# Patient Record
Sex: MALE | Race: WHITE | NOT HISPANIC OR LATINO
[De-identification: names, ages, dates, MRNs, and addresses within clinical notes are randomized per-mention and may not be internally consistent; named-entity substitution may affect disease eponyms.]

---

## 2017-08-10 ENCOUNTER — NON-APPOINTMENT (OUTPATIENT)
Age: 72
End: 2017-08-10

## 2017-08-10 ENCOUNTER — APPOINTMENT (OUTPATIENT)
Dept: CARDIOLOGY | Facility: CLINIC | Age: 72
End: 2017-08-10
Payer: MEDICARE

## 2017-08-10 VITALS
DIASTOLIC BLOOD PRESSURE: 84 MMHG | WEIGHT: 171 LBS | BODY MASS INDEX: 25.91 KG/M2 | OXYGEN SATURATION: 95 % | HEIGHT: 68 IN | HEART RATE: 62 BPM | SYSTOLIC BLOOD PRESSURE: 132 MMHG

## 2017-08-10 PROCEDURE — 93000 ELECTROCARDIOGRAM COMPLETE: CPT

## 2017-08-10 PROCEDURE — 99214 OFFICE O/P EST MOD 30 MIN: CPT

## 2017-09-15 ENCOUNTER — OUTPATIENT (OUTPATIENT)
Dept: OUTPATIENT SERVICES | Facility: HOSPITAL | Age: 72
LOS: 1 days | End: 2017-09-15
Payer: COMMERCIAL

## 2017-09-15 DIAGNOSIS — R06.02 SHORTNESS OF BREATH: ICD-10-CM

## 2017-09-15 DIAGNOSIS — Z98.89 OTHER SPECIFIED POSTPROCEDURAL STATES: Chronic | ICD-10-CM

## 2017-09-15 DIAGNOSIS — I10 ESSENTIAL (PRIMARY) HYPERTENSION: ICD-10-CM

## 2017-09-15 PROCEDURE — 93306 TTE W/DOPPLER COMPLETE: CPT | Mod: 26

## 2017-09-15 PROCEDURE — 78452 HT MUSCLE IMAGE SPECT MULT: CPT | Mod: 26

## 2017-09-15 PROCEDURE — 93017 CV STRESS TEST TRACING ONLY: CPT

## 2017-09-15 PROCEDURE — A9500: CPT

## 2017-09-15 PROCEDURE — 93018 CV STRESS TEST I&R ONLY: CPT

## 2017-09-15 PROCEDURE — 93306 TTE W/DOPPLER COMPLETE: CPT

## 2017-09-15 PROCEDURE — 93016 CV STRESS TEST SUPVJ ONLY: CPT

## 2017-09-15 PROCEDURE — 78452 HT MUSCLE IMAGE SPECT MULT: CPT

## 2017-10-11 ENCOUNTER — APPOINTMENT (OUTPATIENT)
Dept: PULMONOLOGY | Facility: CLINIC | Age: 72
End: 2017-10-11
Payer: MEDICARE

## 2017-10-11 VITALS
SYSTOLIC BLOOD PRESSURE: 120 MMHG | HEART RATE: 56 BPM | BODY MASS INDEX: 26.15 KG/M2 | DIASTOLIC BLOOD PRESSURE: 60 MMHG | OXYGEN SATURATION: 98 % | WEIGHT: 172 LBS

## 2017-10-11 VITALS — RESPIRATION RATE: 16 BRPM

## 2017-10-11 PROCEDURE — 99214 OFFICE O/P EST MOD 30 MIN: CPT

## 2017-10-16 ENCOUNTER — APPOINTMENT (OUTPATIENT)
Dept: CARDIOLOGY | Facility: CLINIC | Age: 72
End: 2017-10-16

## 2017-10-16 VITALS
HEIGHT: 68 IN | SYSTOLIC BLOOD PRESSURE: 137 MMHG | OXYGEN SATURATION: 97 % | DIASTOLIC BLOOD PRESSURE: 67 MMHG | HEART RATE: 50 BPM

## 2017-11-14 ENCOUNTER — APPOINTMENT (OUTPATIENT)
Dept: THORACIC SURGERY | Facility: CLINIC | Age: 72
End: 2017-11-14
Payer: MEDICARE

## 2017-11-14 VITALS
HEART RATE: 62 BPM | BODY MASS INDEX: 25.85 KG/M2 | OXYGEN SATURATION: 98 % | DIASTOLIC BLOOD PRESSURE: 78 MMHG | SYSTOLIC BLOOD PRESSURE: 149 MMHG | WEIGHT: 170 LBS | RESPIRATION RATE: 16 BRPM

## 2017-11-14 PROCEDURE — 99214 OFFICE O/P EST MOD 30 MIN: CPT

## 2018-02-05 ENCOUNTER — APPOINTMENT (OUTPATIENT)
Dept: CARDIOLOGY | Facility: CLINIC | Age: 73
End: 2018-02-05
Payer: MEDICARE

## 2018-02-05 VITALS
BODY MASS INDEX: 26.3 KG/M2 | HEART RATE: 55 BPM | SYSTOLIC BLOOD PRESSURE: 139 MMHG | DIASTOLIC BLOOD PRESSURE: 79 MMHG | WEIGHT: 173 LBS | OXYGEN SATURATION: 98 %

## 2018-02-05 PROCEDURE — 93000 ELECTROCARDIOGRAM COMPLETE: CPT

## 2018-02-05 PROCEDURE — 99214 OFFICE O/P EST MOD 30 MIN: CPT

## 2018-02-05 RX ORDER — RAMIPRIL 5 MG/1
5 CAPSULE ORAL DAILY
Qty: 30 | Refills: 0 | Status: DISCONTINUED | COMMUNITY
End: 2018-02-05

## 2018-03-19 ENCOUNTER — APPOINTMENT (OUTPATIENT)
Dept: UROLOGY | Facility: CLINIC | Age: 73
End: 2018-03-19
Payer: MEDICARE

## 2018-03-19 VITALS
WEIGHT: 173 LBS | BODY MASS INDEX: 26.22 KG/M2 | HEIGHT: 68 IN | TEMPERATURE: 97.4 F | DIASTOLIC BLOOD PRESSURE: 84 MMHG | SYSTOLIC BLOOD PRESSURE: 143 MMHG | HEART RATE: 61 BPM

## 2018-03-19 LAB
BILIRUB UR QL STRIP: NORMAL
CLARITY UR: CLEAR
COLLECTION METHOD: NORMAL
GLUCOSE UR-MCNC: NORMAL
HCG UR QL: 0.2 EU/DL
HGB UR QL STRIP.AUTO: NORMAL
KETONES UR-MCNC: NORMAL
LEUKOCYTE ESTERASE UR QL STRIP: NORMAL
NITRITE UR QL STRIP: NORMAL
PH UR STRIP: 5
PROT UR STRIP-MCNC: NORMAL
SP GR UR STRIP: 1.01

## 2018-03-19 PROCEDURE — 99214 OFFICE O/P EST MOD 30 MIN: CPT

## 2018-03-19 PROCEDURE — 81003 URINALYSIS AUTO W/O SCOPE: CPT | Mod: QW

## 2018-08-13 ENCOUNTER — APPOINTMENT (OUTPATIENT)
Dept: CARDIOLOGY | Facility: CLINIC | Age: 73
End: 2018-08-13
Payer: MEDICARE

## 2018-08-13 ENCOUNTER — NON-APPOINTMENT (OUTPATIENT)
Age: 73
End: 2018-08-13

## 2018-08-13 VITALS — DIASTOLIC BLOOD PRESSURE: 90 MMHG | SYSTOLIC BLOOD PRESSURE: 134 MMHG

## 2018-08-13 VITALS
HEIGHT: 68 IN | OXYGEN SATURATION: 98 % | DIASTOLIC BLOOD PRESSURE: 94 MMHG | WEIGHT: 164 LBS | SYSTOLIC BLOOD PRESSURE: 157 MMHG | HEART RATE: 55 BPM | BODY MASS INDEX: 24.86 KG/M2

## 2018-08-13 PROCEDURE — 99214 OFFICE O/P EST MOD 30 MIN: CPT

## 2018-08-13 PROCEDURE — 93000 ELECTROCARDIOGRAM COMPLETE: CPT

## 2018-09-10 ENCOUNTER — APPOINTMENT (OUTPATIENT)
Dept: UROLOGY | Facility: CLINIC | Age: 73
End: 2018-09-10
Payer: MEDICARE

## 2018-09-10 VITALS
SYSTOLIC BLOOD PRESSURE: 144 MMHG | DIASTOLIC BLOOD PRESSURE: 77 MMHG | BODY MASS INDEX: 24.86 KG/M2 | HEART RATE: 75 BPM | HEIGHT: 68 IN | RESPIRATION RATE: 14 BRPM | WEIGHT: 164 LBS

## 2018-09-10 PROCEDURE — 99213 OFFICE O/P EST LOW 20 MIN: CPT

## 2018-11-13 ENCOUNTER — APPOINTMENT (OUTPATIENT)
Dept: THORACIC SURGERY | Facility: CLINIC | Age: 73
End: 2018-11-13

## 2019-04-25 ENCOUNTER — APPOINTMENT (OUTPATIENT)
Dept: CARDIOLOGY | Facility: CLINIC | Age: 74
End: 2019-04-25

## 2019-05-02 ENCOUNTER — APPOINTMENT (OUTPATIENT)
Dept: UROLOGY | Facility: CLINIC | Age: 74
End: 2019-05-02

## 2019-05-14 ENCOUNTER — APPOINTMENT (OUTPATIENT)
Dept: CARDIOLOGY | Facility: CLINIC | Age: 74
End: 2019-05-14
Payer: MEDICARE

## 2019-05-14 ENCOUNTER — NON-APPOINTMENT (OUTPATIENT)
Age: 74
End: 2019-05-14

## 2019-05-14 VITALS
WEIGHT: 174 LBS | HEART RATE: 59 BPM | HEIGHT: 68 IN | SYSTOLIC BLOOD PRESSURE: 148 MMHG | BODY MASS INDEX: 26.37 KG/M2 | OXYGEN SATURATION: 95 % | DIASTOLIC BLOOD PRESSURE: 72 MMHG | RESPIRATION RATE: 16 BRPM

## 2019-05-14 VITALS — SYSTOLIC BLOOD PRESSURE: 130 MMHG | DIASTOLIC BLOOD PRESSURE: 64 MMHG

## 2019-05-14 DIAGNOSIS — K21.9 GASTRO-ESOPHAGEAL REFLUX DISEASE W/OUT ESOPHAGITIS: ICD-10-CM

## 2019-05-14 DIAGNOSIS — R06.02 SHORTNESS OF BREATH: ICD-10-CM

## 2019-05-14 PROCEDURE — 93000 ELECTROCARDIOGRAM COMPLETE: CPT

## 2019-05-14 PROCEDURE — 99214 OFFICE O/P EST MOD 30 MIN: CPT

## 2019-05-14 RX ORDER — METFORMIN ER 500 MG 500 MG/1
500 TABLET ORAL
Refills: 0 | Status: DISCONTINUED | COMMUNITY
Start: 2016-12-22 | End: 2019-05-14

## 2019-06-03 ENCOUNTER — APPOINTMENT (OUTPATIENT)
Dept: UROLOGY | Facility: CLINIC | Age: 74
End: 2019-06-03
Payer: MEDICARE

## 2019-06-03 VITALS
HEART RATE: 58 BPM | SYSTOLIC BLOOD PRESSURE: 191 MMHG | BODY MASS INDEX: 26.07 KG/M2 | WEIGHT: 172 LBS | HEIGHT: 68 IN | OXYGEN SATURATION: 96 % | DIASTOLIC BLOOD PRESSURE: 88 MMHG

## 2019-06-03 DIAGNOSIS — N50.3 CYST OF EPIDIDYMIS: ICD-10-CM

## 2019-06-03 DIAGNOSIS — N39.41 URGE INCONTINENCE: ICD-10-CM

## 2019-06-03 PROCEDURE — 51798 US URINE CAPACITY MEASURE: CPT

## 2019-06-03 PROCEDURE — 99214 OFFICE O/P EST MOD 30 MIN: CPT | Mod: 25

## 2019-06-03 NOTE — ASSESSMENT
[FreeTextEntry1] : Benign Prostatic Hyperplasia:\par Continue Flomax. \par \par Urge incontinence:\par Asked Pt to do timed voiding every 3-4 hours. \par \par Epididymal cyst:\par Discussed that an epididymal cyst is a fluid-filled sac which grows at the top end of the testicle. It is benign and not malignant. Observation is usually used for simple, small asymptomatic spermatoceles and surgery for complex, large symptomatic ones. \par Recommend:\par Scrotal support.\par NSAIDs PRN. \par \par Return to office in 1 year or sooner if any issues- will do Uroflo and PVR. \par

## 2019-06-03 NOTE — HISTORY OF PRESENT ILLNESS
[FreeTextEntry1] : 72 yo male presents for follow up. \par Taking Flomax- urinating well, daytime frequency 4-5 x, nocturia- 0-1 x.\par Has off and on urgency and urge incontinence. \par Denies dysuria, hematuria, lower abdominal or flank pain, fever, chills or rigors. \par Still has off and on pain in right testis. \par \par Initially seen for Renal cyst. \par \par \par \par

## 2019-06-03 NOTE — LETTER BODY
[Courtesy Letter:] : I had the pleasure of seeing your patient, [unfilled], in my office today. [Dear  ___] : Dear  [unfilled], [Please see my note below.] : Please see my note below. [Sincerely,] : Sincerely, [FreeTextEntry3] : Cleveland Gutierres MD\par  of Urology\par [facility]\par \par Offices:\par [facility] @\par [address], [place] [address]\par and\par [address], [place] [address], [address]\par and\par [address] [address]\par \par TEL: [phone]\par FAX: [phone]

## 2019-06-03 NOTE — PHYSICAL EXAM
[General Appearance - Well Developed] : well developed [General Appearance - In No Acute Distress] : no acute distress [Normal Appearance] : normal appearance [Abdomen Tenderness] : non-tender [Abdomen Mass (___ Cm)] : no abdominal mass palpated [Abdomen Soft] : soft [Penis Abnormality] : normal circumcised penis [Costovertebral Angle Tenderness] : no ~M costovertebral angle tenderness [Urethral Meatus] : meatus normal [Testes Tenderness] : no tenderness of the testes [Scrotum] : the scrotum was normal [Prostate Tenderness] : the prostate was not tender [Testes Mass (___cm)] : there were no testicular masses [No Prostate Nodules] : no prostate nodules [Prostate Size ___ gm] : prostate size [unfilled] gm [Skin Color & Pigmentation] : normal skin color and pigmentation [Oriented To Time, Place, And Person] : oriented to person, place, and time [FreeTextEntry1] : normal peripheral circulation [] : no respiratory distress [Normal Station and Gait] : the gait and station were normal for the patient's age [No Palpable Adenopathy] : no palpable adenopathy [No Focal Deficits] : no focal deficits

## 2019-08-12 PROBLEM — Z00.00 ENCOUNTER FOR PREVENTIVE HEALTH EXAMINATION: Noted: 2019-08-12

## 2019-11-04 ENCOUNTER — APPOINTMENT (OUTPATIENT)
Dept: CARDIOLOGY | Facility: CLINIC | Age: 74
End: 2019-11-04
Payer: MEDICARE

## 2019-11-04 ENCOUNTER — NON-APPOINTMENT (OUTPATIENT)
Age: 74
End: 2019-11-04

## 2019-11-04 VITALS
BODY MASS INDEX: 28.27 KG/M2 | HEART RATE: 56 BPM | RESPIRATION RATE: 16 BRPM | OXYGEN SATURATION: 98 % | DIASTOLIC BLOOD PRESSURE: 70 MMHG | SYSTOLIC BLOOD PRESSURE: 168 MMHG | WEIGHT: 178 LBS | HEIGHT: 66.5 IN

## 2019-11-04 VITALS — DIASTOLIC BLOOD PRESSURE: 70 MMHG | SYSTOLIC BLOOD PRESSURE: 136 MMHG

## 2019-11-04 DIAGNOSIS — Z87.891 PERSONAL HISTORY OF NICOTINE DEPENDENCE: ICD-10-CM

## 2019-11-04 PROCEDURE — 99214 OFFICE O/P EST MOD 30 MIN: CPT

## 2019-11-04 PROCEDURE — 93000 ELECTROCARDIOGRAM COMPLETE: CPT

## 2019-11-04 RX ORDER — GEMFIBROZIL 600 MG/1
600 TABLET, FILM COATED ORAL
Refills: 0 | Status: DISCONTINUED | COMMUNITY
Start: 2017-04-01 | End: 2019-11-04

## 2019-11-04 RX ORDER — RANITIDINE HYDROCHLORIDE 300 MG/1
300 CAPSULE ORAL
Refills: 0 | Status: DISCONTINUED | COMMUNITY
Start: 2017-05-13 | End: 2019-11-04

## 2019-11-04 NOTE — REVIEW OF SYSTEMS
[Headache] : no headache [Recent Weight Gain (___ Lbs)] : no recent weight gain [Feeling Fatigued] : not feeling fatigued [Recent Weight Loss (___ Lbs)] : no recent weight loss [Blurry Vision] : no blurred vision [Seeing Double (Diplopia)] : no diplopia [Eyeglasses] : currently wearing eyeglasses [Earache] : no earache [Mouth Sores] : no mouth sores [Sore Throat] : no sore throat [Shortness Of Breath] : no shortness of breath [Dyspnea on exertion] : dyspnea during exertion [Chest Pain] : no chest pain [Lower Ext Edema] : no extremity edema [Leg Claudication] : no intermittent leg claudication [Palpitations] : no palpitations [Cough] : no cough [Wheezing] : no wheezing [Abdominal Pain] : no abdominal pain [Heartburn] : no heartburn [Change in Appetite] : no change in appetite [Dysphagia] : no dysphagia [Hematuria] : no hematuria [Pain During Urination] : no dysuria [Nocturia] : no nocturia [Joint Pain] : joint pain [Joint Swelling] : no joint swelling [Joint Stiffness] : joint stiffness [Muscle Cramps] : no muscle cramps [Limb Weakness (Paresis)] : no limb weakness [Skin: A Rash] : no rash: [Skin Lesions] : no skin lesions [Dizziness] : no dizziness [Tremor] : no tremor was seen [Convulsions] : no convulsions [Confusion] : no confusion was observed [Memory Lapses Or Loss] : no memory lapses or loss [Anxiety] : no anxiety [Excessive Thirst] : no polydipsia [Easy Bleeding] : no tendency for easy bleeding [Swollen Glands] : no swollen glands [Easy Bruising] : no tendency for easy bruising

## 2019-11-04 NOTE — PHYSICAL EXAM
[Normal Appearance] : normal appearance [Well Groomed] : well groomed [Normal Conjunctiva] : the conjunctiva exhibited no abnormalities [Eyelids - No Xanthelasma] : the eyelids demonstrated no xanthelasmas [Normal Oral Mucosa] : normal oral mucosa [Normal Oropharynx] : normal oropharynx [Normal Jugular Venous A Waves Present] : normal jugular venous A waves present [Normal Jugular Venous V Waves Present] : normal jugular venous V waves present [Respiration, Rhythm And Depth] : normal respiratory rhythm and effort [Auscultation Breath Sounds / Voice Sounds] : lungs were clear to auscultation bilaterally [Heart Rate And Rhythm] : heart rate and rhythm were normal [Heart Sounds] : normal S1 and S2 [Arterial Pulses Normal] : the arterial pulses were normal [Edema] : no peripheral edema present [Bowel Sounds] : normal bowel sounds [Abdomen Soft] : soft [Abdomen Tenderness] : non-tender [Abnormal Walk] : normal gait [Gait - Sufficient For Exercise Testing] : the gait was sufficient for exercise testing [Nail Clubbing] : no clubbing of the fingernails [Cyanosis, Localized] : no localized cyanosis [Petechial Hemorrhages (___cm)] : no petechial hemorrhages [Skin Turgor] : normal skin turgor [] : no rash [No Venous Stasis] : no venous stasis [Oriented To Time, Place, And Person] : oriented to person, place, and time [Impaired Insight] : insight and judgment were intact [Affect] : the affect was normal

## 2019-11-04 NOTE — ASSESSMENT
[FreeTextEntry1] : Patient's blood pressure is controlled currently.  Continue as before.\par He was just started on Lipitor 10 mg.  He most likely will need higher doses.  Repeat fasting blood work in the next 2 to 3 months.\par Patient was advised to partake in 150 minutes of moderate exercise per week.\par Nuclear stress test from 2017 was negative for ischemia.\par Echocardiogram from 2017 normal LV function mild valvular heart disease, grade 1 diastolic dysfunction\par Due to history of his smoking, recommend ultrasound of the aorta for further evaluation.\par Mr. Figueredo can follow with me in 6 to 12 months or earlier if he has any new symptoms.\par

## 2019-11-04 NOTE — HISTORY OF PRESENT ILLNESS
[FreeTextEntry1] : 73-year-old gentleman with hypertension, hyperlipidemia, diabetes, carotid stenosis less than 50% who is here for cardiac evaluation.  Patient is a former smoker.  He is unable to lose any weight he denies any chest pain or excessive shortness of breath on exertion.  He is followed at [facility].  He denies any palpitations, syncope or presyncope.  Compliant with medications.  [facility] copies of the result is not available at this time.  He was placed on Lipitor 10 mg.\par \par EKG with normal sinus rhythm normal axis and intervals T wave inversion in V5 and V6 which was seen on prior EKGs.

## 2019-11-08 ENCOUNTER — APPOINTMENT (OUTPATIENT)
Dept: CARDIOLOGY | Facility: CLINIC | Age: 74
End: 2019-11-08
Payer: MEDICARE

## 2019-11-08 PROCEDURE — 93978 VASCULAR STUDY: CPT

## 2020-05-12 ENCOUNTER — APPOINTMENT (OUTPATIENT)
Dept: CARDIOLOGY | Facility: CLINIC | Age: 75
End: 2020-05-12

## 2020-12-11 ENCOUNTER — RESULT REVIEW (OUTPATIENT)
Age: 75
End: 2020-12-11

## 2020-12-11 ENCOUNTER — OUTPATIENT (OUTPATIENT)
Dept: OUTPATIENT SERVICES | Facility: HOSPITAL | Age: 75
LOS: 1 days | End: 2020-12-11

## 2020-12-11 ENCOUNTER — APPOINTMENT (OUTPATIENT)
Dept: CT IMAGING | Facility: CLINIC | Age: 75
End: 2020-12-11
Payer: MEDICARE

## 2020-12-11 DIAGNOSIS — C15.5 MALIGNANT NEOPLASM OF LOWER THIRD OF ESOPHAGUS: ICD-10-CM

## 2020-12-11 DIAGNOSIS — Z98.89 OTHER SPECIFIED POSTPROCEDURAL STATES: Chronic | ICD-10-CM

## 2020-12-11 PROCEDURE — 71250 CT THORAX DX C-: CPT | Mod: 26

## 2020-12-17 ENCOUNTER — APPOINTMENT (OUTPATIENT)
Dept: THORACIC SURGERY | Facility: CLINIC | Age: 75
End: 2020-12-17
Payer: MEDICARE

## 2020-12-21 ENCOUNTER — APPOINTMENT (OUTPATIENT)
Dept: CARDIOLOGY | Facility: CLINIC | Age: 75
End: 2020-12-21
Payer: MEDICARE

## 2020-12-21 ENCOUNTER — NON-APPOINTMENT (OUTPATIENT)
Age: 75
End: 2020-12-21

## 2020-12-21 VITALS
BODY MASS INDEX: 28.28 KG/M2 | HEIGHT: 66 IN | HEART RATE: 64 BPM | SYSTOLIC BLOOD PRESSURE: 149 MMHG | WEIGHT: 176 LBS | DIASTOLIC BLOOD PRESSURE: 79 MMHG | RESPIRATION RATE: 96 BRPM | TEMPERATURE: 98.2 F

## 2020-12-21 PROCEDURE — 99214 OFFICE O/P EST MOD 30 MIN: CPT

## 2020-12-21 PROCEDURE — 99072 ADDL SUPL MATRL&STAF TM PHE: CPT

## 2020-12-21 PROCEDURE — 93000 ELECTROCARDIOGRAM COMPLETE: CPT

## 2020-12-21 RX ORDER — METFORMIN HYDROCHLORIDE 500 MG/1
500 TABLET, FILM COATED, EXTENDED RELEASE ORAL DAILY
Refills: 0 | Status: ACTIVE | COMMUNITY

## 2020-12-21 RX ORDER — ATORVASTATIN CALCIUM 10 MG/1
10 TABLET, FILM COATED ORAL DAILY
Refills: 0 | Status: DISCONTINUED | COMMUNITY
End: 2020-12-21

## 2020-12-21 RX ORDER — ALBUTEROL SULFATE 90 UG/1
108 AEROSOL, METERED RESPIRATORY (INHALATION)
Refills: 0 | Status: ACTIVE | COMMUNITY

## 2020-12-26 NOTE — REVIEW OF SYSTEMS
[Headache] : no headache [Recent Weight Gain (___ Lbs)] : no recent weight gain [Feeling Fatigued] : not feeling fatigued [Recent Weight Loss (___ Lbs)] : no recent weight loss [Blurry Vision] : no blurred vision [Seeing Double (Diplopia)] : no diplopia [Eyeglasses] : currently wearing eyeglasses [Earache] : no earache [Mouth Sores] : no mouth sores [Sore Throat] : no sore throat [Shortness Of Breath] : no shortness of breath [Dyspnea on exertion] : dyspnea during exertion [Chest  Pressure] : no chest pressure [Chest Pain] : no chest pain [Lower Ext Edema] : no extremity edema [Leg Claudication] : no intermittent leg claudication [Palpitations] : no palpitations [Cough] : no cough [Wheezing] : no wheezing [Abdominal Pain] : no abdominal pain [Heartburn] : no heartburn [Change in Appetite] : no change in appetite [Dysphagia] : no dysphagia [Hematuria] : no hematuria [Pain During Urination] : no dysuria [Nocturia] : no nocturia [Joint Pain] : joint pain [Joint Swelling] : no joint swelling [Joint Stiffness] : joint stiffness [Muscle Cramps] : no muscle cramps [Limb Weakness (Paresis)] : no limb weakness [Skin: A Rash] : no rash: [Skin Lesions] : no skin lesions [Dizziness] : no dizziness [Tremor] : no tremor was seen [Convulsions] : no convulsions [Confusion] : no confusion was observed [Memory Lapses Or Loss] : no memory lapses or loss [Anxiety] : no anxiety [Excessive Thirst] : no polydipsia [Easy Bleeding] : no tendency for easy bleeding [Swollen Glands] : no swollen glands [Easy Bruising] : no tendency for easy bruising

## 2020-12-26 NOTE — ASSESSMENT
[FreeTextEntry1] : BP elevated, increase altace\par [facility], he will gt a copy to me for review\par Check carotid with prior hx of carotid stenosis\par Low salt diet\par Stress test, T wave inversion in AL leads.\par patient was advised to see us in 6 months if stable and certainly earlier with any new symptoms.\par

## 2020-12-26 NOTE — HISTORY OF PRESENT ILLNESS
[FreeTextEntry1] : 75-year-old male who is here due to hypertension, hyperlipidemia, carotid disease and dyspnea on exertion.\par He has a history of COPD but does not smoke any longer.  He is followed at [facility].  He has no chest pain with activity but does not exercise.  He is short of breath with moderate level of exertion such as climbing stairs but this has not changed.\par He is on low-dose aspirin.  His last stress test was in 2017.\par Patient has a history of esophageal cancer.  He is in remission and is followed by his surgeon.  The last CAT scan of the chest was performed on December 11, 2020 demonstrating 2 right lower lobe nodules as well as a 2 cm right upper lobe renal cyst.  Patient was notified of the findings and need for ultrasound of kidneys and will follow with his primary care physician for that.\par His most recent blood work is from 2019.  He had blood work at the [facility] and will bring a copy of the results for my review in the next year.\par

## 2021-01-04 ENCOUNTER — APPOINTMENT (OUTPATIENT)
Dept: THORACIC SURGERY | Facility: CLINIC | Age: 76
End: 2021-01-04
Payer: MEDICARE

## 2021-01-04 VITALS
HEIGHT: 66 IN | BODY MASS INDEX: 27.32 KG/M2 | WEIGHT: 170 LBS | DIASTOLIC BLOOD PRESSURE: 82 MMHG | OXYGEN SATURATION: 96 % | SYSTOLIC BLOOD PRESSURE: 152 MMHG | HEART RATE: 61 BPM

## 2021-01-04 PROCEDURE — 99205 OFFICE O/P NEW HI 60 MIN: CPT

## 2021-01-04 PROCEDURE — 99072 ADDL SUPL MATRL&STAF TM PHE: CPT

## 2021-01-04 RX ORDER — ALBUTEROL SULFATE 108 UG/1
108 (90 BASE) AEROSOL, METERED RESPIRATORY (INHALATION) EVERY 4 HOURS
Refills: 0 | Status: DISCONTINUED | COMMUNITY
End: 2021-01-04

## 2021-01-05 NOTE — CONSULT LETTER
[Consult Closing:] : Thank you very much for allowing me to participate in the care of this patient.  If you have any questions, please do not hesitate to contact me. [FreeTextEntry2] : Dr. Jazmin Garrett MD (PCP)\par Dr. Raya (GI)\par Dr. Mason (Pulm) \par Dr. Baker (Cardiologist) [FreeTextEntry3] : Juventino Gillis MD, MPH \par System Director of Thoracic Surgery \par Director of Comprehensive Lung and Foregut Rye \par Professor Cardiovascular & Thoracic Surgery  \par [facility]\par \par [facility]\par [address]\par Oncology [address]\[place] [address]\par Tel: [phone]\par Fax: [phone]\par

## 2021-01-05 NOTE — DATA REVIEWED
[FreeTextEntry1] : CT chest on 12/11/2020:\par - upper abdominal and esophageal post-op changes\par - two 3 mm RLL nodule, not definitely visualized on the prior study\par - 2 cm Right upper pole renal cyst, previously measured 1.3 cm.

## 2021-01-05 NOTE — ASSESSMENT
[FreeTextEntry1] : Mr. ALEJA JUÁREZ, 75 year old male, former smoker, w/ hx of HTN, DM2, LEILA on CPAP, COPD, skin CA, Esophageal CA s/p induction chemo then surgery in 2008, no chemo after surgery, who presented today to re-establish care. \par \par Patient was last seen on 11/14/2017 and I recommended patient to RTC in 1 year, no f/u since then. \par \par Now 12 years s/p EGD, Laparotomy, ALND, feeding jejunostomy tube placement, Rt Thoracotomy en bloc esophagectomy, MLND on 12/2/2008. Path revealed invasive AdenoCA of esophagus, margins and LNs negative, ypT1N0 Stg I.\par \par CT chest on 12/11/2020:\par - upper abdominal and esophageal post-op changes\par - two 3 mm RLL nodule, not definitely visualized on the prior study\par - 2 cm Right upper pole renal cyst, previously measured 1.3 cm. \par \par Of note, EGD Feb 2020 by Dr. Raya was "normal" per patient.\par \par I have reviewed the patient's medical records and diagnostic images at time of this office consultation and have made the following recommendation:\par 1. CT scan showed no evidence of recurrence, recommended patient to return to office in 1yr with CT Chest w/o contrast.\par \par \par I personally performed the services described in the documentation, reviewed the documentation recorded by the scribe in my presence and it accurately and completely records my words and actions.\par \par I, Clive Sorensen NP, am scribing for and the presence of FLORES Neil, the following sections HISTORY OF PRESENT ILLNESS, PAST MEDICAL/FAMILY/SOCIAL HISTORY; REVIEW OF SYSTEMS; VITAL SIGNS; PHYSICAL EXAM; DISPOSITION.\par \par

## 2021-01-05 NOTE — HISTORY OF PRESENT ILLNESS
[FreeTextEntry1] : Mr. ALEJA JUÁREZ, 75 year old male, former smoker, w/ hx of HTN, DM2, LEILA on CPAP, COPD, skin CA, Esophageal CA s/p induction chemo then surgery in 2008, no chemo after surgery, who presented today to re-establish care. \par \par Patient was last seen on 11/14/2017 and I recommended patient to RTC in 1 year, no f/u since then. \par \par Now 12 years s/p EGD, Laparotomy, ALND, feeding jejunostomy tube placement, Rt Thoracotomy en bloc esophagectomy, MLND on 12/2/2008. Path revealed invasive AdenoCA of esophagus, margins and LNs negative, ypT1N0 Stg I.\par \par CT chest on 12/11/2020:\par - upper abdominal and esophageal post-op changes\par - two 3 mm RLL nodule, not definitely visualized on the prior study\par - 2 cm Right upper pole renal cyst, previously measured 1.3 cm. \par \par Of note, EGD Feb 2020 by Dr. Raya was "normal" per patient.\par Patient is here today to re-establish care.

## 2021-01-12 ENCOUNTER — APPOINTMENT (OUTPATIENT)
Dept: CARDIOLOGY | Facility: CLINIC | Age: 76
End: 2021-01-12
Payer: MEDICARE

## 2021-01-12 PROCEDURE — 99072 ADDL SUPL MATRL&STAF TM PHE: CPT

## 2021-01-12 PROCEDURE — 93880 EXTRACRANIAL BILAT STUDY: CPT

## 2021-01-12 PROCEDURE — 78452 HT MUSCLE IMAGE SPECT MULT: CPT

## 2021-01-12 PROCEDURE — A9500: CPT

## 2021-01-12 PROCEDURE — 93015 CV STRESS TEST SUPVJ I&R: CPT

## 2021-01-29 DIAGNOSIS — R94.39 ABNORMAL RESULT OF OTHER CARDIOVASCULAR FUNCTION STUDY: ICD-10-CM

## 2021-02-12 NOTE — H&P PST ADULT - NSICDXPASTSURGICALHX_GEN_ALL_CORE_FT
PAST SURGICAL HISTORY:  History of esophagectomy     S/P colectomy Right Hemicolectomy secondary to polyps.    S/P colonoscopy     S/P hernia surgery Umblical Hernia Repair     PAST SURGICAL HISTORY:  History of esophagectomy     S/P coil embolization of cerebral aneurysm     S/P colectomy Right Hemicolectomy secondary to polyps.    S/P colonoscopy     S/P hernia surgery Umblical Hernia Repair

## 2021-02-12 NOTE — H&P PST ADULT - NSICDXPASTMEDICALHX_GEN_ALL_CORE_FT
PAST MEDICAL HISTORY:  BPH (benign prostatic hyperplasia)     Diabetes mellitus     Emphysema     Esophageal cancer     GERD (gastroesophageal reflux disease)     H/O carotid artery stenosis     High cholesterol     Hypertension     LEILA on CPAP     Pneumonia     Sleep apnea      PAST MEDICAL HISTORY:  BPH (benign prostatic hyperplasia)     Diabetes mellitus     Emphysema     Esophageal cancer     GERD (gastroesophageal reflux disease)     H/O carotid artery stenosis     High cholesterol     History of cerebral aneurysm     Hypertension     LEILA on CPAP     Pneumonia     Sleep apnea

## 2021-02-12 NOTE — H&P PST ADULT - ASSESSMENT
This is a 75 year old male with a PMH of HTN, HLD, carotid disease, former smoker, COPD, DM, malignant neoplasm of esophagus, and dyspnea,( moderate level of exertion)  presenting to the Cath holding area this am for a LHC with Dr ZOHREH Hayes. secondary to increasing SOB.      PRE-PROCEDURE ASSESSMENT  -NPO after midnight confirmed  -IV insert  -Patient seen and examined  -Labs reviewed  -Pre-procedure teaching completed with patient   - Consent obtained   -Questions answered    ASA-  MALL-  Creat-  GFR-  BRA-    This is a 75 year old male with a PMH of HTN, HLD, carotid disease, former smoker, COPD, DM, malignant neoplasm of esophagus, and dyspnea,( moderate level of exertion)  presenting to the Cath holding area this am for a LHC with Dr ZOHREH Hayes. secondary to increasing SOB.      PRE-PROCEDURE ASSESSMENT  -NPO after midnight confirmed  -IV insert  -Patient seen and examined  -Labs reviewed  -Pre-procedure teaching completed with patient   - Consent obtained   -Questions answered

## 2021-02-13 ENCOUNTER — APPOINTMENT (OUTPATIENT)
Dept: DISASTER EMERGENCY | Facility: CLINIC | Age: 76
End: 2021-02-13

## 2021-02-14 LAB — SARS-COV-2 N GENE NPH QL NAA+PROBE: NOT DETECTED

## 2021-02-16 ENCOUNTER — TRANSCRIPTION ENCOUNTER (OUTPATIENT)
Age: 76
End: 2021-02-16

## 2021-02-16 ENCOUNTER — INPATIENT (INPATIENT)
Facility: HOSPITAL | Age: 76
LOS: 0 days | Discharge: HOME | DRG: 247 | End: 2021-02-17
Attending: INTERNAL MEDICINE | Admitting: INTERNAL MEDICINE
Payer: COMMERCIAL

## 2021-02-16 VITALS
SYSTOLIC BLOOD PRESSURE: 171 MMHG | DIASTOLIC BLOOD PRESSURE: 77 MMHG | RESPIRATION RATE: 16 BRPM | TEMPERATURE: 98 F | HEART RATE: 56 BPM | OXYGEN SATURATION: 96 %

## 2021-02-16 DIAGNOSIS — Z98.89 OTHER SPECIFIED POSTPROCEDURAL STATES: Chronic | ICD-10-CM

## 2021-02-16 DIAGNOSIS — Z98.890 OTHER SPECIFIED POSTPROCEDURAL STATES: Chronic | ICD-10-CM

## 2021-02-16 DIAGNOSIS — R06.02 SHORTNESS OF BREATH: ICD-10-CM

## 2021-02-16 LAB
ANION GAP SERPL CALC-SCNC: 14 MMOL/L — SIGNIFICANT CHANGE UP (ref 5–17)
APTT BLD: 29 SEC — SIGNIFICANT CHANGE UP (ref 27.5–35.5)
BUN SERPL-MCNC: 14 MG/DL — SIGNIFICANT CHANGE UP (ref 8–20)
CALCIUM SERPL-MCNC: 8.9 MG/DL — SIGNIFICANT CHANGE UP (ref 8.6–10.2)
CHLORIDE SERPL-SCNC: 104 MMOL/L — SIGNIFICANT CHANGE UP (ref 98–107)
CO2 SERPL-SCNC: 22 MMOL/L — SIGNIFICANT CHANGE UP (ref 22–29)
CREAT SERPL-MCNC: 0.99 MG/DL — SIGNIFICANT CHANGE UP (ref 0.5–1.3)
GLUCOSE BLDC GLUCOMTR-MCNC: 113 MG/DL — HIGH (ref 70–99)
GLUCOSE BLDC GLUCOMTR-MCNC: 115 MG/DL — HIGH (ref 70–99)
GLUCOSE SERPL-MCNC: 143 MG/DL — HIGH (ref 70–99)
HCT VFR BLD CALC: 43.9 % — SIGNIFICANT CHANGE UP (ref 39–50)
HGB BLD-MCNC: 14.7 G/DL — SIGNIFICANT CHANGE UP (ref 13–17)
INR BLD: 1 RATIO — SIGNIFICANT CHANGE UP (ref 0.88–1.16)
MAGNESIUM SERPL-MCNC: 2.2 MG/DL — SIGNIFICANT CHANGE UP (ref 1.6–2.6)
MCHC RBC-ENTMCNC: 29.9 PG — SIGNIFICANT CHANGE UP (ref 27–34)
MCHC RBC-ENTMCNC: 33.5 GM/DL — SIGNIFICANT CHANGE UP (ref 32–36)
MCV RBC AUTO: 89.4 FL — SIGNIFICANT CHANGE UP (ref 80–100)
PLATELET # BLD AUTO: 219 K/UL — SIGNIFICANT CHANGE UP (ref 150–400)
POTASSIUM SERPL-MCNC: 4.6 MMOL/L — SIGNIFICANT CHANGE UP (ref 3.5–5.3)
POTASSIUM SERPL-SCNC: 4.6 MMOL/L — SIGNIFICANT CHANGE UP (ref 3.5–5.3)
PROTHROM AB SERPL-ACNC: 11.6 SEC — SIGNIFICANT CHANGE UP (ref 10.6–13.6)
RBC # BLD: 4.91 M/UL — SIGNIFICANT CHANGE UP (ref 4.2–5.8)
RBC # FLD: 14.1 % — SIGNIFICANT CHANGE UP (ref 10.3–14.5)
SODIUM SERPL-SCNC: 140 MMOL/L — SIGNIFICANT CHANGE UP (ref 135–145)
WBC # BLD: 6.18 K/UL — SIGNIFICANT CHANGE UP (ref 3.8–10.5)
WBC # FLD AUTO: 6.18 K/UL — SIGNIFICANT CHANGE UP (ref 3.8–10.5)

## 2021-02-16 PROCEDURE — 99152 MOD SED SAME PHYS/QHP 5/>YRS: CPT

## 2021-02-16 PROCEDURE — 92928 PRQ TCAT PLMT NTRAC ST 1 LES: CPT | Mod: RC

## 2021-02-16 PROCEDURE — 93458 L HRT ARTERY/VENTRICLE ANGIO: CPT | Mod: 26,XU

## 2021-02-16 RX ORDER — DEXTROSE 50 % IN WATER 50 %
12.5 SYRINGE (ML) INTRAVENOUS ONCE
Refills: 0 | Status: DISCONTINUED | OUTPATIENT
Start: 2021-02-16 | End: 2021-02-17

## 2021-02-16 RX ORDER — CLOPIDOGREL BISULFATE 75 MG/1
75 TABLET, FILM COATED ORAL DAILY
Refills: 0 | Status: DISCONTINUED | OUTPATIENT
Start: 2021-02-17 | End: 2021-02-17

## 2021-02-16 RX ORDER — METOPROLOL TARTRATE 50 MG
25 TABLET ORAL DAILY
Refills: 0 | Status: DISCONTINUED | OUTPATIENT
Start: 2021-02-16 | End: 2021-02-17

## 2021-02-16 RX ORDER — ONDANSETRON 8 MG/1
4 TABLET, FILM COATED ORAL ONCE
Refills: 0 | Status: COMPLETED | OUTPATIENT
Start: 2021-02-16 | End: 2021-02-16

## 2021-02-16 RX ORDER — ASPIRIN/CALCIUM CARB/MAGNESIUM 324 MG
81 TABLET ORAL DAILY
Refills: 0 | Status: DISCONTINUED | OUTPATIENT
Start: 2021-02-16 | End: 2021-02-17

## 2021-02-16 RX ORDER — ASPIRIN/CALCIUM CARB/MAGNESIUM 324 MG
81 TABLET ORAL ONCE
Refills: 0 | Status: COMPLETED | OUTPATIENT
Start: 2021-02-16 | End: 2021-02-16

## 2021-02-16 RX ORDER — ATORVASTATIN CALCIUM 80 MG/1
80 TABLET, FILM COATED ORAL AT BEDTIME
Refills: 0 | Status: DISCONTINUED | OUTPATIENT
Start: 2021-02-16 | End: 2021-02-17

## 2021-02-16 RX ORDER — DOXAZOSIN MESYLATE 4 MG
1 TABLET ORAL AT BEDTIME
Refills: 0 | Status: DISCONTINUED | OUTPATIENT
Start: 2021-02-16 | End: 2021-02-17

## 2021-02-16 RX ORDER — SODIUM CHLORIDE 9 MG/ML
1000 INJECTION, SOLUTION INTRAVENOUS
Refills: 0 | Status: DISCONTINUED | OUTPATIENT
Start: 2021-02-16 | End: 2021-02-17

## 2021-02-16 RX ORDER — DEXTROSE 50 % IN WATER 50 %
25 SYRINGE (ML) INTRAVENOUS ONCE
Refills: 0 | Status: DISCONTINUED | OUTPATIENT
Start: 2021-02-16 | End: 2021-02-17

## 2021-02-16 RX ORDER — GLUCAGON INJECTION, SOLUTION 0.5 MG/.1ML
1 INJECTION, SOLUTION SUBCUTANEOUS ONCE
Refills: 0 | Status: DISCONTINUED | OUTPATIENT
Start: 2021-02-16 | End: 2021-02-17

## 2021-02-16 RX ORDER — DEXTROSE 50 % IN WATER 50 %
15 SYRINGE (ML) INTRAVENOUS ONCE
Refills: 0 | Status: DISCONTINUED | OUTPATIENT
Start: 2021-02-16 | End: 2021-02-17

## 2021-02-16 RX ORDER — LISINOPRIL 2.5 MG/1
40 TABLET ORAL DAILY
Refills: 0 | Status: DISCONTINUED | OUTPATIENT
Start: 2021-02-16 | End: 2021-02-17

## 2021-02-16 RX ORDER — SODIUM CHLORIDE 9 MG/ML
500 INJECTION INTRAMUSCULAR; INTRAVENOUS; SUBCUTANEOUS ONCE
Refills: 0 | Status: COMPLETED | OUTPATIENT
Start: 2021-02-16 | End: 2021-02-16

## 2021-02-16 RX ORDER — ALBUTEROL 90 UG/1
2 AEROSOL, METERED ORAL EVERY 6 HOURS
Refills: 0 | Status: DISCONTINUED | OUTPATIENT
Start: 2021-02-16 | End: 2021-02-17

## 2021-02-16 RX ORDER — FAMOTIDINE 10 MG/ML
20 INJECTION INTRAVENOUS DAILY
Refills: 0 | Status: DISCONTINUED | OUTPATIENT
Start: 2021-02-16 | End: 2021-02-17

## 2021-02-16 RX ORDER — HYDRALAZINE HCL 50 MG
10 TABLET ORAL ONCE
Refills: 0 | Status: COMPLETED | OUTPATIENT
Start: 2021-02-16 | End: 2021-02-16

## 2021-02-16 RX ADMIN — Medication 81 MILLIGRAM(S): at 08:49

## 2021-02-16 RX ADMIN — Medication 10 MILLIGRAM(S): at 17:52

## 2021-02-16 RX ADMIN — Medication 10 MILLIGRAM(S): at 18:41

## 2021-02-16 RX ADMIN — FAMOTIDINE 20 MILLIGRAM(S): 10 INJECTION INTRAVENOUS at 12:00

## 2021-02-16 RX ADMIN — ONDANSETRON 4 MILLIGRAM(S): 8 TABLET, FILM COATED ORAL at 19:54

## 2021-02-16 RX ADMIN — LISINOPRIL 40 MILLIGRAM(S): 2.5 TABLET ORAL at 15:52

## 2021-02-16 RX ADMIN — Medication 1 MILLIGRAM(S): at 19:55

## 2021-02-16 RX ADMIN — Medication 81 MILLIGRAM(S): at 10:44

## 2021-02-16 RX ADMIN — SODIUM CHLORIDE 1000 MILLILITER(S): 9 INJECTION INTRAMUSCULAR; INTRAVENOUS; SUBCUTANEOUS at 19:44

## 2021-02-16 NOTE — DISCHARGE NOTE PROVIDER - NSDCACTIVITY_GEN_ALL_CORE
Stairs allowed/Walking - Indoors allowed/No heavy lifting/straining/Walking - Outdoors allowed Do not drive or operate machinery/Stairs allowed/Walking - Indoors allowed/No heavy lifting/straining/Walking - Outdoors allowed

## 2021-02-16 NOTE — PROGRESS NOTE ADULT - SUBJECTIVE AND OBJECTIVE BOX
Cardiology Nurse Practitioner Note    Pt c/o "wave like vision in right eye."  Neuro exam negative.  VSS.  NS 500cc bolus given with resolution of symptoms.  Will cont to monitor.

## 2021-02-16 NOTE — DISCHARGE NOTE PROVIDER - NSDCCPTREATMENT_GEN_ALL_CORE_FT
PRINCIPAL PROCEDURE  Procedure: Left heart cardiac cath  Findings and Treatment: PCI to RCA       PRINCIPAL PROCEDURE  Procedure: Left heart cardiac cath  Findings and Treatment: Percutaneous Interventio to right coronary artery:   Drug Eluding Stents: to RCA (80% stenosis pRCA 1 stent, 90% mRCA 1 stent, circ 50-60% 1 stent.    (SYNERGY 3.5 X 38MM, SYNERGY 3.5 X 16MM, SYNERGY 3.5 X 8MM)

## 2021-02-16 NOTE — DISCHARGE NOTE PROVIDER - NSDCCPCAREPLAN_GEN_ALL_CORE_FT
PRINCIPAL DISCHARGE DIAGNOSIS  Diagnosis: CAD in native artery  Assessment and Plan of Treatment: PCI       PRINCIPAL DISCHARGE DIAGNOSIS  Diagnosis: CAD in native artery  Assessment and Plan of Treatment: Patient found with lesions to Right Coronary Artery for which he required drug eluding stents.

## 2021-02-16 NOTE — PROGRESS NOTE ADULT - SUBJECTIVE AND OBJECTIVE BOX
Cardiology Nurse Practitioner Note    Pt c/o nausea.  Neuro status unchanged and normal.  No deficits.  Eye vision normal.    Zofran given with resolution of nausea.    B/P elevated.  Will cont to monitor.

## 2021-02-16 NOTE — ASU PATIENT PROFILE, ADULT - PMH
BPH (benign prostatic hyperplasia)    Diabetes mellitus    Emphysema    Esophageal cancer    GERD (gastroesophageal reflux disease)    H/O carotid artery stenosis    High cholesterol    Hypertension    LEILA on CPAP    Pneumonia    Sleep apnea

## 2021-02-16 NOTE — DISCHARGE NOTE PROVIDER - HOSPITAL COURSE
75 year old male with a PMH of HTN, HLD, carotid disease, former smoker, COPD, DM, malignant neoplasm of esophagus, and dyspnea,( moderate level of exertion)  presenting to the Cath holding area this am for a LHC secondary to increasing SOB. Patient now status post left heart catheterization which required 3 drug eluting stents to Right Coronary Artery (80% stenosis of proximal RCA, 90% mid RCA, circumflex 50-60% stenosis). Given intervention to right coronary artery you were started on Plavix 75 mg daily and will continue with Aspirin 81 mg daily. On discharge patient to follow with Dr. Merritt, office will call to confirm appointment.

## 2021-02-16 NOTE — DISCHARGE NOTE PROVIDER - NSDCFUADDINST_GEN_ALL_CORE_FT
Restricted use with no heavy lifting of affected arm for 48 hours.  No submerging the arm in water for 48 hours.  You may start showering today.  Call your doctor for any bleeding, swelling, loss of sensation in the hand or fingers, or fingers turning blue.  If heavy bleeding or large lumps form, hold pressure at the spot and come to the Emergency Room.   Restricted use with no heavy lifting of affected arm for 48 hours.  No submerging the arm in water for 48 hours.  You may start showering today.  Call your doctor for any bleeding, swelling, loss of sensation in the hand or fingers, or fingers turning blue.  If heavy bleeding or large lumps form, hold pressure at the spot and come to the Emergency Room.    cardiac rehab info provided/referral and communication to cardiac rehab completed

## 2021-02-16 NOTE — PROGRESS NOTE ADULT - SUBJECTIVE AND OBJECTIVE BOX
Nurse Practitioner Progress note:     INTERVAL HISTORY: 75 year old male with a PMH of HTN, HLD, carotid disease, former smoker, COPD, DM, malignant neoplasm of esophagus, and dyspnea,( moderate level of exertion)  presenting to the [facility] area this am for a LHC secondary to increasing SOB.    MEDICATIONS:  doxazosin 1 milliGRAM(s) Oral at bedtime  lisinopril 40 milliGRAM(s) Oral daily  metoprolol succinate ER 25 milliGRAM(s) Oral daily  ALBUTerol    90 MICROgram(s) HFA Inhaler 2 Puff(s) Inhalation every 6 hours PRN  famotidine    Tablet 20 milliGRAM(s) Oral daily  atorvastatin 80 milliGRAM(s) Oral at bedtime  dextrose 40% Gel 15 Gram(s) Oral once  dextrose 50% Injectable 25 Gram(s) IV Push once  dextrose 50% Injectable 12.5 Gram(s) IV Push once  dextrose 50% Injectable 25 Gram(s) IV Push once  glucagon  Injectable 1 milliGRAM(s) IntraMuscular once  aspirin  chewable 81 milliGRAM(s) Oral daily  dextrose 5%. 1000 milliLiter(s) IV Continuous <Continuous>  dextrose 5%. 1000 milliLiter(s) IV Continuous <Continuous>  plavix 75mg      TELEMETRY: NSR 62 bpm    T(C): 36.6 (02-16-21 @ 08:01), Max: 36.6 (02-16-21 @ 08:01)  HR: 61 (02-16-21 @ 11:41) (53 - 61)  BP: 165/75 (02-16-21 @ 11:41) (162/71 - 172/77)  RR: 18 (02-16-21 @ 11:41) (14 - 18)  SpO2: 96% (02-16-21 @ 11:41) (93% - 96%)  Wt(kg): --    PHYSICAL EXAM:  Appearance: Normal	  HEENT:   Normal oral mucosa, PERRL  Cardiovascular: Normal S1 S2, No JVD, No murmurs, No edema  Respiratory: Lungs clear to auscultation	  Psychiatry: A & O x 3, Mood & affect appropriate  Gastrointestinal:  Soft, Non-tender, + BS	  Skin: No rashes, No ecchymoses, No cyanosis  Neurologic: Non-focal, A&O X3.  No neuro deficits  Extremities: Normal range of motion, No clubbing, cyanosis or edema  Vascular: Peripheral pulses palpable 2+ bilaterally  Procedure Site: Right radial band in place. Site benign.  No bleeding/hematoma/ecchymosis.  + palp radial pulse.     12 lead EKG:  	SB 58 bpm.  No acute changes    MEDICATION DURING PROCEDURE:   fentanyl 100 mcg  versed 3 mg  heparin 8000u  hydralazine 10 mg  plavix 600 mg  omnipaque 123 ml    PROCEDURE RESULTS: S/P PCI with ELLIOTT X 3 to RCA (80% stenosis pRCA, 90% mRCA, circ 50-60% stenosis via right radial.   (SYNERGY 3.5 X 38MM, SYNERGY 3.5 X 16MM, SYNERGY 3.5 X 8MM)    ASSESSMENT/PLAN: 	  -Admit to tele d/t HTN and long lesion  -Radial precautions  -D/C radial band 12:30  -Resume home meds  -Initiate plavix  -Follow up with Dr. Baker  -Check labs/EKG/site check in AM  -Probable discharge in AM     Nurse Practitioner Progress note:     INTERVAL HISTORY: 75 year old male with a PMH of HTN, HLD, carotid disease, former smoker, COPD, DM, malignant neoplasm of esophagus, and dyspnea,( moderate level of exertion)  presenting to the [facility] area this am for a LHC secondary to increasing SOB.    MEDICATIONS:  doxazosin 1 milliGRAM(s) Oral at bedtime  lisinopril 40 milliGRAM(s) Oral daily  metoprolol succinate ER 25 milliGRAM(s) Oral daily  ALBUTerol    90 MICROgram(s) HFA Inhaler 2 Puff(s) Inhalation every 6 hours PRN  famotidine    Tablet 20 milliGRAM(s) Oral daily  atorvastatin 80 milliGRAM(s) Oral at bedtime  dextrose 40% Gel 15 Gram(s) Oral once  dextrose 50% Injectable 25 Gram(s) IV Push once  dextrose 50% Injectable 12.5 Gram(s) IV Push once  dextrose 50% Injectable 25 Gram(s) IV Push once  glucagon  Injectable 1 milliGRAM(s) IntraMuscular once  aspirin  chewable 81 milliGRAM(s) Oral daily  dextrose 5%. 1000 milliLiter(s) IV Continuous <Continuous>  dextrose 5%. 1000 milliLiter(s) IV Continuous <Continuous>  plavix 75mg      TELEMETRY: NSR 62 bpm    T(C): 36.6 (02-16-21 @ 08:01), Max: 36.6 (02-16-21 @ 08:01)  HR: 61 (02-16-21 @ 11:41) (53 - 61)  BP: 165/75 (02-16-21 @ 11:41) (162/71 - 172/77)  RR: 18 (02-16-21 @ 11:41) (14 - 18)  SpO2: 96% (02-16-21 @ 11:41) (93% - 96%)  Wt(kg): --    PHYSICAL EXAM:  Appearance: Normal	  HEENT:   Normal oral mucosa, PERRL  Cardiovascular: Normal S1 S2, No JVD, No murmurs, No edema  Respiratory: Lungs clear to auscultation	  Psychiatry: A & O x 3, Mood & affect appropriate  Gastrointestinal:  Soft, Non-tender, + BS	  Skin: No rashes, No ecchymoses, No cyanosis  Neurologic: Non-focal, A&O X3.  No neuro deficits  Extremities: Normal range of motion, No clubbing, cyanosis or edema  Vascular: Peripheral pulses palpable 2+ bilaterally  Procedure Site: Right radial band in place. Site benign.  No bleeding/hematoma/ecchymosis.  + palp radial pulse.     12 lead EKG:  	SB 58 bpm.  No acute changes    MEDICATION DURING PROCEDURE:   fentanyl 100 mcg  versed 3 mg  heparin 8000u  hydralazine 10 mg  plavix 600 mg  omnipaque 123 ml    PROCEDURE RESULTS: S/P PCI with ELLIOTT X 3 to RCA (80% stenosis pRCA, 90% mRCA, circ 50-60% stenosis via right radial.   (SYNERGY 3.5 X 38MM, SYNERGY 3.5 X 16MM, SYNERGY 3.5 X 8MM)    ASSESSMENT/PLAN: 	  -Admit to tele d/t HTN and long lesion  -Radial precautions  -D/C radial band 12:30  -Resume home meds  -Initiate plavix  -Follow up with Dr. Baker  -Check labs/EKG/site check in AM  -Probable discharge in AM  -cardiac rehab info provided/referral and communication to cardiac rehab completed

## 2021-02-16 NOTE — ASU PATIENT PROFILE, ADULT - PSH
History of esophagectomy    S/P colectomy  Right Hemicolectomy secondary to polyps.  S/P colonoscopy    S/P hernia surgery  Umblical Hernia Repair

## 2021-02-16 NOTE — DISCHARGE NOTE PROVIDER - NSDCMRMEDTOKEN_GEN_ALL_CORE_FT
albuterol: 2 puff(s) inhaled every 4 hours, As Needed  aspirin 81 mg oral tablet: 1 tab(s) orally once a day  Calcium 600+D oral tablet: 1 tab(s) orally once a day  Centrum oral tablet: 1 tab(s) orally once a day  Dexilant 60 mg oral delayed release capsule: 1 cap(s) orally once a day  famotidine 20 mg oral tablet:   Fish Oil 1200 mg oral capsule:  orally   metFORMIN 500 mg oral tablet, extended release: 1 tab(s) orally once a day  ramipril 5 mg oral capsule: 1 cap(s) orally once a day  rosuvastatin 20 mg oral tablet: 1 tab(s) orally once a day  terazosin 1 mg oral capsule: 1 cap(s) orally once a day (at bedtime)  Toprol-XL 25 mg oral tablet, extended release: 1 tab(s) orally once a day  vitamin b complex with c: 1 tab(s) orally once a day   albuterol: 2 puff(s) inhaled every 4 hours, As Needed  aspirin 81 mg oral tablet: 1 tab(s) orally once a day  atorvastatin 80 mg oral tablet: 1 tab(s) orally once a day (at bedtime)  Calcium 600+D oral tablet: 1 tab(s) orally once a day  Centrum oral tablet: 1 tab(s) orally once a day  clopidogrel 75 mg oral tablet: 1 tab(s) orally once a day  Dexilant 60 mg oral delayed release capsule: 1 cap(s) orally once a day  famotidine 20 mg oral tablet:   Fish Oil 1200 mg oral capsule:  orally   metFORMIN 500 mg oral tablet, extended release: 1 tab(s) orally once a day- RESTART on Friday 2/19/21.   ramipril 5 mg oral capsule: 1 cap(s) orally once a day  terazosin 1 mg oral capsule: 1 cap(s) orally once a day (at bedtime)  Toprol-XL 25 mg oral tablet, extended release: 1 tab(s) orally once a day  vitamin b complex with c: 1 tab(s) orally once a day   albuterol: 2 puff(s) inhaled every 4 hours, As Needed  atorvastatin 80 mg oral tablet: 1 tab(s) orally once a day (at bedtime)  Calcium 600+D oral tablet: 1 tab(s) orally once a day  Centrum oral tablet: 1 tab(s) orally once a day  clopidogrel 75 mg oral tablet: 1 tab(s) orally once a day  Dexilant 60 mg oral delayed release capsule: 1 cap(s) orally once a day  famotidine 20 mg oral tablet:   Fish Oil 1200 mg oral capsule:  orally   metFORMIN 500 mg oral tablet, extended release: 1 tab(s) orally once a day- RESTART on Friday 2/19/21.   ramipril 5 mg oral capsule: 1 cap(s) orally once a day  terazosin 1 mg oral capsule: 1 cap(s) orally once a day (at bedtime)  Toprol-XL 25 mg oral tablet, extended release: 1 tab(s) orally once a day  vitamin b complex with c: 1 tab(s) orally once a day

## 2021-02-16 NOTE — DISCHARGE NOTE PROVIDER - CARE PROVIDER_API CALL
Fortunato Baker)  Cardiovascular Disease  [address] [facility], [place] [address]  Phone: [phone]  Fax: [phone]  Follow Up Time:

## 2021-02-17 ENCOUNTER — TRANSCRIPTION ENCOUNTER (OUTPATIENT)
Age: 76
End: 2021-02-17

## 2021-02-17 VITALS
OXYGEN SATURATION: 97 % | RESPIRATION RATE: 16 BRPM | SYSTOLIC BLOOD PRESSURE: 151 MMHG | DIASTOLIC BLOOD PRESSURE: 82 MMHG | HEART RATE: 85 BPM

## 2021-02-17 LAB
A1C WITH ESTIMATED AVERAGE GLUCOSE RESULT: 6.4 % — HIGH (ref 4–5.6)
ANION GAP SERPL CALC-SCNC: 11 MMOL/L — SIGNIFICANT CHANGE UP (ref 5–17)
BUN SERPL-MCNC: 13 MG/DL — SIGNIFICANT CHANGE UP (ref 8–20)
CALCIUM SERPL-MCNC: 9.1 MG/DL — SIGNIFICANT CHANGE UP (ref 8.6–10.2)
CHLORIDE SERPL-SCNC: 102 MMOL/L — SIGNIFICANT CHANGE UP (ref 98–107)
CO2 SERPL-SCNC: 23 MMOL/L — SIGNIFICANT CHANGE UP (ref 22–29)
CREAT SERPL-MCNC: 0.87 MG/DL — SIGNIFICANT CHANGE UP (ref 0.5–1.3)
ESTIMATED AVERAGE GLUCOSE: 137 MG/DL — HIGH (ref 68–114)
GLUCOSE SERPL-MCNC: 142 MG/DL — HIGH (ref 70–99)
HCT VFR BLD CALC: 45.8 % — SIGNIFICANT CHANGE UP (ref 39–50)
HGB BLD-MCNC: 15.5 G/DL — SIGNIFICANT CHANGE UP (ref 13–17)
MAGNESIUM SERPL-MCNC: 2.2 MG/DL — SIGNIFICANT CHANGE UP (ref 1.6–2.6)
MCHC RBC-ENTMCNC: 29.6 PG — SIGNIFICANT CHANGE UP (ref 27–34)
MCHC RBC-ENTMCNC: 33.8 GM/DL — SIGNIFICANT CHANGE UP (ref 32–36)
MCV RBC AUTO: 87.4 FL — SIGNIFICANT CHANGE UP (ref 80–100)
PLATELET # BLD AUTO: 310 K/UL — SIGNIFICANT CHANGE UP (ref 150–400)
POTASSIUM SERPL-MCNC: 4.2 MMOL/L — SIGNIFICANT CHANGE UP (ref 3.5–5.3)
POTASSIUM SERPL-SCNC: 4.2 MMOL/L — SIGNIFICANT CHANGE UP (ref 3.5–5.3)
RBC # BLD: 5.24 M/UL — SIGNIFICANT CHANGE UP (ref 4.2–5.8)
RBC # FLD: 14.4 % — SIGNIFICANT CHANGE UP (ref 10.3–14.5)
SODIUM SERPL-SCNC: 136 MMOL/L — SIGNIFICANT CHANGE UP (ref 135–145)
WBC # BLD: 10.96 K/UL — HIGH (ref 3.8–10.5)
WBC # FLD AUTO: 10.96 K/UL — HIGH (ref 3.8–10.5)

## 2021-02-17 PROCEDURE — 93458 L HRT ARTERY/VENTRICLE ANGIO: CPT | Mod: XU

## 2021-02-17 PROCEDURE — C1894: CPT

## 2021-02-17 PROCEDURE — C1887: CPT

## 2021-02-17 PROCEDURE — 82962 GLUCOSE BLOOD TEST: CPT

## 2021-02-17 PROCEDURE — C1769: CPT

## 2021-02-17 PROCEDURE — 83036 HEMOGLOBIN GLYCOSYLATED A1C: CPT

## 2021-02-17 PROCEDURE — 36415 COLL VENOUS BLD VENIPUNCTURE: CPT

## 2021-02-17 PROCEDURE — 85027 COMPLETE CBC AUTOMATED: CPT

## 2021-02-17 PROCEDURE — 93005 ELECTROCARDIOGRAM TRACING: CPT

## 2021-02-17 PROCEDURE — 85610 PROTHROMBIN TIME: CPT

## 2021-02-17 PROCEDURE — 99153 MOD SED SAME PHYS/QHP EA: CPT

## 2021-02-17 PROCEDURE — C1725: CPT

## 2021-02-17 PROCEDURE — 99152 MOD SED SAME PHYS/QHP 5/>YRS: CPT

## 2021-02-17 PROCEDURE — 93010 ELECTROCARDIOGRAM REPORT: CPT

## 2021-02-17 PROCEDURE — 80048 BASIC METABOLIC PNL TOTAL CA: CPT

## 2021-02-17 PROCEDURE — 85730 THROMBOPLASTIN TIME PARTIAL: CPT

## 2021-02-17 PROCEDURE — C9600: CPT | Mod: RC

## 2021-02-17 PROCEDURE — 83735 ASSAY OF MAGNESIUM: CPT

## 2021-02-17 PROCEDURE — C1874: CPT

## 2021-02-17 RX ORDER — METFORMIN HYDROCHLORIDE 850 MG/1
1 TABLET ORAL
Qty: 0 | Refills: 0 | DISCHARGE

## 2021-02-17 RX ORDER — ATORVASTATIN CALCIUM 80 MG/1
1 TABLET, FILM COATED ORAL
Qty: 90 | Refills: 3
Start: 2021-02-17 | End: 2022-02-11

## 2021-02-17 RX ORDER — ROSUVASTATIN CALCIUM 5 MG/1
1 TABLET ORAL
Qty: 0 | Refills: 0 | DISCHARGE

## 2021-02-17 RX ORDER — ASPIRIN/CALCIUM CARB/MAGNESIUM 324 MG
1 TABLET ORAL
Qty: 90 | Refills: 3
Start: 2021-02-17 | End: 2022-02-11

## 2021-02-17 RX ORDER — CLOPIDOGREL BISULFATE 75 MG/1
1 TABLET, FILM COATED ORAL
Qty: 90 | Refills: 4
Start: 2021-02-17 | End: 2022-05-12

## 2021-02-17 RX ORDER — ATORVASTATIN CALCIUM 80 MG/1
1 TABLET, FILM COATED ORAL
Qty: 90 | Refills: 4
Start: 2021-02-17 | End: 2022-05-12

## 2021-02-17 RX ADMIN — Medication 25 MILLIGRAM(S): at 06:34

## 2021-02-17 NOTE — DISCHARGE NOTE NURSING/CASE MANAGEMENT/SOCIAL WORK - PATIENT PORTAL LINK FT
You can access the FollowMyHealth Patient Portal offered by [facility] by registering at the following website: [email or web address]. By joining [facility]’s FollowMyHealth portal, you will also be able to view your health information using other applications (apps) compatible with our system.

## 2021-02-17 NOTE — PROGRESS NOTE ADULT - SUBJECTIVE AND OBJECTIVE BOX
Department of Cardiology                                                                  [facility]/[place] [address]                                                            Telephone: [phone]. Fax:[phone]                                                                                      Cardiac Cath NP Note       Narrative:  75y  Male is now s/p left heart catheterization via (right or left radial or groin) approach with  ____which showed:         PAST MEDICAL & SURGICAL HISTORY:  History of cerebral aneurysm    LEILA on CPAP    H/O carotid artery stenosis    Diabetes mellitus    BPH (benign prostatic hyperplasia)    Pneumonia    Sleep apnea    High cholesterol    GERD (gastroesophageal reflux disease)    Emphysema    Hypertension    Esophageal cancer    S/P coil embolization of cerebral aneurysm    History of esophagectomy    S/P colectomy  Right Hemicolectomy secondary to polyps.    S/P colonoscopy    S/P hernia surgery  Umblical Hernia Repair      FAMILY HISTORY:  Family history of hypertension (Mother)    Family history of coronary artery disease (Sibling, Mother)    Family history of cancer (Sibling)  Colon Cancer - Brother  Lung Cancer - Brother      Home Medications:  albuterol: 2 puff(s) inhaled every 4 hours, As Needed (16 Feb 2021 08:21)  Calcium 600+D oral tablet: 1 tab(s) orally once a day (16 Feb 2021 19:36)  Centrum oral tablet: 1 tab(s) orally once a day (16 Feb 2021 19:36)  Dexilant 60 mg oral delayed release capsule: 1 cap(s) orally once a day (16 Feb 2021 08:21)  famotidine 20 mg oral tablet:  (16 Feb 2021 19:36)  Fish Oil 1200 mg oral capsule:  orally  (16 Feb 2021 08:21)  metFORMIN 500 mg oral tablet, extended release: 1 tab(s) orally once a day- RESTART on Friday 2/19/21.  (17 Feb 2021 07:00)  ramipril 5 mg oral capsule: 1 cap(s) orally once a day (16 Feb 2021 08:21)  terazosin 1 mg oral capsule: 1 cap(s) orally once a day (at bedtime) (16 Feb 2021 08:21)  Toprol-XL 25 mg oral tablet, extended release: 1 tab(s) orally once a day (16 Feb 2021 08:21)  vitamin b complex with c: 1 tab(s) orally once a day (16 Feb 2021 19:36)                     15.5   10.96 )-----------( 310      ( 17 Feb 2021 05:06 )             45.8     02-17    136  |  102  |  13.0  ----------------------------<  142<H>  4.2   |  23.0  |  0.87    Ca    9.1      17 Feb 2021 05:06  Mg     2.2     02-17    PT/INR - ( 16 Feb 2021 07:38 )   PT: 11.6 sec;   INR: 1.00 ratio    PTT - ( 16 Feb 2021 07:38 )  PTT:29.0 sec        Objective:  Vital Signs Last 24 Hrs  T(C): 36.6 (16 Feb 2021 08:01), Max: 36.6 (16 Feb 2021 08:01)  T(F): 97.9 (16 Feb 2021 08:01), Max: 97.9 (16 Feb 2021 08:01)  HR: 85 (17 Feb 2021 07:33) (53 - 85)  BP: 151/82 (17 Feb 2021 07:33) (140/63 - 196/93)  BP(mean): --  RR: 16 (17 Feb 2021 07:33) (14 - 18)  SpO2: 97% (17 Feb 2021 07:33) (93% - 97%)    CM: SR  Neuro: A&OX3, CN 2-12 intact  HEENT: NC, AT  Lungs: CTA B/L  CV: S1, S2, no murmur, RRR  Abd: Soft  Right Radial: Soft, no bleeding, no hematoma; palpable pulses   Extremity: + distal pulses  EKG:   NSR          DIAGNOSTICS:     Assessment of LVEF (Must be within 6 months):       EF: 65-70%       Assessed by: echo       Date: 9/15/2017    Prior Cardiac Interventions (LHC, stents, CABG): NONE        Carotid duplex -Right carotid system-moderate atherosclerosis seen without evidence of hemodynamically significant stenosis  Left Carotid system-mild atherosclerosis seen without evidence of hemodynamically significant stenosis.    Noninvasive Testing:   Stress Test: Date: 1/12/2021-mildly abnormal study. The LV is normal in size. There is a small, moderate defect in apical lateral wall that is reversible, suggestive of mild ischemia.       Myocardial Imaging: very small moderate defect in apical anterior wall that is reversible, suggestive of mild ischemia       Risk Assessment (Low, Medium, High): low    EKG-NSR HR-73 non-specific t-wave changes  Echo (Date, Findings): 9/15/1236-IAEG-96-70%, trace mitral valve regurg, no evidence of pericardial effusion    ASSESSMENT AND PLAN:    75 year old male with a PMH of HTN, HLD, carotid disease, former smoker, COPD, DM, malignant neoplasm of esophagus, and dyspnea,( moderate level of exertion)  presenting to the Cath holding area this am for a LHC secondary to increasing SOB. Patient now status post left heart catheterization which required 3 drug eluting stents to Right Coronary Artery (80% stenosis of proximal RCA, 90% mid RCA, circumflex 50-60% stenosis). Given intervention to right coronary artery you were started on Plavix 75 mg daily and will continue with Aspirin 81 mg daily. On discharge patient to follow with Dr. Merritt, office will call to confirm appointment.       -vss reviewed, no events overnigh  -right radial without bleeding or hematoma; has palpable pulses   -BP stable overnight; (pts baseline ;s); on d/c /82  -continue current medical therapy  -pt to continue with Aspirin 81 mg daily and Plavix 75 mg daily (Rx sent to pts pharmacy)  -will d/c on high dose statin; atorvastatin 80 mg daily   -patient to follow with Dr. Baker; appt requested from office   -cardiac rehab info provided/referral and communication to cardiac rehab completed                                                                                  Department of Cardiology                                                                  [facility]/[place] [address] [address] [facility]                                                            Telephone: [phone]. Fax:[phone]                                                                   Progress Note: Cardiac Cath NP        Narrative:    75 year old male with a PMH of HTN, HLD, carotid disease, former smoker, COPD, DM, malignant neoplasm of esophagus, and dyspnea,( moderate level of exertion)  presenting to the Cath holding area this am for a LHC secondary to increasing SOB. Patient now status post left heart catheterization which required 3 drug eluting stents to Right Coronary Artery (80% stenosis of proximal RCA, 90% mid RCA, circumflex 50-60% stenosis). Given intervention to right coronary artery you were started on Plavix 75 mg daily and will continue with Aspirin 81 mg daily. On discharge patient to follow with Dr. Merritt, office will call to confirm appointment.             PAST MEDICAL & SURGICAL HISTORY:  History of cerebral aneurysm  LEILA on CPAP  H/O carotid artery stenosis  Diabetes mellitus  BPH (benign prostatic hyperplasia)  Pneumonia  Sleep apnea  High cholesterol  GERD (gastroesophageal reflux disease)  Emphysema  Hypertension  Esophageal cancer  S/P coil embolization of cerebral aneurysm  History of esophagectomy  S/P colectomy  Right Hemicolectomy secondary to polyps.  S/P colonoscopy  S/P hernia surgery  Umblical Hernia Repair      FAMILY HISTORY:  Family history of hypertension (Mother)    Family history of coronary artery disease (Sibling, Mother)    Family history of cancer (Sibling)  Colon Cancer - Brother  Lung Cancer - Brother      Home Medications:  albuterol: 2 puff(s) inhaled every 4 hours, As Needed (16 Feb 2021 08:21)  Calcium 600+D oral tablet: 1 tab(s) orally once a day (16 Feb 2021 19:36)  Centrum oral tablet: 1 tab(s) orally once a day (16 Feb 2021 19:36)  Dexilant 60 mg oral delayed release capsule: 1 cap(s) orally once a day (16 Feb 2021 08:21)  famotidine 20 mg oral tablet:  (16 Feb 2021 19:36)  Fish Oil 1200 mg oral capsule:  orally  (16 Feb 2021 08:21)  metFORMIN 500 mg oral tablet, extended release: 1 tab(s) orally once a day- RESTART on Friday 2/19/21.  (17 Feb 2021 07:00)  ramipril 5 mg oral capsule: 1 cap(s) orally once a day (16 Feb 2021 08:21)  terazosin 1 mg oral capsule: 1 cap(s) orally once a day (at bedtime) (16 Feb 2021 08:21)  Toprol-XL 25 mg oral tablet, extended release: 1 tab(s) orally once a day (16 Feb 2021 08:21)  vitamin b complex with c: 1 tab(s) orally once a day (16 Feb 2021 19:36)                     15.5   10.96 )-----------( 310      ( 17 Feb 2021 05:06 )             45.8     02-17    136  |  102  |  13.0  ----------------------------<  142<H>  4.2   |  23.0  |  0.87    Ca    9.1      17 Feb 2021 05:06  Mg     2.2     02-17    PT/INR - ( 16 Feb 2021 07:38 )   PT: 11.6 sec;   INR: 1.00 ratio    PTT - ( 16 Feb 2021 07:38 )  PTT:29.0 sec        Objective:  Vital Signs Last 24 Hrs  T(C): 36.6 (16 Feb 2021 08:01), Max: 36.6 (16 Feb 2021 08:01)  T(F): 97.9 (16 Feb 2021 08:01), Max: 97.9 (16 Feb 2021 08:01)  HR: 85 (17 Feb 2021 07:33) (53 - 85)  BP: 151/82 (17 Feb 2021 07:33) (140/63 - 196/93)  BP(mean): --  RR: 16 (17 Feb 2021 07:33) (14 - 18)  SpO2: 97% (17 Feb 2021 07:33) (93% - 97%)    CM: SR  Neuro: A&OX3, CN 2-12 intact  HEENT: NC, AT  Lungs: CTA B/L  CV: S1, S2, no murmur, RRR  Abd: Soft  Right Radial: Soft, no bleeding, no hematoma; palpable pulses   Extremity: + distal pulses  EKG:   NSR          DIAGNOSTICS:     Assessment of LVEF (Must be within 6 months):       EF: 65-70%       Assessed by: echo       Date: 9/15/2017    Prior Cardiac Interventions (LHC, stents, CABG): NONE        Carotid duplex -Right carotid system-moderate atherosclerosis seen without evidence of hemodynamically significant stenosis  Left Carotid system-mild atherosclerosis seen without evidence of hemodynamically significant stenosis.    Noninvasive Testing:   Stress Test: Date: 1/12/2021-mildly abnormal study. The LV is normal in size. There is a small, moderate defect in apical lateral wall that is reversible, suggestive of mild ischemia.       Myocardial Imaging: very small moderate defect in apical anterior wall that is reversible, suggestive of mild ischemia       Risk Assessment (Low, Medium, High): low    EKG-NSR HR-73 non-specific t-wave changes  Echo (Date, Findings): 9/15/2156-SETD-18-70%, trace mitral valve regurg, no evidence of pericardial effusion        ASSESSMENT AND PLAN:    75 year old male with a PMH of HTN, HLD, carotid disease, former smoker, COPD, DM, malignant neoplasm of esophagus, and dyspnea,( moderate level of exertion)  presenting to the Cath holding area this am for a LHC secondary to increasing SOB. Patient now status post left heart catheterization which required 3 drug eluting stents to Right Coronary Artery (80% stenosis of proximal RCA, 90% mid RCA, circumflex 50-60% stenosis). Given intervention to right coronary artery you were started on Plavix 75 mg daily and will continue with Aspirin 81 mg daily. On discharge patient to follow with Dr. Merritt, office will call to confirm appointment.       -vss reviewed, no events overnigh  -right radial without bleeding or hematoma; has palpable pulses   -BP stable overnight; (pts baseline ;s); on d/c /82  -continue current medical therapy  -pt to continue with Aspirin 81 mg daily and Plavix 75 mg daily (Rx sent to pts pharmacy)  -will d/c on high dose statin; atorvastatin 80 mg daily   -patient to follow with Dr. Baker; appt requested from office   -cardiac rehab info provided/referral and communication to cardiac rehab completed

## 2021-02-19 PROBLEM — Z86.79 PERSONAL HISTORY OF OTHER DISEASES OF THE CIRCULATORY SYSTEM: Chronic | Status: ACTIVE | Noted: 2021-02-16

## 2021-02-19 PROBLEM — G47.33 OBSTRUCTIVE SLEEP APNEA (ADULT) (PEDIATRIC): Chronic | Status: ACTIVE | Noted: 2021-02-16

## 2021-02-19 PROBLEM — E11.9 TYPE 2 DIABETES MELLITUS WITHOUT COMPLICATIONS: Chronic | Status: ACTIVE | Noted: 2021-02-16

## 2021-02-23 ENCOUNTER — APPOINTMENT (OUTPATIENT)
Dept: UROLOGY | Facility: CLINIC | Age: 76
End: 2021-02-23
Payer: MEDICARE

## 2021-02-23 VITALS
DIASTOLIC BLOOD PRESSURE: 83 MMHG | WEIGHT: 176 LBS | RESPIRATION RATE: 15 BRPM | HEART RATE: 77 BPM | BODY MASS INDEX: 28.28 KG/M2 | SYSTOLIC BLOOD PRESSURE: 183 MMHG | HEIGHT: 66 IN | TEMPERATURE: 96.9 F

## 2021-02-23 DIAGNOSIS — N52.9 MALE ERECTILE DYSFUNCTION, UNSPECIFIED: ICD-10-CM

## 2021-02-23 DIAGNOSIS — N28.1 CYST OF KIDNEY, ACQUIRED: ICD-10-CM

## 2021-02-23 PROCEDURE — 51798 US URINE CAPACITY MEASURE: CPT | Mod: 59

## 2021-02-23 PROCEDURE — 51741 ELECTRO-UROFLOWMETRY FIRST: CPT

## 2021-02-23 PROCEDURE — 99214 OFFICE O/P EST MOD 30 MIN: CPT | Mod: 25

## 2021-02-23 PROCEDURE — 99072 ADDL SUPL MATRL&STAF TM PHE: CPT

## 2021-02-24 PROBLEM — N28.1 RENAL CYST, ACQUIRED: Status: ACTIVE | Noted: 2018-03-19

## 2021-02-24 PROBLEM — N52.9 ORGANIC ERECTILE DYSFUNCTION: Status: ACTIVE | Noted: 2021-02-24

## 2021-02-24 NOTE — HISTORY OF PRESENT ILLNESS
[FreeTextEntry1] : 74 yo male presents for Renal cyst. \par Status post Esophagectomy and recently had CT scan, which showed Renal cyst. Had Renal Ultrasound also. \par Taking Flomax- urinating well, daytime frequency 4-5 x, nocturia- 0-1 x.\par Has off and on urgency, urge incontinence and post void dribbling \par Denies dysuria, hematuria, lower abdominal or flank pain, fever, chills or rigors. \par Still has off and on pain in right testis, less than before. \par Takes Sildenafil 20 mg 3-4 tabs gets partial response: 2/5. \par  \par 1 week ago had cardiac stent. \par \par \par \par

## 2021-02-24 NOTE — PHYSICAL EXAM
[Urethral Meatus] : meatus normal [Penis Abnormality] : normal circumcised penis [Scrotum] : the scrotum was normal [Testes Tenderness] : no tenderness of the testes [Testes Mass (___cm)] : there were no testicular masses [Prostate Tenderness] : the prostate was not tender [No Prostate Nodules] : no prostate nodules [Prostate Size ___ (0-4)] : prostate size [unfilled] (scale: 0-4) [Normal Appearance] : normal appearance [General Appearance - In No Acute Distress] : no acute distress [Abdomen Soft] : soft [Abdomen Tenderness] : non-tender [Costovertebral Angle Tenderness] : no ~M costovertebral angle tenderness [Skin Color & Pigmentation] : normal skin color and pigmentation [Oriented To Time, Place, And Person] : oriented to person, place, and time [] : no respiratory distress [Normal Station and Gait] : the gait and station were normal for the patient's age [No Focal Deficits] : no focal deficits [FreeTextEntry1] : normal peripheral circulation

## 2021-02-24 NOTE — ASSESSMENT
[FreeTextEntry1] : Reviewed outside records. \par Stable Renal cyst.\par \par Benign Prostatic Hyperplasia:\par See Uroflo and PVR. \par Continue Flomax. \par \par Erectile dysfunction:\par Reviewed pathophysiology and differential diagnosis of erectile dysfunction with the patient. Discussed lifestyle changes. \par The patient was made aware that the current therapies for erectile dysfunction consisting of oral phosphodiesterase type 5 inhibitors, intra-urethral alprostadil, intracavernous vasoactive drug injection, vacuum constriction devices and penile prosthesis implantation. Relative risks and benefits, were discussed. All questions were answered.\par Recommended to check with Cardiology regarding Phosphodiesterase 5 inhibitors use. \par \par Renal cyst:\par Stable Renal cyst. \par Will do Renal Ultrasound with PCP before follow up appointment. \par \par Return to office in 1 year or sooner if any issues- will do Uroflo/PVR.

## 2021-02-24 NOTE — LETTER BODY
[Dear  ___] : Dear  [unfilled], [Courtesy Letter:] : I had the pleasure of seeing your patient, [unfilled], in my office today. [Please see my note below.] : Please see my note below. [Sincerely,] : Sincerely, [FreeTextEntry3] : Cleveland Gutierres MD\par  of Urology\par [employer] [facility] [employer]\par \par Offices:\par [facility] @\par [address], [place] [address]\par and\par [address], [place] [address], [address]\par and\par [address] [place] [address]\par \par TEL: [phone]\par FAX: [phone]

## 2021-02-28 ENCOUNTER — EMERGENCY (EMERGENCY)
Facility: HOSPITAL | Age: 76
LOS: 1 days | Discharge: HOME | End: 2021-02-28
Attending: EMERGENCY MEDICINE
Payer: COMMERCIAL

## 2021-02-28 VITALS
HEART RATE: 82 BPM | TEMPERATURE: 98 F | WEIGHT: 175.05 LBS | SYSTOLIC BLOOD PRESSURE: 177 MMHG | OXYGEN SATURATION: 95 % | HEIGHT: 69 IN | RESPIRATION RATE: 18 BRPM | DIASTOLIC BLOOD PRESSURE: 88 MMHG

## 2021-02-28 VITALS — WEIGHT: 180.78 LBS

## 2021-02-28 DIAGNOSIS — Z98.89 OTHER SPECIFIED POSTPROCEDURAL STATES: Chronic | ICD-10-CM

## 2021-02-28 DIAGNOSIS — Z98.890 OTHER SPECIFIED POSTPROCEDURAL STATES: Chronic | ICD-10-CM

## 2021-02-28 PROCEDURE — 99284 EMERGENCY DEPT VISIT MOD MDM: CPT

## 2021-02-28 PROCEDURE — 99284 EMERGENCY DEPT VISIT MOD MDM: CPT | Mod: 25

## 2021-02-28 PROCEDURE — 70450 CT HEAD/BRAIN W/O DYE: CPT

## 2021-02-28 PROCEDURE — 70450 CT HEAD/BRAIN W/O DYE: CPT | Mod: 26,MA

## 2021-02-28 NOTE — ED PROVIDER NOTE - CARE PROVIDER_API CALL
Roman Coello)  Otolaryngology  [address] [place] [address], [place] [address]  Phone: [phone]  Fax: [phone]  Follow Up Time:

## 2021-02-28 NOTE — ED PROVIDER NOTE - OBJECTIVE STATEMENT
75 year old male with hx of cerebral aneurysm, HTN, HLD, carotid disease, former smoker, COPD, DM, malignant neoplasm of esophagus, and dyspnea,( moderate level of exertion) who presented with ear bleeding. On 2/16/21 the patient underwent LHC with placement of 3 drug eluting stents. He was started on plavix and aspirin. This afternoon at 1330 the patient was at a store, bending over, when his forehead struck a metal bar. He did not lose consciousness. Then at 1700 the patient was eating dinner when his daughter noticed bleeding from his right ear. She dabbed his ear with tissues. Patient states it bled "just a little." This happened again a little while later. Since then the ear has not bled. Patient is otherwise asymptomatic.

## 2021-02-28 NOTE — ED PROVIDER NOTE - PHYSICAL EXAMINATION
General: well appearing, NAD  Head: NC, AT  EENT: EOMI, no scleral icterus. Left ear with normal appearance of TM, ear canal, pinna, and mastoid. Right ear with normal appearance of TM, 2 small areas of dried blood near entrance of ear canal. No active bleeding. No battles sign.   Cardiac: RRR, no apparent murmurs  Respiratory: no respiratory distress   MSK/Vascular: full ROM, soft compartments, warm extremities  Neuro: AAOx3, sensation to light touch intact  Psych: calm, cooperative

## 2021-02-28 NOTE — ED PROVIDER NOTE - PSH
History of esophagectomy    S/P coil embolization of cerebral aneurysm    S/P colectomy  Right Hemicolectomy secondary to polyps.  S/P colonoscopy    S/P hernia surgery  Umblical Hernia Repair

## 2021-02-28 NOTE — ED PROVIDER NOTE - ATTENDING CONTRIBUTION TO CARE
Pt. is awake and alert. NO acute distress. No active bleeding to left ear. Left ear with normal appearance of TM, ear canal, pinna, and mastoid. Right ear with normal appearance of TM, 2 small areas of dried blood near entrance of ear canal. No active bleeding. No battles sign.  I, Dr. Thomas, performed a face to face bedside interview with this patient regarding history of present illness, review of symptoms and relevant past medical, social and family history.  I completed an independent physical examination.  I have also reviewed the resident's note(s) and discussed the plan with the resident.

## 2021-02-28 NOTE — ED STATDOCS - PROGRESS NOTE DETAILS
[facility] for ED attending Dr. Gillis. 76 y/o M pt with significant PMHx of BPH, Diabetes mellitus, Emphysema, Esophageal cancer, GERD, H/O carotid artery stenosis, High cholesterol, History of cerebral aneurysm, Hypertension, LEILA on CPAP and Pneumonia on Plavix presents to the ED c/o bleeding from right ear x 1 day. Pt states very minor head trauma when he was [employer] earlier. Pt will be sent to [facility] for further evaluation.    Denies ear pain, Q-tip use, CP and HA

## 2021-02-28 NOTE — ED PROVIDER NOTE - NS ED ROS FT
Constitutional: no fever, no chills  Head: NC, AT   Eyes: no redness   ENMT: +ear bleeding, no nasal congestion/drainage, no sore throat   CV: no chest pain, no edema  Resp: no cough, no dyspnea  GI: no abdominal pain, no nausea, no vomiting, no diarrhea  : no dysuria, no hematuria   Skin: no lesions, no rashes   Neuro: no LOC, no headache, no sensory deficits, no weakness

## 2021-02-28 NOTE — ED STATDOCS - PMH
BPH (benign prostatic hyperplasia)    Diabetes mellitus    Emphysema    Esophageal cancer    GERD (gastroesophageal reflux disease)    H/O carotid artery stenosis    High cholesterol    History of cerebral aneurysm    Hypertension    LEILA on CPAP    Pneumonia    Sleep apnea

## 2021-02-28 NOTE — ED PROVIDER NOTE - PATIENT PORTAL LINK FT
You can access the FollowMyHealth Patient Portal offered by [facility] by registering at the following website: [email or web address]. By joining [facility]’s FollowMyHealth portal, you will also be able to view your health information using other applications (apps) compatible with our system.

## 2021-02-28 NOTE — ED ADULT TRIAGE NOTE - CHIEF COMPLAINT QUOTE
c/o bleeding from R ear x1 day, recently started on plavix for cardiac stent placement last week. denies pain. denies head trauma/falls. no active bleeding noted in triage

## 2021-02-28 NOTE — ED PROVIDER NOTE - CLINICAL SUMMARY MEDICAL DECISION MAKING FREE TEXT BOX
75 year old male with hx of cerebral aneurysm and stent placement 2/16 who presents with R ear bleeding. Exam only remarkable for 2 small areas of dried blood  near entrance of ear canal. Head CT negative. Patient will be discharged home with ENT followup.

## 2021-03-04 ENCOUNTER — APPOINTMENT (OUTPATIENT)
Dept: CARDIOLOGY | Facility: CLINIC | Age: 76
End: 2021-03-04

## 2021-03-08 ENCOUNTER — APPOINTMENT (OUTPATIENT)
Dept: CARDIOLOGY | Facility: CLINIC | Age: 76
End: 2021-03-08
Payer: MEDICARE

## 2021-03-08 ENCOUNTER — NON-APPOINTMENT (OUTPATIENT)
Age: 76
End: 2021-03-08

## 2021-03-08 VITALS
OXYGEN SATURATION: 98 % | DIASTOLIC BLOOD PRESSURE: 66 MMHG | TEMPERATURE: 96.9 F | SYSTOLIC BLOOD PRESSURE: 150 MMHG | WEIGHT: 172 LBS | BODY MASS INDEX: 27.64 KG/M2 | HEART RATE: 67 BPM | HEIGHT: 66 IN

## 2021-03-08 PROCEDURE — 93000 ELECTROCARDIOGRAM COMPLETE: CPT

## 2021-03-08 PROCEDURE — 99214 OFFICE O/P EST MOD 30 MIN: CPT

## 2021-03-08 PROCEDURE — 99072 ADDL SUPL MATRL&STAF TM PHE: CPT

## 2021-03-08 RX ORDER — ROSUVASTATIN CALCIUM 20 MG/1
20 TABLET, FILM COATED ORAL DAILY
Refills: 0 | Status: DISCONTINUED | COMMUNITY
End: 2021-03-08

## 2021-03-12 ENCOUNTER — NON-APPOINTMENT (OUTPATIENT)
Age: 76
End: 2021-03-12

## 2021-03-15 ENCOUNTER — NON-APPOINTMENT (OUTPATIENT)
Age: 76
End: 2021-03-15

## 2021-05-24 RX ORDER — EVOLOCUMAB 140 MG/ML
140 INJECTION, SOLUTION SUBCUTANEOUS
Qty: 1 | Refills: 6 | Status: DISCONTINUED | COMMUNITY
Start: 2021-03-08 | End: 2021-05-24

## 2021-08-09 ENCOUNTER — NON-APPOINTMENT (OUTPATIENT)
Age: 76
End: 2021-08-09

## 2021-08-31 ENCOUNTER — APPOINTMENT (OUTPATIENT)
Dept: PULMONOLOGY | Facility: CLINIC | Age: 76
End: 2021-08-31
Payer: MEDICARE

## 2021-08-31 VITALS
WEIGHT: 172 LBS | BODY MASS INDEX: 27.32 KG/M2 | RESPIRATION RATE: 16 BRPM | OXYGEN SATURATION: 96 % | HEART RATE: 59 BPM | HEIGHT: 66.5 IN | SYSTOLIC BLOOD PRESSURE: 120 MMHG | DIASTOLIC BLOOD PRESSURE: 70 MMHG

## 2021-08-31 PROCEDURE — 99204 OFFICE O/P NEW MOD 45 MIN: CPT

## 2021-08-31 NOTE — HISTORY OF PRESENT ILLNESS
[TextBox_4] : The patient was last seen here 4 years ago. He has obstructive sleep apnea on CPAP 11. He has been faithful with his CPAP machine. He is alert on it but reports he could use more masked replacements.\par \par The patient has mild COPD on Symbicort and doing well. Denies any cough wheeze or increased shortness of breath.\par \par Status post esophageal CA being followed by cardiothoracic surgery without any recent occurrence. Patient had a CT scan done last year without any significant abnormalities. [Obstructive Sleep Apnea] : obstructive sleep apnea [Lab] : lab [CPAP:] : CPAP [TextBox_100] : 1/15 [TextBox_108] : 43 [TextBox_116] : 86 [TextBox_127] : 7/21 [TextBox_131] : 11 [TextBox_129] : 8/21 [TextBox_133] : 100 [TextBox_137] : 83 [TextBox_141] : 5 [TextBox_143] : 54 [TextBox_147] : 0.2 [TextBox_165] : Alert on CPAP.

## 2021-08-31 NOTE — ASSESSMENT
[FreeTextEntry1] : Severe Sleep apnea with excellent compliance and benefit from CPAP. His current machine has 5800 hours on it and is still operational. Supplies were renewed. Ordered extra cushions for him.\par \par Moderate COPD on Symbicort. This will be continued.\par \par Followup in one year.

## 2021-10-07 ENCOUNTER — APPOINTMENT (OUTPATIENT)
Dept: CARDIOLOGY | Facility: CLINIC | Age: 76
End: 2021-10-07
Payer: MEDICARE

## 2021-10-07 ENCOUNTER — NON-APPOINTMENT (OUTPATIENT)
Age: 76
End: 2021-10-07

## 2021-10-07 VITALS
OXYGEN SATURATION: 95 % | SYSTOLIC BLOOD PRESSURE: 150 MMHG | HEIGHT: 66.5 IN | HEART RATE: 58 BPM | BODY MASS INDEX: 27.32 KG/M2 | TEMPERATURE: 98 F | DIASTOLIC BLOOD PRESSURE: 72 MMHG | WEIGHT: 172 LBS

## 2021-10-07 DIAGNOSIS — I49.3 VENTRICULAR PREMATURE DEPOLARIZATION: ICD-10-CM

## 2021-10-07 PROCEDURE — 93000 ELECTROCARDIOGRAM COMPLETE: CPT

## 2021-10-07 PROCEDURE — 99214 OFFICE O/P EST MOD 30 MIN: CPT

## 2021-10-07 RX ORDER — FAMOTIDINE 20 MG/1
20 TABLET ORAL DAILY
Refills: 0 | Status: ACTIVE | COMMUNITY

## 2021-10-07 RX ORDER — VITAMIN B COMPLEX
CAPSULE ORAL DAILY
Refills: 0 | Status: DISCONTINUED | COMMUNITY
End: 2021-10-07

## 2021-10-07 RX ORDER — MULTIVITAMIN
CAPSULE ORAL DAILY
Refills: 0 | Status: DISCONTINUED | COMMUNITY
End: 2021-10-07

## 2021-10-07 RX ORDER — BUDESONIDE AND FORMOTEROL FUMARATE DIHYDRATE 160; 4.5 UG/1; UG/1
160-4.5 AEROSOL RESPIRATORY (INHALATION) TWICE DAILY
Qty: 1 | Refills: 1 | Status: ACTIVE | COMMUNITY

## 2022-01-06 ENCOUNTER — APPOINTMENT (OUTPATIENT)
Dept: THORACIC SURGERY | Facility: CLINIC | Age: 77
End: 2022-01-06

## 2022-01-11 ENCOUNTER — APPOINTMENT (OUTPATIENT)
Dept: UROLOGY | Facility: CLINIC | Age: 77
End: 2022-01-11

## 2022-01-18 ENCOUNTER — NON-APPOINTMENT (OUTPATIENT)
Age: 77
End: 2022-01-18

## 2022-02-03 ENCOUNTER — APPOINTMENT (OUTPATIENT)
Dept: THORACIC SURGERY | Facility: CLINIC | Age: 77
End: 2022-02-03
Payer: MEDICARE

## 2022-02-03 VITALS
SYSTOLIC BLOOD PRESSURE: 137 MMHG | HEIGHT: 67 IN | DIASTOLIC BLOOD PRESSURE: 75 MMHG | OXYGEN SATURATION: 98 % | HEART RATE: 67 BPM | WEIGHT: 170 LBS | BODY MASS INDEX: 26.68 KG/M2 | RESPIRATION RATE: 16 BRPM

## 2022-02-03 PROCEDURE — 99214 OFFICE O/P EST MOD 30 MIN: CPT

## 2022-02-03 NOTE — PHYSICAL EXAM
[] : no respiratory distress [Respiration, Rhythm And Depth] : normal respiratory rhythm and effort [Exaggerated Use Of Accessory Muscles For Inspiration] : no accessory muscle use [Auscultation Breath Sounds / Voice Sounds] : lungs were clear to auscultation bilaterally [Examination Of The Chest] : the chest was normal in appearance [Chest Visual Inspection Thoracic Asymmetry] : no chest asymmetry [Diminished Respiratory Excursion] : normal chest expansion [2+] : left 2+ [Breast Appearance] : normal in appearance [Breast Palpation Mass] : no palpable masses [Bowel Sounds] : normal bowel sounds [Abdomen Soft] : soft [Abdomen Tenderness] : non-tender [Cervical Lymph Nodes Enlarged Posterior Bilaterally] : posterior cervical [Cervical Lymph Nodes Enlarged Anterior Bilaterally] : anterior cervical [Supraclavicular Lymph Nodes Enlarged Bilaterally] : supraclavicular [No CVA Tenderness] : no ~M costovertebral angle tenderness [No Spinal Tenderness] : no spinal tenderness [Skin Color & Pigmentation] : normal skin color and pigmentation [Oriented To Time, Place, And Person] : oriented to person, place, and time

## 2022-02-04 NOTE — ASSESSMENT
[FreeTextEntry1] : Mr. ALEJA JUÁREZ, 76 year old male, former smoker, w/ hx of HTN, DM2, LEILA on CPAP, COPD, skin CA, Esophageal CA s/p induction chemo then surgery in 2008, no chemo after surgery, who presented today to re-establish care. \par \par Now 13 years s/p EGD, Laparotomy, ALND, feeding jejunostomy tube placement, Rt Thoracotomy en bloc esophagectomy, MLND on 12/2/2008. Path revealed invasive AdenoCA of esophagus, margins and LNs negative, ypT1N0 Stg I.\par \par I have reviewed the patient's medical records and diagnostic images at time of this office consultation and have made the following recommendation:\par 1. CT scan with stable findings. Recommendation to return to clinic in 1 year with CT Chest, no contrast, to re-evaluate stability. \par \par Recommendations reviewed with patient during this office visit, and all questions answered; Patient instructed on the importance of follow up and verbalizes understanding.\par \par I personally performed the services described in the documentation, reviewed the documentation recorded by the scribe in my presence and it accurately and completely records my words and actions.\par \par I, Daniela Javier, ANP-C, am scribing for and the presence of FLORES Neil, the following sections HISTORY OF PRESENT ILLNESS, PAST MEDICAL/FAMILY/SOCIAL HISTORY; REVIEW OF SYSTEMS; VITAL SIGNS; PHYSICAL EXAM; DISPOSITION.\par \par \par \par

## 2022-02-04 NOTE — CONSULT LETTER
[FreeTextEntry2] : Dr. Jazmin Garrett MD (PCP)\par Dr. Raya (GI)\par Dr. Mason (Pulm) \par Dr. Baker (Cardiologist) [FreeTextEntry3] : Juventino Gillis MD, MPH \par System Director of Thoracic Surgery \par Director of Comprehensive Lung and [facility] \par Professor Cardiovascular & Thoracic Surgery  \par [facility] [employer] [facility]\par \par [facility]\par [address]\par Oncology [address]\[place] [address]\par Tel: [phone]\par Fax: [phone]\par

## 2022-02-04 NOTE — HISTORY OF PRESENT ILLNESS
[FreeTextEntry1] : Mr. ALEJA JUÁREZ, 76 year old male, former smoker, w/ hx of HTN, DM2, LEILA on CPAP, COPD, skin CA, Esophageal CA s/p induction chemo then surgery in 2008, no chemo after surgery, who presented today to re-establish care. \par \par Patient was last seen on 11/14/2017 and I recommended patient to RTC in 1 year, no f/u since then. \par \par Now 13 years s/p EGD, Laparotomy, ALND, feeding jejunostomy tube placement, Rt Thoracotomy en bloc esophagectomy, MLND on 12/2/2008. Path revealed invasive AdenoCA of esophagus, margins and LNs negative, ypT1N0 Stg I.\par \par CT chest on 12/11/2020:\par - upper abdominal and esophageal post-op changes\par - two 3 mm RLL nodule, not definitely visualized on the prior study\par - 2 cm Right upper pole renal cyst, previously measured 1.3 cm. \par \par Of note, EGD Feb 2020 by Dr. Raya was "normal" per patient.\par \par CT Chest on 1/27/22:\par - Postsurgical changes with chronic emphysematous and interstitial changes of the lungs\par - 1.7 cm renal mass; Stones in Gallbladder\par \par Pt presents today for follow up.Patient denies worsening SOB, chest pain, cough, hemoptysis, fever, chills, night sweats, lightheadedness or dizziness.\par \par \par

## 2022-04-14 ENCOUNTER — APPOINTMENT (OUTPATIENT)
Dept: PULMONOLOGY | Facility: CLINIC | Age: 77
End: 2022-04-14
Payer: MEDICARE

## 2022-04-14 VITALS
DIASTOLIC BLOOD PRESSURE: 72 MMHG | BODY MASS INDEX: 26.68 KG/M2 | SYSTOLIC BLOOD PRESSURE: 142 MMHG | RESPIRATION RATE: 16 BRPM | WEIGHT: 170 LBS | OXYGEN SATURATION: 97 % | HEART RATE: 61 BPM | HEIGHT: 67 IN

## 2022-04-14 PROCEDURE — 99214 OFFICE O/P EST MOD 30 MIN: CPT

## 2022-04-14 RX ORDER — CLOPIDOGREL BISULFATE 75 MG/1
75 TABLET, FILM COATED ORAL
Qty: 90 | Refills: 0 | Status: ACTIVE | COMMUNITY
Start: 2021-03-08

## 2022-04-14 RX ORDER — RAMIPRIL 10 MG/1
10 CAPSULE ORAL DAILY
Qty: 180 | Refills: 3 | Status: ACTIVE | COMMUNITY

## 2022-04-14 NOTE — HISTORY OF PRESENT ILLNESS
[TextBox_4] : He has been complaining of increasing dyspnea on exertion over the past several months. He had COVID 19 in January but was not hospitalized although he did feel ill. He had a cough at that time treated with narcotics.\par \par Compliance with CPAP remains excellent.

## 2022-04-14 NOTE — PHYSICAL EXAM
[No Resp Distress] : no resp distress [Clear to Auscultation Bilaterally] : clear to auscultation bilaterally [TextBox_68] : diminished breath sounds bilaterally

## 2022-04-14 NOTE — ASSESSMENT
[FreeTextEntry1] : Probably chronic worsening of COPD over the years. I'm switching him from Symbicort to Trelegy 100. Followup pulmonary function studies will be done in about 6 weeks and I will see him after that. CPAP will be continued.

## 2022-05-16 ENCOUNTER — APPOINTMENT (OUTPATIENT)
Dept: CARDIOLOGY | Facility: CLINIC | Age: 77
End: 2022-05-16
Payer: MEDICARE

## 2022-05-16 ENCOUNTER — NON-APPOINTMENT (OUTPATIENT)
Age: 77
End: 2022-05-16

## 2022-05-16 VITALS
WEIGHT: 171 LBS | SYSTOLIC BLOOD PRESSURE: 134 MMHG | DIASTOLIC BLOOD PRESSURE: 66 MMHG | HEIGHT: 67 IN | BODY MASS INDEX: 26.84 KG/M2 | TEMPERATURE: 98.3 F | OXYGEN SATURATION: 96 % | HEART RATE: 59 BPM

## 2022-05-16 DIAGNOSIS — R94.31 ABNORMAL ELECTROCARDIOGRAM [ECG] [EKG]: ICD-10-CM

## 2022-05-16 PROCEDURE — 99214 OFFICE O/P EST MOD 30 MIN: CPT

## 2022-05-16 PROCEDURE — 93000 ELECTROCARDIOGRAM COMPLETE: CPT

## 2022-05-16 RX ORDER — TAMSULOSIN HYDROCHLORIDE 0.4 MG/1
0.4 CAPSULE ORAL DAILY
Refills: 0 | Status: ACTIVE | COMMUNITY
Start: 2016-05-18

## 2022-05-16 RX ORDER — ROSUVASTATIN CALCIUM 20 MG/1
20 TABLET, FILM COATED ORAL DAILY
Refills: 0 | Status: DISCONTINUED | COMMUNITY
End: 2022-05-16

## 2022-05-16 RX ORDER — BLOOD SUGAR DIAGNOSTIC
STRIP MISCELLANEOUS
Qty: 100 | Refills: 0 | Status: DISCONTINUED | COMMUNITY
Start: 2020-11-30 | End: 2022-05-16

## 2022-05-16 RX ORDER — PANTOPRAZOLE 40 MG/1
40 TABLET, DELAYED RELEASE ORAL TWICE DAILY
Refills: 0 | Status: ACTIVE | COMMUNITY

## 2022-05-16 RX ORDER — ATORVASTATIN CALCIUM 40 MG/1
40 TABLET, FILM COATED ORAL
Qty: 90 | Refills: 1 | Status: ACTIVE | COMMUNITY
Start: 2021-03-08

## 2022-05-16 RX ORDER — ROSUVASTATIN CALCIUM 20 MG/1
20 TABLET, FILM COATED ORAL
Refills: 0 | Status: DISCONTINUED | COMMUNITY

## 2022-05-16 RX ORDER — AMLODIPINE BESYLATE 5 MG/1
5 TABLET ORAL DAILY
Qty: 90 | Refills: 2 | Status: DISCONTINUED | COMMUNITY
Start: 2021-10-07 | End: 2022-05-16

## 2022-05-18 PROBLEM — R94.31 ABNORMAL EKG: Status: ACTIVE | Noted: 2017-08-10

## 2022-05-18 NOTE — HISTORY OF PRESENT ILLNESS
[FreeTextEntry1] : 76 year old male with a history of esophageal cancer now in remission, hypertension, hyperlipidemia, carotid artery disease (last carotid duplex 01/2021 with moderate atherosclerosis on the right and mild atherosclerosis on the left), DM, LEILA, COPD (former smoker), CAD s/p [facility] 2/16/21 with ELLIOTT x3 to proximal and mid RCA , medical management for circumflex disease.\par Trying to walk more. Gainedweight\par does not smoke\par No cp with activity but gets short of breath, followed by pulmonology. \par No palpitation, syncope or presyncope.\par \par EKG: NSR, bradycardia, nonspecific T wave changes\par 
15 Hour(s) 58 Minute(s)

## 2022-05-18 NOTE — ASSESSMENT
[FreeTextEntry1] : Status post PCI to RCA February 2021\par EKG with T wave inversions in the inferior and lateral leads unchanged compared to prior EKGs\par Denies having any chest pain\par .  Is active and tries to walk every night about a mile.\par Feels shortness of breath with exertion.  Patient has emphysema and interstitial changes on CAT scan\par Repeat echocardiogram to exclude significant regional wall motion abnormality or valvular heart disease\par No need for stress test at this time since no new EKG changes or cp.\par BP is to goal.\par Labs reviewed LDL to goal.  No changes in medication at this time.\par Follow-up with me in 6 months.\par Patient was advised to contact the office or seek medical care for any new or concerning symptoms right away.  Patient verbalized understanding and is in agreement with the above plan.\par \par \par

## 2022-05-18 NOTE — PHYSICAL EXAM
[Well Developed] : well developed [Well Nourished] : well nourished [Normal Conjunctiva] : normal conjunctiva [Normal Venous Pressure] : normal venous pressure [No Carotid Bruit] : no carotid bruit [Normal S1, S2] : normal S1, S2 [Normal] : soft, non-tender, no masses/organomegaly, normal bowel sounds [No Edema] : no edema [Moves all extremities] : moves all extremities [Alert and Oriented] : alert and oriented [de-identified] : 3/6 sm lsb, no edema [de-identified] : no wheeze, cta

## 2022-05-18 NOTE — REVIEW OF SYSTEMS
[Weight Gain (___ Lbs)] : [unfilled] ~Ulb weight gain [Feeling Fatigued] : not feeling fatigued [Blurry Vision] : no blurred vision [Seeing Double (Diplopia)] : no diplopia [Earache] : no earache [Discharge From Ears] : no discharge from the ears [Dyspnea on exertion] : dyspnea during exertion [Palpitations] : no palpitations [Orthopnea] : no orthopnea [Cough] : cough [Wheezing] : no wheezing [Abdominal Pain] : no abdominal pain [Nausea] : no nausea [Change in Appetite] : no change in appetite [Hematuria] : no hematuria [Joint Pain] : joint pain [Confusion] : no confusion was observed

## 2022-05-24 ENCOUNTER — EMERGENCY (EMERGENCY)
Facility: HOSPITAL | Age: 77
LOS: 1 days | Discharge: HOME | End: 2022-05-24
Attending: EMERGENCY MEDICINE
Payer: COMMERCIAL

## 2022-05-24 VITALS
RESPIRATION RATE: 20 BRPM | HEART RATE: 80 BPM | SYSTOLIC BLOOD PRESSURE: 159 MMHG | WEIGHT: 169.98 LBS | OXYGEN SATURATION: 97 % | DIASTOLIC BLOOD PRESSURE: 80 MMHG | TEMPERATURE: 98 F | HEIGHT: 69 IN

## 2022-05-24 VITALS
TEMPERATURE: 99 F | RESPIRATION RATE: 20 BRPM | DIASTOLIC BLOOD PRESSURE: 74 MMHG | SYSTOLIC BLOOD PRESSURE: 154 MMHG | OXYGEN SATURATION: 98 % | HEART RATE: 63 BPM

## 2022-05-24 DIAGNOSIS — Z98.890 OTHER SPECIFIED POSTPROCEDURAL STATES: Chronic | ICD-10-CM

## 2022-05-24 DIAGNOSIS — Z98.89 OTHER SPECIFIED POSTPROCEDURAL STATES: Chronic | ICD-10-CM

## 2022-05-24 LAB
ALBUMIN SERPL ELPH-MCNC: 4.1 G/DL — SIGNIFICANT CHANGE UP (ref 3.3–5.2)
ALP SERPL-CCNC: 101 U/L — SIGNIFICANT CHANGE UP (ref 40–120)
ALT FLD-CCNC: 21 U/L — SIGNIFICANT CHANGE UP
ANION GAP SERPL CALC-SCNC: 13 MMOL/L — SIGNIFICANT CHANGE UP (ref 5–17)
APTT BLD: 24.2 SEC — LOW (ref 27.5–35.5)
AST SERPL-CCNC: 23 U/L — SIGNIFICANT CHANGE UP
BASOPHILS # BLD AUTO: 0.03 K/UL — SIGNIFICANT CHANGE UP (ref 0–0.2)
BASOPHILS NFR BLD AUTO: 0.3 % — SIGNIFICANT CHANGE UP (ref 0–2)
BILIRUB SERPL-MCNC: 0.5 MG/DL — SIGNIFICANT CHANGE UP (ref 0.4–2)
BLD GP AB SCN SERPL QL: SIGNIFICANT CHANGE UP
BUN SERPL-MCNC: 16.6 MG/DL — SIGNIFICANT CHANGE UP (ref 8–20)
CALCIUM SERPL-MCNC: 9.1 MG/DL — SIGNIFICANT CHANGE UP (ref 8.6–10.2)
CHLORIDE SERPL-SCNC: 102 MMOL/L — SIGNIFICANT CHANGE UP (ref 98–107)
CO2 SERPL-SCNC: 24 MMOL/L — SIGNIFICANT CHANGE UP (ref 22–29)
CREAT SERPL-MCNC: 0.96 MG/DL — SIGNIFICANT CHANGE UP (ref 0.5–1.3)
EGFR: 82 ML/MIN/1.73M2 — SIGNIFICANT CHANGE UP
EOSINOPHIL # BLD AUTO: 0.03 K/UL — SIGNIFICANT CHANGE UP (ref 0–0.5)
EOSINOPHIL NFR BLD AUTO: 0.3 % — SIGNIFICANT CHANGE UP (ref 0–6)
GLUCOSE SERPL-MCNC: 177 MG/DL — HIGH (ref 70–99)
HCT VFR BLD CALC: 43.4 % — SIGNIFICANT CHANGE UP (ref 39–50)
HGB BLD-MCNC: 14.8 G/DL — SIGNIFICANT CHANGE UP (ref 13–17)
IMM GRANULOCYTES NFR BLD AUTO: 0.4 % — SIGNIFICANT CHANGE UP (ref 0–1.5)
INR BLD: 0.97 RATIO — SIGNIFICANT CHANGE UP (ref 0.88–1.16)
LIDOCAIN IGE QN: 17 U/L — LOW (ref 22–51)
LYMPHOCYTES # BLD AUTO: 0.71 K/UL — LOW (ref 1–3.3)
LYMPHOCYTES # BLD AUTO: 6.1 % — LOW (ref 13–44)
MCHC RBC-ENTMCNC: 31.7 PG — SIGNIFICANT CHANGE UP (ref 27–34)
MCHC RBC-ENTMCNC: 34.1 GM/DL — SIGNIFICANT CHANGE UP (ref 32–36)
MCV RBC AUTO: 92.9 FL — SIGNIFICANT CHANGE UP (ref 80–100)
MONOCYTES # BLD AUTO: 0.87 K/UL — SIGNIFICANT CHANGE UP (ref 0–0.9)
MONOCYTES NFR BLD AUTO: 7.5 % — SIGNIFICANT CHANGE UP (ref 2–14)
NEUTROPHILS # BLD AUTO: 9.91 K/UL — HIGH (ref 1.8–7.4)
NEUTROPHILS NFR BLD AUTO: 85.4 % — HIGH (ref 43–77)
PLATELET # BLD AUTO: 219 K/UL — SIGNIFICANT CHANGE UP (ref 150–400)
POTASSIUM SERPL-MCNC: 4.2 MMOL/L — SIGNIFICANT CHANGE UP (ref 3.5–5.3)
POTASSIUM SERPL-SCNC: 4.2 MMOL/L — SIGNIFICANT CHANGE UP (ref 3.5–5.3)
PROT SERPL-MCNC: 7.1 G/DL — SIGNIFICANT CHANGE UP (ref 6.6–8.7)
PROTHROM AB SERPL-ACNC: 11.2 SEC — SIGNIFICANT CHANGE UP (ref 10.5–13.4)
RBC # BLD: 4.67 M/UL — SIGNIFICANT CHANGE UP (ref 4.2–5.8)
RBC # FLD: 13.2 % — SIGNIFICANT CHANGE UP (ref 10.3–14.5)
SODIUM SERPL-SCNC: 139 MMOL/L — SIGNIFICANT CHANGE UP (ref 135–145)
TROPONIN T SERPL-MCNC: <0.01 NG/ML — SIGNIFICANT CHANGE UP (ref 0–0.06)
WBC # BLD: 11.6 K/UL — HIGH (ref 3.8–10.5)
WBC # FLD AUTO: 11.6 K/UL — HIGH (ref 3.8–10.5)

## 2022-05-24 PROCEDURE — 71260 CT THORAX DX C+: CPT | Mod: MA

## 2022-05-24 PROCEDURE — 93010 ELECTROCARDIOGRAM REPORT: CPT

## 2022-05-24 PROCEDURE — 85610 PROTHROMBIN TIME: CPT

## 2022-05-24 PROCEDURE — 99285 EMERGENCY DEPT VISIT HI MDM: CPT | Mod: 25

## 2022-05-24 PROCEDURE — 96375 TX/PRO/DX INJ NEW DRUG ADDON: CPT | Mod: XU

## 2022-05-24 PROCEDURE — 36415 COLL VENOUS BLD VENIPUNCTURE: CPT

## 2022-05-24 PROCEDURE — 80053 COMPREHEN METABOLIC PANEL: CPT

## 2022-05-24 PROCEDURE — 83690 ASSAY OF LIPASE: CPT

## 2022-05-24 PROCEDURE — 71045 X-RAY EXAM CHEST 1 VIEW: CPT

## 2022-05-24 PROCEDURE — 93005 ELECTROCARDIOGRAM TRACING: CPT

## 2022-05-24 PROCEDURE — 99285 EMERGENCY DEPT VISIT HI MDM: CPT

## 2022-05-24 PROCEDURE — 85730 THROMBOPLASTIN TIME PARTIAL: CPT

## 2022-05-24 PROCEDURE — 71045 X-RAY EXAM CHEST 1 VIEW: CPT | Mod: 26

## 2022-05-24 PROCEDURE — 86901 BLOOD TYPING SEROLOGIC RH(D): CPT

## 2022-05-24 PROCEDURE — 86850 RBC ANTIBODY SCREEN: CPT

## 2022-05-24 PROCEDURE — 85025 COMPLETE CBC W/AUTO DIFF WBC: CPT

## 2022-05-24 PROCEDURE — 96374 THER/PROPH/DIAG INJ IV PUSH: CPT | Mod: XU

## 2022-05-24 PROCEDURE — 86900 BLOOD TYPING SEROLOGIC ABO: CPT

## 2022-05-24 PROCEDURE — 84484 ASSAY OF TROPONIN QUANT: CPT

## 2022-05-24 PROCEDURE — 71260 CT THORAX DX C+: CPT | Mod: 26,MA

## 2022-05-24 RX ORDER — PANTOPRAZOLE SODIUM 20 MG/1
40 TABLET, DELAYED RELEASE ORAL ONCE
Refills: 0 | Status: COMPLETED | OUTPATIENT
Start: 2022-05-24 | End: 2022-05-24

## 2022-05-24 RX ORDER — MORPHINE SULFATE 50 MG/1
6 CAPSULE, EXTENDED RELEASE ORAL ONCE
Refills: 0 | Status: DISCONTINUED | OUTPATIENT
Start: 2022-05-24 | End: 2022-05-24

## 2022-05-24 RX ORDER — FAMOTIDINE 10 MG/ML
20 INJECTION INTRAVENOUS ONCE
Refills: 0 | Status: COMPLETED | OUTPATIENT
Start: 2022-05-24 | End: 2022-05-24

## 2022-05-24 RX ORDER — SODIUM CHLORIDE 9 MG/ML
1000 INJECTION INTRAMUSCULAR; INTRAVENOUS; SUBCUTANEOUS ONCE
Refills: 0 | Status: COMPLETED | OUTPATIENT
Start: 2022-05-24 | End: 2022-05-24

## 2022-05-24 RX ADMIN — PANTOPRAZOLE SODIUM 40 MILLIGRAM(S): 20 TABLET, DELAYED RELEASE ORAL at 08:32

## 2022-05-24 RX ADMIN — FAMOTIDINE 20 MILLIGRAM(S): 10 INJECTION INTRAVENOUS at 08:32

## 2022-05-24 RX ADMIN — MORPHINE SULFATE 6 MILLIGRAM(S): 50 CAPSULE, EXTENDED RELEASE ORAL at 08:32

## 2022-05-24 RX ADMIN — SODIUM CHLORIDE 1000 MILLILITER(S): 9 INJECTION INTRAMUSCULAR; INTRAVENOUS; SUBCUTANEOUS at 08:32

## 2022-05-24 NOTE — ED ADULT TRIAGE NOTE - CHIEF COMPLAINT QUOTE
patient BIBA for c/o nonradiating chest pain since 02:00. per patient vomiting "dark" 10 x since. hx of esophageal CA. patient BIBA for c/o nonradiating chest pain since 02:00. per patient vomiting "dark" 10 x since, received zofran PO by EMS and no vomiting since. hx of esophageal CA.

## 2022-05-24 NOTE — ED PROVIDER NOTE - NSICDXPASTSURGICALHX_GEN_ALL_CORE_FT
PAST SURGICAL HISTORY:  History of esophagectomy     S/P coil embolization of cerebral aneurysm     S/P colectomy Right Hemicolectomy secondary to polyps.    S/P colonoscopy     S/P hernia surgery Umblical Hernia Repair

## 2022-05-24 NOTE — ED PROVIDER NOTE - NS ED ROS FT
General: Denies fever, chills  HEENT: Denies sore throat  Neck: Denies neck pain  Resp: + SOB  Cardiovascular: + CP  GI: Denies abdominal pain, nausea, vomiting  : Denies dysuria, incontinence  MSK: Denies back pain  Neuro: Denies HA, dizziness  Skin: Denies rashes

## 2022-05-24 NOTE — ED ADULT NURSE NOTE - CHIEF COMPLAINT QUOTE
patient BIBA for c/o nonradiating chest pain since 02:00. per patient vomiting "dark" 10 x since, received zofran PO by EMS and no vomiting since. hx of esophageal CA.

## 2022-05-24 NOTE — ED ADULT NURSE NOTE - OBJECTIVE STATEMENT
Patient woke up this morning feeling midsternal chest pain and a heaviness in chest. Episode(s) vomiting at home. Arrived awake, AOX3, ambulatory from EMS stretcher to ED stretcher steady and unassisted. Placed on continuous cardiac monitoring.

## 2022-05-24 NOTE — ED PROVIDER NOTE - PHYSICAL EXAMINATION
General: Well appearing male in no acute distress  HEENT: Normocephalic, atraumatic. Moist mucous membranes.  Eyes: No scleral icterus. No conjunctival pallor. EOMI.   Neck: Soft and supple. Full ROM without pain.   Cardiac: Regular rate and regular rhythm. No murmurs.  Resp: Lungs CTAB. No wheezes, rales or rhonchi.  Abd: Soft, non-tender, non-distended. No guarding or rebound.  Back: Spine midline  Skin: No rashes, abrasions, or lacerations.  Neuro: AO x 3. Motor strength and sensation grossly intact.

## 2022-05-24 NOTE — ED PROVIDER NOTE - CARE PROVIDER_API CALL
Darwin Arredondo (MD)  Surgery  [address] [facility] [address] [place] [address]  Phone: [phone]  Fax: [phone]  Follow Up Time: Urgent

## 2022-05-24 NOTE — ED PROVIDER NOTE - NSFOLLOWUPINSTRUCTIONS_ED_ALL_ED_FT
Chest Pain    WHAT YOU NEED TO KNOW:    Chest pain can be caused by a range of conditions, from not serious to life-threatening. Chest pain can be a symptom of a digestive problem, such as acid reflux or a stomach ulcer. An anxiety attack or a strong emotion, such as anger, can also cause chest pain. Infection, inflammation, or a fracture in the bones or cartilage in your chest can cause pain or discomfort. Sometimes chest pain or pressure is caused by poor blood flow to your heart (angina). Chest pain may also be caused by life-threatening conditions such as a heart attack or blood clot in your lungs.    DISCHARGE INSTRUCTIONS:    Call your local emergency number (911 in the [place]) or have someone call if:   •You have any of the following signs of a heart attack: ?Squeezing, pressure, or pain in your chest      ?You may also have any of the following: ?Discomfort or pain in your back, neck, jaw, stomach, or arm      ?Shortness of breath      ?Nausea or vomiting      ?Lightheadedness or a sudden cold sweat            Return to the emergency department if:   •You have chest discomfort that gets worse, even with medicine.      •You cough or vomit blood.      •Your bowel movements are black or bloody.      •You cannot stop vomiting, or it hurts to swallow.      Call your doctor if:   •You have questions or concerns about your condition or care.          Medicines:   •Medicines may be given to treat the cause of your chest pain. Examples include pain medicine, anxiety medicine, or medicines to increase blood flow to your heart.      •Do not take certain medicines without asking your healthcare provider first. These include NSAIDs, herbal or vitamin supplements, and hormones, such as estrogen or progestin.      •Take your medicine as directed. Contact your healthcare provider if you think your medicine is not helping or if you have side effects. Tell him or her if you are allergic to any medicine. Keep a list of the medicines, vitamins, and herbs you take. Include the amounts, and when and why you take them. Bring the list or the pill bottles to follow-up visits. Carry your medicine list with you in case of an emergency.      Healthy living tips: If the cause of your chest pain is known, your healthcare provider will give you specific guidelines to follow. The following are general healthy guidelines:  •Do not smoke. Nicotine and other chemicals in cigarettes and cigars can cause lung and heart damage. Ask your healthcare provider for information if you currently smoke and need help to quit. E-cigarettes or smokeless tobacco still contain nicotine. Talk to your healthcare provider before you use these products.      •Choose a variety of healthy foods as often as possible. Include fresh, frozen, or canned fruits and vegetables. Also include low-fat dairy products, fish, chicken (without skin), and lean meats. Your healthcare provider or a dietitian can help you create meal plans. You may need to avoid certain foods or drinks if your pain is caused by a digestion problem.  Healthy Foods           •Lower your sodium (salt) intake. Limit foods that are high in sodium, such as canned foods, salty snacks, and cold cuts. If you add salt when you cook food, do not add more at the table. Choose low-sodium canned foods as much as possible.             •Drink plenty of water every day. Water helps your body to control your temperature and blood pressure. Ask your healthcare provider how much water you should drink every day.      •Ask about activity. Your healthcare provider will tell you which activities to limit or avoid. Ask when you can drive, return to work, and have sex. Ask about the best exercise plan for you.      •Maintain a healthy weight. Ask your healthcare provider what a healthy weight is for you. Ask him or her to help you create a safe weight loss plan if you are overweight.      •Ask about vaccines you may need. Your healthcare provider can tell you which vaccines you need, and when to get them. The following vaccines help prevent diseases that can become serious for a person with a heart condition:?The influenza (flu) vaccine is given each year. Get a flu vaccine as soon as recommended, usually in September or October.      ?The pneumonia vaccine is usually given every 5 years. Your healthcare provider may recommend the pneumonia vaccine if you are 65 or older.      ?COVID-19 vaccines are given to adults as a shot in 1 or 2 doses.   Vaccination is recommended for all adults. A booster (additional) dose is also recommended for all adults. A booster helps your immune system continue to protect against severe COVID-19. The booster can be a different brand of the COVID-19 vaccine than you originally received. The timing for the booster depends on the type of vaccine you received:?1-dose vaccine: The booster is given at least 2 months after you received the vaccine.      ?2-dose vaccine: The booster is given at least 5 to 6 months after the second dose. A second booster is recommended for all adults 50 or older and for immunocompromised adults 18 or older. Your healthcare provider will tell you when to get this booster.        Prevent Heart Disease          Follow up with your doctor within 72 hours, or as directed: You may need to return for more tests to find the cause of your chest pain. You may be referred to a specialist, such as a cardiologist or gastroenterologist. Write down your questions so you remember to ask them during your visits.      Gallstones    WHAT YOU NEED TO KNOW:    Gallstones are hard substances that form in your gallbladder or bile duct. Your gallbladder and bile duct are located on the right side of your abdomen, near your liver. Your gallbladder stores bile. Bile helps break down the fat that you eat. Your gallbladder also helps remove certain chemicals from your body.  Gallstones         DISCHARGE INSTRUCTIONS:    Return to the emergency department if:   •You have a fever and chills.      •Your skin or eyes turn yellow.      •You have severe pain in your upper abdomen, just below the right ribcage.      Call your doctor or gastroenterologist if:   •You have nausea and are vomiting.      •Your urine is dark.      •You have isabell-colored bowel movements.      •You have questions or concerns about your condition or care.      Medicines:   •Prescription pain medicine may be given. Ask your healthcare provider how to take this medicine safely. Some prescription pain medicines contain acetaminophen. Do not take other medicines that contain acetaminophen without talking to your healthcare provider. Too much acetaminophen may cause liver damage. Prescription pain medicine may cause constipation. Ask your healthcare provider how to prevent or treat constipation.       •Take your medicine as directed. Contact your healthcare provider if you think your medicine is not helping or if you have side effects. Tell him or her if you are allergic to any medicine. Keep a list of the medicines, vitamins, and herbs you take. Include the amounts, and when and why you take them. Bring the list or the pill bottles to follow-up visits. Carry your medicine list with you in case of an emergency.      What you can do to manage or prevent gallstones:   •Eat a variety of healthy foods. This may help you have more energy and heal faster. Healthy foods include fruits, vegetables, whole-grain breads, low-fat dairy products, beans, lean meat, and fish. Ask if you need to be on a special diet. Try to eat regular meals during the day. This will help your gallbladder empty.  Healthy Foods           •Exercise as directed. Talk to your healthcare provider about the best exercise plan for you. Exercise can help you lose weight and improve your health.   Family Walking for Exercise           •Manage your weight. If you are overweight, it is important to reach a healthy weight. You will need to lose weight slowly because rapid weight loss can increase your risk for gallstones. Talk to your healthcare provider about your weight. He or she can help you create a safe weight loss plan if you need to lose weight.      Follow up with your doctor or gastroenterologist as directed: Write down your questions so you remember to ask them during your visits.

## 2022-05-24 NOTE — ED PROVIDER NOTE - PROGRESS NOTE DETAILS
[place]: Patient notes improvement in pain with pain medication. Results shared, pending CT. Rohan: Patient reassessed, shared incidental findings including granuloma and lymphnode as well as gallstones, all of which patient and wife are aware of. Patient states he ate steak last night prior to symptoms starting - I discussed diet modification and indications to return. Patient is comfortable with discharge home.

## 2022-05-24 NOTE — ED PROVIDER NOTE - ATTENDING CONTRIBUTION TO CARE
I personally saw the patient with the resident, and completed the key components of the history and physical exam. I then discussed the management plan with the resident.    75 y/o M with PMH DM, HTN, CAD with 3 stents, GERD, esophageal cancer s/p resection (2009 or 2013) presents for chest pain with 10 episodes of intractable vomiting since last night that was dark slimy brownish, no coffee grounds - pain is worse with deep inspiration. The chest pain is non-exertional and non-radiating, patient does not take blood thinners. He was treated with 324 ASA and zofran by EMS with improvement in nausea, but not in pain. Cards - [place].    I agree with exam as documented.    labs, pain control, EKG nonischemic, CT, reassess.

## 2022-05-24 NOTE — ED PROVIDER NOTE - OBJECTIVE STATEMENT
Patient is a 75yo M presenting with chest pain from home. He reports that the symptoms started at 2am and woke him from sleep. He notes a pressure in the middle of his chest Patient is a 77yo M presenting with chest pain from home. He reports that the symptoms started at 2am and woke him from sleep. He notes a pressure in the middle of his chest. He states that he has a history of esophageal cancer with resection. Patient reports that he has seen a cardiologist in the past and has a history of HTN. He reports that the pain is worse with deep breathing. Denies fevers, chills, nausea, vomiting, diarrhea Patient is a 75yo M presenting with chest pain from home. He reports that the symptoms started at 2am and woke him from sleep. He notes a pressure in the middle of his chest. He states that he has a history of esophageal cancer with resection. Patient reports that he has seen a cardiologist in the past and has a history of HTN. He reports that the pain is worse with deep breathing. Denies fevers, chills, diarrhea

## 2022-05-24 NOTE — ED PROVIDER NOTE - NSICDXFAMILYHX_GEN_ALL_CORE_FT
FAMILY HISTORY:  Mother  Still living? Unknown  Family history of coronary artery disease, Age at diagnosis: Age Unknown  Family history of hypertension, Age at diagnosis: Age Unknown    Sibling  Still living? Unknown  Family history of cancer, Age at diagnosis: Age Unknown  Family history of coronary artery disease, Age at diagnosis: Age Unknown

## 2022-05-24 NOTE — ED PROVIDER NOTE - NSICDXPASTMEDICALHX_GEN_ALL_CORE_FT
PAST MEDICAL HISTORY:  BPH (benign prostatic hyperplasia)     Diabetes mellitus     Emphysema     Esophageal cancer     GERD (gastroesophageal reflux disease)     H/O carotid artery stenosis     High cholesterol     History of cerebral aneurysm     Hypertension     LEILA on CPAP     Pneumonia     Sleep apnea

## 2022-05-24 NOTE — ED PROVIDER NOTE - PATIENT PORTAL LINK FT
You can access the FollowMyHealth Patient Portal offered by [facility] by registering at the following website: [email or web address]. By joining [employer]’s FollowMyHealth portal, you will also be able to view your health information using other applications (apps) compatible with our system.

## 2022-05-24 NOTE — ED PROVIDER NOTE - CLINICAL SUMMARY MEDICAL DECISION MAKING FREE TEXT BOX
Patient with history of esophageal cancer presenting with chest pressure from home. Will evaluate with labwork, cxr, ct imaging.

## 2022-05-31 LAB — SARS-COV-2 N GENE NPH QL NAA+PROBE: NOT DETECTED

## 2022-06-02 ENCOUNTER — APPOINTMENT (OUTPATIENT)
Dept: PULMONOLOGY | Facility: CLINIC | Age: 77
End: 2022-06-02
Payer: MEDICARE

## 2022-06-02 VITALS
OXYGEN SATURATION: 98 % | BODY MASS INDEX: 25.76 KG/M2 | WEIGHT: 170 LBS | HEIGHT: 68 IN | HEART RATE: 57 BPM | DIASTOLIC BLOOD PRESSURE: 78 MMHG | SYSTOLIC BLOOD PRESSURE: 140 MMHG

## 2022-06-02 VITALS — WEIGHT: 170 LBS | BODY MASS INDEX: 27.32 KG/M2 | HEIGHT: 66 IN

## 2022-06-02 PROCEDURE — 94010 BREATHING CAPACITY TEST: CPT

## 2022-06-02 PROCEDURE — 85018 HEMOGLOBIN: CPT | Mod: QW

## 2022-06-02 PROCEDURE — 94727 GAS DIL/WSHOT DETER LNG VOL: CPT

## 2022-06-02 PROCEDURE — 99214 OFFICE O/P EST MOD 30 MIN: CPT | Mod: 25

## 2022-06-02 PROCEDURE — 94729 DIFFUSING CAPACITY: CPT

## 2022-06-02 RX ORDER — FLUTICASONE FUROATE, UMECLIDINIUM BROMIDE AND VILANTEROL TRIFENATATE 100; 62.5; 25 UG/1; UG/1; UG/1
100-62.5-25 POWDER RESPIRATORY (INHALATION)
Qty: 1 | Refills: 5 | Status: DISCONTINUED | COMMUNITY
Start: 2022-04-14 | End: 2022-06-02

## 2022-06-02 NOTE — ASSESSMENT
[FreeTextEntry1] : Severe obstructive sleep apnea with excellent compliance and benefit from CPAP. This will be continued.\par \par Patient has improved lung function over several years ago since going on Symbicort. This will be continued as well. He gets it through the [facility]. Followup in one year.

## 2022-06-02 NOTE — HISTORY OF PRESENT ILLNESS
[TextBox_4] : Patient was in the emergency room at [facility] last week with chest pain in the middle of the night. He ruled out for pulmonary embolism or cardiac disease. This probably represents reflux or biliary colic. He is following up with his primary physician. I reviewed the hospital records. The patient did not go on Trelegy because of the cost. [Obstructive Sleep Apnea] : obstructive sleep apnea [CPAP:] : CPAP [TextBox_100] : 1/15 [TextBox_108] : 43 [TextBox_131] : 11 [TextBox_127] : 3/22 [TextBox_129] : 4/22 [TextBox_133] : 90 [TextBox_137] : 73 [TextBox_141] : 5 [TextBox_143] : 50 [TextBox_147] : 0.2

## 2022-06-02 NOTE — PROCEDURE
[FreeTextEntry1] : Pulmonary function studies are normal with FEV1 100% predicted and % predicted. Normal diffusing capacity. No air trapping. Hemoglobin 12.7.

## 2022-06-07 ENCOUNTER — APPOINTMENT (OUTPATIENT)
Dept: CARDIOLOGY | Facility: CLINIC | Age: 77
End: 2022-06-07

## 2022-06-14 ENCOUNTER — APPOINTMENT (OUTPATIENT)
Dept: CARDIOLOGY | Facility: CLINIC | Age: 77
End: 2022-06-14
Payer: MEDICARE

## 2022-06-14 PROCEDURE — 93306 TTE W/DOPPLER COMPLETE: CPT

## 2022-06-24 ENCOUNTER — NON-APPOINTMENT (OUTPATIENT)
Age: 77
End: 2022-06-24

## 2022-07-07 DIAGNOSIS — T14.8XXA OTHER INJURY OF UNSPECIFIED BODY REGION, INITIAL ENCOUNTER: ICD-10-CM

## 2022-07-12 ENCOUNTER — LABORATORY RESULT (OUTPATIENT)
Age: 77
End: 2022-07-12

## 2022-07-21 ENCOUNTER — APPOINTMENT (OUTPATIENT)
Dept: CARDIOLOGY | Facility: CLINIC | Age: 77
End: 2022-07-21

## 2022-07-21 PROCEDURE — A9500: CPT

## 2022-07-21 PROCEDURE — 93015 CV STRESS TEST SUPVJ I&R: CPT

## 2022-07-21 PROCEDURE — 78452 HT MUSCLE IMAGE SPECT MULT: CPT

## 2022-08-15 NOTE — H&P PST ADULT - NSICDXPASTSURGICALHX_GEN_ALL_CORE_FT
PAST SURGICAL HISTORY:  H/O cardiac catheterization Pci to RCA    History of esophagectomy     S/P coil embolization of cerebral aneurysm     S/P colectomy Right Hemicolectomy secondary to polyps.    S/P colonoscopy     S/P hernia surgery Umblical Hernia Repair

## 2022-08-15 NOTE — H&P PST ADULT - NSICDXPASTMEDICALHX_GEN_ALL_CORE_FT
PAST MEDICAL HISTORY:  BPH (benign prostatic hyperplasia)     CAD (coronary artery disease)     Diabetes mellitus     Emphysema     Esophageal cancer     GERD (gastroesophageal reflux disease)     H/O carotid artery stenosis     High cholesterol     History of cerebral aneurysm     Hypertension     LEILA on CPAP     Pneumonia     Sleep apnea

## 2022-08-15 NOTE — H&P PST ADULT - ASSESSMENT
76 year old male h/o RCA stents with new drop in EF and abnormal stress test for LHC to assess coronary arteries. 76 year old male h/o RCA stents with new drop in EF and abnormal stress test for LHC to assess coronary arteries.      Risk Assessments:  ASA: 3  Mallampati:  Bleeding Risk:  Creatinine:  GFR:   76 year old male h/o RCA stents with new drop in EF and abnormal stress test for LHC to assess coronary arteries.      Risk Assessments:  ASA: 3  Mallampati: 2  Bleeding Risk: 1.1%  Creatinine: 0.96  GFR: 82    Plan   -C with Dr. Hayes   -ASA 81mg/Plavix 75mg po pre procedure   -250ml normal saline IV bolus OC ppx  -risks and benefits discussed with patient   -consent obtained by MD

## 2022-08-15 NOTE — H&P PST ADULT - HISTORY OF PRESENT ILLNESS
Department of Cardiology                                                                  [facility]/[place] [address] [facility]                                                            Telephone: [phone]. Fax:[phone]                                                                             Pre- Cardiac Procedure H + P      HPI:  76 year old male with h/o COPD, LEILA (uses CPAP), DE LA CRUZ, DM, CAD with PCI to p/m RCA (2/18/21) who had recent echo which revealed a decrease in EF form 60% to 45-50%.  Pt underwent a stress test which was positive for SOB and T wave inversions for LHC to assess coronary anatomy.       Symptoms:        Angina (Class):        Ischemic Symptoms: DE LA CRUZ    Heart Failure:        Systolic/Diastolic/Combined:        NYHA Class (within 2 weeks):     Assessment of LVEF:       EF: 45       Assessed by:        Date:     Prior Cardiac Interventions:    2/16/2021  EF 66%  p/m RCA stents  pCirc 70% stenosis    < from: Cardiac Cath Lab - Adult (02.16.21 @ 09:12) >  VENTRICLES: EF calculated by contrast ventriculography was 65 %.  CORONARY VESSELS: The coronary circulation is right dominant.  LM:   --  LM: The vessel was large sized. Angiography showed minor luminal  irregularities with no flow limiting lesions.  LAD:   --  LAD: The vessel was large sized.  --  Mid LAD: There was a 30 % stenosis.  CX:   --  Circumflex: The vessel was small to medium sized.  --  Proximal circumflex: There was a 70 % stenosis.  RI:   --  Ramus intermedius: The vessel was medium sized. Angiography  showed no evidence of disease.  RCA:   --  Proximal RCA: There was a 80 % stenosis.  --  Mid RCA: There was a 90 % stenosis.  COMPLICATIONS: No complications occurred during the cath lab visit.  DIAGNOSTIC IMPRESSIONS: Proximal and mid RCA severe stenoses  Small to medium CX 70% stenosis  Normal LV systolic function  INTERVENTIONAL IMPRESSIONS: s/p ELLIOTT x 3 to proximal and midRCA ( 3.5 x 38  mm, 3.5 x 16 mm and 3.5 x 8 mm)  INTERVENTIONAL RECOMMENDATIONS: DAPT . ASA 81 mg indefinitely, clopidogrel  at least for 6 months  Statin to lower LDL < 70 mg/dl  Medical management for CX disease ( small to medium sized vessel ) unless  recurrent symptoms.    < end of copied text >         Noninvasive Testing:   Stress Test: Date: 7/21/22        Symptoms: SOB       EKG Changes: Baseline EKG T wave abnormality III, aVF, V5, V6       Myocardial Imaging: small, moderate defects in inferoapical and apical walls that are partially reversible, suggestive of mild ischemia       Risk Assessment (Low, Medium, High):     Echo:   EF 45-50%  mild AR  trace MVR  basal and mid inferior septum hypokinetic    Risk Assessments:  ASA:  Mallampati:  Bleeding Risk:  Creatinine:  GFR:    Associated Risk Factors:        Frailty Screening: (N/A, mild, mod, severe)       Cerebrovascular Disease: N/A       Chronic Lung Disease: y       Peripheral Arterial Disease: N/A       Chronic Kidney Disease (if yes, what is GFR): N/A       Uncontrolled Diabetes (if yes, what is HgbA1C or FBS): N/A       Poorly Controlled Hypertension (if yes, what is SBP): N/A       Morbid Obesity (if yes, what is BMI): N/A       History of Recent Ventricular Arrhythmia: N/A       Inability to Ambulate Safely: N/A       Need for Therapeutic Anticoagulation: N/A       Antiplatelet or Contrast Allergy: N/A    Antianginal Therapies:        Beta Blockers:         Calcium Channel Blockers:        Long Acting Nitrates:        Ranexa:     	  MEDICATIONS:                    ROS:     PHYSICAL EXAM:      T(C): --  HR: --  BP: --  RR: --  SpO2: --  Wt(kg): --      I&O's Summary      Daily     Daily     TELEMETRY: 	      ECG:  	    LABS:	 	                        Tnl:    Lipid Profile:   TC  TG  LDL  HDL    HgA1c:     proBNP:     TSH:     Impression/Plan:      ****-IVH with NS 250mL bolus pre-cath                                                                             Department of Cardiology                                                                  [facility]/[place] [address] [address] [facility]                                                            Telephone: [phone]. Fax:[phone]                                                                             Pre- Cardiac Procedure H + P      HPI:  76 year old male with h/o COPD, LEILA (uses CPAP), DE LA CRUZ, DM, CAD with PCI to p/m RCA (2/18/21) who had recent echo which revealed a decrease in EF form 60% to 45-50%.  Pt underwent a stress test which was positive for SOB and T wave inversions for LHC to assess coronary anatomy.       Symptoms:        Angina (Class): CCS3       Ischemic Symptoms: DE LA CRUZ    Heart Failure: NO       Systolic/Diastolic/Combined:        NYHA Class (within 2 weeks):     Assessment of LVEF:       EF: 45       Assessed by:        Date:     Prior Cardiac Interventions:    2/16/2021  EF 66%  p/m RCA stents  pCirc 70% stenosis    < from: Cardiac Cath Lab - Adult (02.16.21 @ 09:12) >  VENTRICLES: EF calculated by contrast ventriculography was 65 %.  CORONARY VESSELS: The coronary circulation is right dominant.  LM:   --  LM: The vessel was large sized. Angiography showed minor luminal  irregularities with no flow limiting lesions.  LAD:   --  LAD: The vessel was large sized.  --  Mid LAD: There was a 30 % stenosis.  CX:   --  Circumflex: The vessel was small to medium sized.  --  Proximal circumflex: There was a 70 % stenosis.  RI:   --  Ramus intermedius: The vessel was medium sized. Angiography  showed no evidence of disease.  RCA:   --  Proximal RCA: There was a 80 % stenosis.  --  Mid RCA: There was a 90 % stenosis.  COMPLICATIONS: No complications occurred during the cath lab visit.  DIAGNOSTIC IMPRESSIONS: Proximal and mid RCA severe stenoses  Small to medium CX 70% stenosis  Normal LV systolic function  INTERVENTIONAL IMPRESSIONS: s/p ELLIOTT x 3 to proximal and midRCA ( 3.5 x 38  mm, 3.5 x 16 mm and 3.5 x 8 mm)  INTERVENTIONAL RECOMMENDATIONS: DAPT . ASA 81 mg indefinitely, clopidogrel  at least for 6 months  Statin to lower LDL < 70 mg/dl  Medical management for CX disease ( small to medium sized vessel ) unless  recurrent symptoms.    < end of copied text >         Noninvasive Testing:   Stress Test: Date: 7/21/22        Symptoms: SOB       EKG Changes: Baseline EKG T wave abnormality III, aVF, V5, V6       Myocardial Imaging: small, moderate defects in inferoapical and apical walls that are partially reversible, suggestive of mild ischemia       Risk Assessment (Low, Medium, High):     Echo:   EF 45-50%  mild AR  trace MVR  basal and mid inferior septum hypokinetic    Associated Risk Factors:        Frailty Screening: (N/A, mild, mod, severe)       Cerebrovascular Disease: N/A       Chronic Lung Disease: y       Peripheral Arterial Disease: N/A       Chronic Kidney Disease (if yes, what is GFR): N/A       Uncontrolled Diabetes (if yes, what is HgbA1C or FBS): N/A       Poorly Controlled Hypertension (if yes, what is SBP): N/A       Morbid Obesity (if yes, what is BMI): N/A       History of Recent Ventricular Arrhythmia: N/A       Inability to Ambulate Safely: N/A       Need for Therapeutic Anticoagulation: N/A       Antiplatelet or Contrast Allergy: N/A    Antianginal Therapies:        Beta Blockers:  Yes metoprolol XL 25mg        Calcium Channel Blockers:        Long Acting Nitrates:        Ranexa:    REVIEW OF SYSTEMS:    CONSTITUTIONAL: No weakness, fevers or chills  EYES/ENT: No visual changes;  No vertigo or throat pain   NECK: No pain or stiffness  RESPIRATORY: No cough, wheezing, hemoptysis; chronic sob   CARDIOVASCULAR: No chest pain or palpitations  GASTROINTESTINAL: No abdominal or epigastric pain. No nausea, vomiting, or hematemesis; No diarrhea or constipation. No melena or hematochezia.  GENITOURINARY: No dysuria, frequency or hematuria  NEUROLOGICAL: No numbness or weakness  SKIN: No itching, burning, rashes, or lesions   All other review of systems is negative unless indicated above.  	    PHYSICAL EXAM:    Vital Signs Last 24 Hrs  T(C): --  T(F): --  HR: --  BP: --  BP(mean): --  RR: --  SpO2: --        GENERAL: Pt lying comfortably, NAD.  ENMT: PERRL, +EOMI.  NECK: soft, Supple, No JVD,   CHEST/LUNG: Clear to auscultatation bilaterally; No wheezing.  HEART: S1S2+, Regular rate and rhythm; No murmurs.  ABDOMEN: Soft, Nontender, Nondistended; Bowel sounds present.  MUSCULOSKELETAL: Normal range of motion.  SKIN: No rashes or lesions.  NEURO: AAOX3, no focal deficits, no motor r sensory loss.  PSYCH: normal mood.  Procedure site:   VASCULAR:   Radial +2 R/+2 L  Femoral +2 R/+2 L  PT +2 R/+2 L  DP +2 R/+2 L                                                                                       Department of Cardiology                                                                  [facility]/[place] [address] [facility]                                                            Telephone: [phone]. Fax:[phone]                                                                             Pre- Cardiac Procedure H + P      HPI:  76 year old male with h/o COPD, LEILA (uses CPAP), DE LA CRUZ, DM, CAD with PCI to p/m RCA (2/18/21) who had recent echo which revealed a decrease in EF form 60% to 45-50%.  Pt underwent a stress test which was positive for SOB and T wave inversions for LHC to assess coronary anatomy.       Symptoms:        Angina (Class): CCS3       Ischemic Symptoms: DE LA CRUZ    Heart Failure: NO       Systolic/Diastolic/Combined:        NYHA Class (within 2 weeks):     Assessment of LVEF:       EF: 45       Assessed by:        Date:     Prior Cardiac Interventions:    2/16/2021  EF 66%  p/m RCA stents  pCirc 70% stenosis    < from: Cardiac Cath Lab - Adult (02.16.21 @ 09:12) >  VENTRICLES: EF calculated by contrast ventriculography was 65 %.  CORONARY VESSELS: The coronary circulation is right dominant.  LM:   --  LM: The vessel was large sized. Angiography showed minor luminal  irregularities with no flow limiting lesions.  LAD:   --  LAD: The vessel was large sized.  --  Mid LAD: There was a 30 % stenosis.  CX:   --  Circumflex: The vessel was small to medium sized.  --  Proximal circumflex: There was a 70 % stenosis.  RI:   --  Ramus intermedius: The vessel was medium sized. Angiography  showed no evidence of disease.  RCA:   --  Proximal RCA: There was a 80 % stenosis.  --  Mid RCA: There was a 90 % stenosis.  COMPLICATIONS: No complications occurred during the cath lab visit.  DIAGNOSTIC IMPRESSIONS: Proximal and mid RCA severe stenoses  Small to medium CX 70% stenosis  Normal LV systolic function  INTERVENTIONAL IMPRESSIONS: s/p ELLIOTT x 3 to proximal and midRCA ( 3.5 x 38  mm, 3.5 x 16 mm and 3.5 x 8 mm)  INTERVENTIONAL RECOMMENDATIONS: DAPT . ASA 81 mg indefinitely, clopidogrel  at least for 6 months  Statin to lower LDL < 70 mg/dl  Medical management for CX disease ( small to medium sized vessel ) unless  recurrent symptoms.    < end of copied text >         Noninvasive Testing:   Stress Test: Date: 7/21/22        Symptoms: SOB       EKG Changes: Baseline EKG T wave abnormality III, aVF, V5, V6       Myocardial Imaging: small, moderate defects in inferoapical and apical walls that are partially reversible, suggestive of mild ischemia       Risk Assessment (Low, Medium, High):     Echo:   EF 45-50%  mild AR  trace MVR  basal and mid inferior septum hypokinetic    Associated Risk Factors:        Frailty Screening: (N/A, mild, mod, severe)       Cerebrovascular Disease: N/A       Chronic Lung Disease: y       Peripheral Arterial Disease: N/A       Chronic Kidney Disease (if yes, what is GFR): N/A       Uncontrolled Diabetes (if yes, what is HgbA1C or FBS): N/A       Poorly Controlled Hypertension (if yes, what is SBP): N/A       Morbid Obesity (if yes, what is BMI): N/A       History of Recent Ventricular Arrhythmia: N/A       Inability to Ambulate Safely: N/A       Need for Therapeutic Anticoagulation: N/A       Antiplatelet or Contrast Allergy: N/A    Antianginal Therapies:        Beta Blockers:  Yes metoprolol XL 25mg        Calcium Channel Blockers:        Long Acting Nitrates:        Ranexa:    REVIEW OF SYSTEMS:    CONSTITUTIONAL: No weakness, fevers or chills  EYES/ENT: No visual changes;  No vertigo or throat pain   NECK: No pain or stiffness  RESPIRATORY: No cough, wheezing, hemoptysis; chronic sob   CARDIOVASCULAR: No chest pain or palpitations  GASTROINTESTINAL: No abdominal or epigastric pain. No nausea, vomiting, or hematemesis; No diarrhea or constipation. No melena or hematochezia.  GENITOURINARY: No dysuria, frequency or hematuria  NEUROLOGICAL: No numbness or weakness  SKIN: No itching, burning, rashes, or lesions   All other review of systems is negative unless indicated above.  	    PHYSICAL EXAM:      GENERAL: Pt lying comfortably, NAD.  ENMT: PERRL, +EOMI.  NECK: soft, Supple, No JVD,   CHEST/LUNG: Clear to auscultatation bilaterally; No wheezing.  HEART: S1S2+, Regular rate and rhythm; No murmurs.  ABDOMEN: Soft, Nontender, Nondistended; Bowel sounds present.  MUSCULOSKELETAL: Normal range of motion.  SKIN: No rashes or lesions.  NEURO: AAOX3, no focal deficits, no motor r sensory loss.  PSYCH: normal mood.  Procedure site:   VASCULAR:   Radial +2 R/+2 L  Femoral +2 R/+2 L  PT +2 R/+2 L  DP +2 R/+2 L

## 2022-08-16 ENCOUNTER — TRANSCRIPTION ENCOUNTER (OUTPATIENT)
Age: 77
End: 2022-08-16

## 2022-08-16 ENCOUNTER — OUTPATIENT (OUTPATIENT)
Dept: OUTPATIENT SERVICES | Facility: HOSPITAL | Age: 77
LOS: 1 days | Discharge: HOME | End: 2022-08-16
Payer: COMMERCIAL

## 2022-08-16 VITALS
RESPIRATION RATE: 18 BRPM | TEMPERATURE: 98 F | HEART RATE: 72 BPM | SYSTOLIC BLOOD PRESSURE: 176 MMHG | WEIGHT: 169.98 LBS | HEIGHT: 68 IN | DIASTOLIC BLOOD PRESSURE: 79 MMHG | OXYGEN SATURATION: 98 %

## 2022-08-16 VITALS
SYSTOLIC BLOOD PRESSURE: 153 MMHG | DIASTOLIC BLOOD PRESSURE: 76 MMHG | RESPIRATION RATE: 17 BRPM | OXYGEN SATURATION: 98 % | HEART RATE: 62 BPM

## 2022-08-16 DIAGNOSIS — R94.31 ABNORMAL ELECTROCARDIOGRAM [ECG] [EKG]: ICD-10-CM

## 2022-08-16 DIAGNOSIS — E11.9 TYPE 2 DIABETES MELLITUS WITHOUT COMPLICATIONS: ICD-10-CM

## 2022-08-16 DIAGNOSIS — Z98.89 OTHER SPECIFIED POSTPROCEDURAL STATES: Chronic | ICD-10-CM

## 2022-08-16 DIAGNOSIS — N40.0 BENIGN PROSTATIC HYPERPLASIA WITHOUT LOWER URINARY TRACT SYMPTOMS: ICD-10-CM

## 2022-08-16 DIAGNOSIS — I10 ESSENTIAL (PRIMARY) HYPERTENSION: ICD-10-CM

## 2022-08-16 DIAGNOSIS — E78.5 HYPERLIPIDEMIA, UNSPECIFIED: ICD-10-CM

## 2022-08-16 DIAGNOSIS — Z98.890 OTHER SPECIFIED POSTPROCEDURAL STATES: Chronic | ICD-10-CM

## 2022-08-16 DIAGNOSIS — J44.9 CHRONIC OBSTRUCTIVE PULMONARY DISEASE, UNSPECIFIED: ICD-10-CM

## 2022-08-16 DIAGNOSIS — K21.9 GASTRO-ESOPHAGEAL REFLUX DISEASE WITHOUT ESOPHAGITIS: ICD-10-CM

## 2022-08-16 LAB
ANION GAP SERPL CALC-SCNC: 10 MMOL/L — SIGNIFICANT CHANGE UP (ref 5–17)
BUN SERPL-MCNC: 14.3 MG/DL — SIGNIFICANT CHANGE UP (ref 8–20)
CALCIUM SERPL-MCNC: 9.2 MG/DL — SIGNIFICANT CHANGE UP (ref 8.4–10.5)
CHLORIDE SERPL-SCNC: 104 MMOL/L — SIGNIFICANT CHANGE UP (ref 98–107)
CO2 SERPL-SCNC: 27 MMOL/L — SIGNIFICANT CHANGE UP (ref 22–29)
CREAT SERPL-MCNC: 0.96 MG/DL — SIGNIFICANT CHANGE UP (ref 0.5–1.3)
EGFR: 82 ML/MIN/1.73M2 — SIGNIFICANT CHANGE UP
GLUCOSE SERPL-MCNC: 149 MG/DL — HIGH (ref 70–99)
HCT VFR BLD CALC: 43.9 % — SIGNIFICANT CHANGE UP (ref 39–50)
HGB BLD-MCNC: 15.1 G/DL — SIGNIFICANT CHANGE UP (ref 13–17)
MAGNESIUM SERPL-MCNC: 2.3 MG/DL — SIGNIFICANT CHANGE UP (ref 1.6–2.6)
MCHC RBC-ENTMCNC: 31.9 PG — SIGNIFICANT CHANGE UP (ref 27–34)
MCHC RBC-ENTMCNC: 34.4 GM/DL — SIGNIFICANT CHANGE UP (ref 32–36)
MCV RBC AUTO: 92.6 FL — SIGNIFICANT CHANGE UP (ref 80–100)
PLATELET # BLD AUTO: 230 K/UL — SIGNIFICANT CHANGE UP (ref 150–400)
POTASSIUM SERPL-MCNC: 4.9 MMOL/L — SIGNIFICANT CHANGE UP (ref 3.5–5.3)
POTASSIUM SERPL-SCNC: 4.9 MMOL/L — SIGNIFICANT CHANGE UP (ref 3.5–5.3)
RBC # BLD: 4.74 M/UL — SIGNIFICANT CHANGE UP (ref 4.2–5.8)
RBC # FLD: 13.4 % — SIGNIFICANT CHANGE UP (ref 10.3–14.5)
SODIUM SERPL-SCNC: 140 MMOL/L — SIGNIFICANT CHANGE UP (ref 135–145)
WBC # BLD: 6.23 K/UL — SIGNIFICANT CHANGE UP (ref 3.8–10.5)
WBC # FLD AUTO: 6.23 K/UL — SIGNIFICANT CHANGE UP (ref 3.8–10.5)

## 2022-08-16 PROCEDURE — 99152 MOD SED SAME PHYS/QHP 5/>YRS: CPT

## 2022-08-16 PROCEDURE — 92928 PRQ TCAT PLMT NTRAC ST 1 LES: CPT | Mod: LC

## 2022-08-16 PROCEDURE — 93005 ELECTROCARDIOGRAM TRACING: CPT

## 2022-08-16 PROCEDURE — C1887: CPT

## 2022-08-16 PROCEDURE — C1769: CPT

## 2022-08-16 PROCEDURE — 85027 COMPLETE CBC AUTOMATED: CPT

## 2022-08-16 PROCEDURE — 93458 L HRT ARTERY/VENTRICLE ANGIO: CPT | Mod: 26,XU

## 2022-08-16 PROCEDURE — C1725: CPT

## 2022-08-16 PROCEDURE — 83735 ASSAY OF MAGNESIUM: CPT

## 2022-08-16 PROCEDURE — C1874: CPT

## 2022-08-16 PROCEDURE — 93010 ELECTROCARDIOGRAM REPORT: CPT | Mod: 76

## 2022-08-16 PROCEDURE — 80048 BASIC METABOLIC PNL TOTAL CA: CPT

## 2022-08-16 PROCEDURE — C9600: CPT | Mod: LC

## 2022-08-16 PROCEDURE — 36415 COLL VENOUS BLD VENIPUNCTURE: CPT

## 2022-08-16 PROCEDURE — 93458 L HRT ARTERY/VENTRICLE ANGIO: CPT

## 2022-08-16 PROCEDURE — C1894: CPT

## 2022-08-16 RX ORDER — SODIUM CHLORIDE 9 MG/ML
250 INJECTION INTRAMUSCULAR; INTRAVENOUS; SUBCUTANEOUS ONCE
Refills: 0 | Status: COMPLETED | OUTPATIENT
Start: 2022-08-16 | End: 2022-08-16

## 2022-08-16 RX ORDER — ASPIRIN/CALCIUM CARB/MAGNESIUM 324 MG
81 TABLET ORAL ONCE
Refills: 0 | Status: COMPLETED | OUTPATIENT
Start: 2022-08-16 | End: 2022-08-16

## 2022-08-16 RX ORDER — METFORMIN HYDROCHLORIDE 850 MG/1
1 TABLET ORAL
Qty: 0 | Refills: 0 | DISCHARGE

## 2022-08-16 RX ORDER — HYDRALAZINE HCL 50 MG
10 TABLET ORAL ONCE
Refills: 0 | Status: COMPLETED | OUTPATIENT
Start: 2022-08-16 | End: 2022-08-16

## 2022-08-16 RX ADMIN — Medication 81 MILLIGRAM(S): at 08:43

## 2022-08-16 RX ADMIN — SODIUM CHLORIDE 250 MILLILITER(S): 9 INJECTION INTRAMUSCULAR; INTRAVENOUS; SUBCUTANEOUS at 08:44

## 2022-08-16 RX ADMIN — SODIUM CHLORIDE 250 MILLILITER(S): 9 INJECTION INTRAMUSCULAR; INTRAVENOUS; SUBCUTANEOUS at 11:20

## 2022-08-16 RX ADMIN — Medication 10 MILLIGRAM(S): at 12:07

## 2022-08-16 NOTE — DISCHARGE NOTE NURSING/CASE MANAGEMENT/SOCIAL WORK - NSDCPEFALRISK_GEN_ALL_CORE
For information on Fall & Injury Prevention, visit: [email or web address] OR  [email or web address] OR  https://www.cdc.gov/steadi/patient.html

## 2022-08-16 NOTE — DISCHARGE NOTE NURSING/CASE MANAGEMENT/SOCIAL WORK - PATIENT PORTAL LINK FT
You can access the FollowMyHealth Patient Portal offered by [facility] by registering at the following website: [email or web address]. By joining [employer]’s FollowMyHealth portal, you will also be able to view your health information using other applications (apps) compatible with our system.

## 2022-08-16 NOTE — PROGRESS NOTE ADULT - SUBJECTIVE AND OBJECTIVE BOX
Department of Cardiology                                                                  [facility]/[place] [address] [id number]                                                            Telephone: [phone]. Fax:[phone]                                                    Post- Procedure Note: Left Heart Cardiac Catheterization       Narrative:   76y  Male is now s/p left heart catheterization via RRA approach with Dr. Hayes :, tolerated procedure well without complications. Arrived to recovery room NAD and hemodynamically stable, distal pulse +, neurovascular intact          PAST MEDICAL & SURGICAL HISTORY:  Esophageal cancer  Hypertension  Emphysema  GERD (gastroesophageal reflux disease)  High cholesterol  Sleep apnea  Pneumonia  BPH (benign prostatic hyperplasia)  Diabetes mellitus  H/O carotid artery stenosis  LEILA on CPAP  History of cerebral aneurysm  CAD (coronary artery disease)  S/P hernia surgery  Umblical Hernia Repair  S/P colonoscopy  S/P colectomy  Right Hemicolectomy secondary to polyps.  History of esophagectomy  S/P coil embolization of cerebral aneurysm  H/O cardiac catheterization  Pci to RCA        Home Medications:  albuterol: 2 puff(s) inhaled every 4 hours, As Needed (16 Aug 2022 08:47)  amLODIPine 5 mg oral tablet: 1 tab(s) orally once a day (16 Aug 2022 08:47)  aspirin 81 mg oral tablet, chewable: 1 tab(s) orally once a day (16 Aug 2022 08:47)  atorvastatin 40 mg oral tablet: 1 tab(s) orally once a day (16 Aug 2022 08:47)  Calcium 600+D oral tablet: 1 tab(s) orally once a day (16 Aug 2022 08:47)  Centrum oral tablet: 1 tab(s) orally once a day (16 Aug 2022 08:47)  Dexilant 60 mg oral delayed release capsule: 1 cap(s) orally once a day, As Needed (16 Aug 2022 08:47)  famotidine 20 mg oral tablet:  (16 Aug 2022 08:47)  Fish Oil 1200 mg oral capsule:  orally  (16 Aug 2022 08:47)  metFORMIN 500 mg oral tablet, extended release: 1 tab(s) orally once a day - RESTART on 21.  (16 Aug 2022 08:47)  metoprolol tartrate 25 mg oral tablet: 0.5 tab(s) orally 2 times a day (16 Aug 2022 08:47)  pantoprazole 40 mg oral delayed release tablet: 1 tab(s) orally once a day (16 Aug 2022 08:47)  ramipril 10 mg oral capsule: 2 cap(s) orally once a day (at bedtime) (16 Aug 2022 08:47)  Symbicort 160 mcg-4.5 mcg/inh inhalation aerosol: 2 puff(s) inhaled 2 times a day (16 Aug 2022 08:47)  tamsulosin 0.4 mg oral capsule: 2 cap(s) orally once a day (16 Aug 2022 08:47)  tumeric   1000 m tab(s) orally once a day (16 Aug 2022 08:47)  vitamin b complex with c: 1 tab(s) orally once a day (16 Aug 2022 08:47)        No Known Allergies      Objective:  Vital Signs Last 24 Hrs  T(C): 36.7 (16 Aug 2022 09:10), Max: 36.7 (16 Aug 2022 09:10)  T(F): 98 (16 Aug 2022 09:10), Max: 98 (16 Aug 2022 09:10)  HR: 53 (16 Aug 2022 10:45) (53 - 72)  BP: 156/74 (16 Aug 2022 10:45) (156/74 - 184/81)  RR: 17 (16 Aug 2022 10:45) (17 - 18)  SpO2: 95% (16 Aug 2022 10:45) (95% - 98%)    Parameters below as of 16 Aug 2022 10:45  Patient On (Oxygen Delivery Method): room air        GENERAL: Pt lying comfortably, NAD.  ENMT: PERRL, +EOMI.  NECK: soft, Supple, No JVD,   CHEST/LUNG: Clear to auscultatation bilaterally; No wheezing.  HEART: S1S2+, Regular rate and rhythm; No murmurs.  ABDOMEN: Soft, Nontender, Nondistended; Bowel sounds present.  MUSCULOSKELETAL: Normal range of motion.  SKIN: No rashes or lesions.  NEURO: AAOX3, no focal deficits, no motor r sensory loss.  PSYCH: normal mood.  Procedure site: RRA, no signs of bleeding or hematoma, neurovascular intact   VASCULAR:   Radial +2 R/+2 L                            15.1   6.23  )-----------( 230      ( 16 Aug 2022 08:15 )             43.9     08-16    140  |  104  |  14.3  ----------------------------<  149<H>  4.9   |  27.0  |  0.96    Ca    9.2      16 Aug 2022 08:15  Mg     2.3     08

## 2022-08-16 NOTE — DISCHARGE NOTE PROVIDER - CARE PROVIDER_API CALL
Fortunato Baker)  Cardiovascular Disease  [address] [facility], [place] [address]  Phone: [phone]  Fax: [phone]  Follow Up Time: 2 weeks

## 2022-08-16 NOTE — DISCHARGE NOTE PROVIDER - NSDCCPCAREPLAN_GEN_ALL_CORE_FT
PRINCIPAL DISCHARGE DIAGNOSIS  Diagnosis: CAD (coronary artery disease)  Assessment and Plan of Treatment: s/p C findings: patent RCA stents  prox Lcx with 80% lesion treated with ELLIOTT   continue dual antiplatelet therapy   continue statin/bb/ace   cardiac rehab info provided/referral and communication to cardiac rehab completed   Follow up with Dr. Baker in 2 weeks

## 2022-08-16 NOTE — DISCHARGE NOTE NURSING/CASE MANAGEMENT/SOCIAL WORK - NSFLUVACAGEDISCH_IMM_ALL_CORE
Adult Bilobed Transposition Flap Text: The defect edges were debeveled with a #15 scalpel blade.  Given the location of the defect and the proximity to free margins a bilobed transposition flap was deemed most appropriate.  Using a sterile surgical marker, an appropriate bilobe flap drawn around the defect.    The area thus outlined was incised deep to adipose tissue with a #15 scalpel blade.  The skin margins were undermined to an appropriate distance in all directions utilizing iris scissors.

## 2022-08-16 NOTE — DISCHARGE NOTE PROVIDER - NSDCFUSCHEDAPPT_GEN_ALL_CORE_FT
Noe Reardon  [facility]  [facility] [address]  Scheduled Appointment: 08/18/2022

## 2022-08-16 NOTE — PROGRESS NOTE ADULT - ASSESSMENT
Patient is a 76y old  Male who presents with a chief complaint of Cardiac Cath (15 Aug 2022 15:01)    76y  Male is now s/p left heart catheterization via RRA approach with Dr. Hayes :  Intraprocedurally: Pt rec'd IV sedation, Heparin 9,000 units, Plavix 300mg oral load, and Omnipaque 71ml   Findings: patent RCA stents, LCx 80% prox treated with ELLIOTT x1  Post Cath EK bpm SB unchanged     -post cardiac cath orders  -radial  precautions  -remove radial band at 1300, OOB 1330 and discharge home if remains hemodynamically stable and no bleeding complications  -EKG post cath  -NS 0.9% 250ml/hr x 1 bolus: post procedure OC ppx   -continue current medical therapy  -Dual anti platelet therapy with aspirin/ plavix, reinforced importance of strict adherence to DAPT   -high intensity statin therapy Lipitor 80mg daily   -continue Ramipril and Toprol   -follow up outpt in 2 weeks with Cardiologist: Dr. Baker   -Lifestyle modifications discussed to reduce cardiovascular risk factors including weight reduction, smoking cessation, medication compliance, and routine follow up with Cardiologist to track your BMI, cholesterol, and glucose levels.   - cardiac rehab info provided/referral and communication to cardiac rehab completed     Plan of care discussed with patient, spouse, and Dr. Hayes

## 2022-08-16 NOTE — ASU PATIENT PROFILE, ADULT - FALL HARM RISK - HARM RISK INTERVENTIONS

## 2022-08-16 NOTE — DISCHARGE NOTE PROVIDER - HOSPITAL COURSE
Brief Hospital Course: 76 year old male with h/o COPD, LEILA (uses CPAP), DE LA CRUZ, DM, CAD with PCI to p/m RCA (21) who had recent echo which revealed a decrease in EF form 60% to 45-50%.  Pt underwent a stress test which was positive for SOB and T wave inversions for LHC to assess coronary anatomy. 76y  Male is now s/p left heart catheterization via RRA approach with Dr. Hayes :  Intraprocedurally: Pt rec'd IV sedation, Heparin 9,000 units, Plavix 300mg oral load, and Omnipaque 71ml   Findings: patent RCA stents, LCx 80% prox treated with ELLIOTT x1  Post Cath EK bpm SB unchanged     -post cardiac cath orders  -radial  precautions  -remove radial band at 1300, OOB 1330 and discharge home if remains hemodynamically stable and no bleeding complications  -EKG post cath  -NS 0.9% 250ml/hr x 1 bolus: post procedure OC ppx   -continue current medical therapy  -Dual anti platelet therapy with aspirin/ plavix, reinforced importance of strict adherence to DAPT   -high intensity statin therapy Lipitor 40mg daily   -continue ACE/BB  -Hold metformin until 2022 am   -follow up outpt in 2 weeks with Cardiologist: Dr. Baker   -Lifestyle modifications discussed to reduce cardiovascular risk factors including weight reduction, smoking cessation, medication compliance, and routine follow up with Cardiologist to track your BMI, cholesterol, and glucose levels.   - cardiac rehab info provided/referral and communication to cardiac rehab completed     Plan of care discussed with patient, spouse, and Dr. Hayes   At the time of discharge patient was hemodynamically stable and amenable to all terms of discharge. The patient has received verbal instructions from myself regarding discharge plans.     Length of Discharge: 45MIN    Patient is medically stable and cleared for discharge to home with outpatient follow up.

## 2022-08-16 NOTE — DISCHARGE NOTE PROVIDER - NSDCMRMEDTOKEN_GEN_ALL_CORE_FT
albuterol: 2 puff(s) inhaled every 4 hours, As Needed  amLODIPine 5 mg oral tablet: 1 tab(s) orally once a day  aspirin 81 mg oral tablet, chewable: 1 tab(s) orally once a day  atorvastatin 40 mg oral tablet: 1 tab(s) orally once a day  Calcium 600+D oral tablet: 1 tab(s) orally once a day  Centrum oral tablet: 1 tab(s) orally once a day  clopidogrel 75 mg oral tablet: 1 tab(s) orally once a day  Dexilant 60 mg oral delayed release capsule: 1 cap(s) orally once a day, As Needed  famotidine 20 mg oral tablet:   Fish Oil 1200 mg oral capsule:  orally   metFORMIN 500 mg oral tablet, extended release: 1 tab(s) orally once a day   HOLD UNTIL 2022 AM  metoprolol tartrate 25 mg oral tablet: 0.5 tab(s) orally 2 times a day  pantoprazole 40 mg oral delayed release tablet: 1 tab(s) orally once a day  ramipril 10 mg oral capsule: 2 cap(s) orally once a day (at bedtime)  Symbicort 160 mcg-4.5 mcg/inh inhalation aerosol: 2 puff(s) inhaled 2 times a day  tamsulosin 0.4 mg oral capsule: 2 cap(s) orally once a day  tumeric   1000 m tab(s) orally once a day  vitamin b complex with c: 1 tab(s) orally once a day

## 2022-08-17 PROBLEM — I25.10 ATHEROSCLEROTIC HEART DISEASE OF NATIVE CORONARY ARTERY WITHOUT ANGINA PECTORIS: Chronic | Status: ACTIVE | Noted: 2022-08-15

## 2022-08-18 ENCOUNTER — NON-APPOINTMENT (OUTPATIENT)
Age: 77
End: 2022-08-18

## 2022-08-18 ENCOUNTER — APPOINTMENT (OUTPATIENT)
Dept: INTERNAL MEDICINE | Facility: CLINIC | Age: 77
End: 2022-08-18

## 2022-08-18 ENCOUNTER — LABORATORY RESULT (OUTPATIENT)
Age: 77
End: 2022-08-18

## 2022-08-18 VITALS — DIASTOLIC BLOOD PRESSURE: 82 MMHG | HEART RATE: 60 BPM | SYSTOLIC BLOOD PRESSURE: 116 MMHG | RESPIRATION RATE: 12 BRPM

## 2022-08-18 VITALS — BODY MASS INDEX: 25.39 KG/M2 | WEIGHT: 167 LBS

## 2022-08-18 DIAGNOSIS — Z23 ENCOUNTER FOR IMMUNIZATION: ICD-10-CM

## 2022-08-18 DIAGNOSIS — F17.211 NICOTINE DEPENDENCE, CIGARETTES, IN REMISSION: ICD-10-CM

## 2022-08-18 DIAGNOSIS — Z00.00 ENCOUNTER FOR GENERAL ADULT MEDICAL EXAMINATION W/OUT ABNORMAL FINDINGS: ICD-10-CM

## 2022-08-18 DIAGNOSIS — N13.8 BENIGN PROSTATIC HYPERPLASIA WITH LOWER URINARY TRACT SYMPMS: ICD-10-CM

## 2022-08-18 DIAGNOSIS — I25.118 ATHEROSCLEROTIC HEART DISEASE OF NATIVE CORONARY ARTERY WITH OTHER FORMS OF ANGINA PECTORIS: ICD-10-CM

## 2022-08-18 DIAGNOSIS — N40.1 BENIGN PROSTATIC HYPERPLASIA WITH LOWER URINARY TRACT SYMPMS: ICD-10-CM

## 2022-08-18 LAB
25(OH)D3 SERPL-MCNC: 65.6 NG/ML
ALBUMIN SERPL ELPH-MCNC: 4.5 G/DL
ALP BLD-CCNC: 107 U/L
ALT SERPL-CCNC: 17 U/L
ANION GAP SERPL CALC-SCNC: 13 MMOL/L
APPEARANCE: CLEAR
AST SERPL-CCNC: 20 U/L
BASOPHILS # BLD AUTO: 0.03 K/UL
BASOPHILS NFR BLD AUTO: 0.5 %
BILIRUB SERPL-MCNC: 0.5 MG/DL
BILIRUBIN URINE: NEGATIVE
BLOOD URINE: NORMAL
BUN SERPL-MCNC: 18 MG/DL
CALCIUM SERPL-MCNC: 9.4 MG/DL
CHLORIDE SERPL-SCNC: 105 MMOL/L
CHOLEST SERPL-MCNC: 111 MG/DL
CO2 SERPL-SCNC: 21 MMOL/L
COLOR: ABNORMAL
COVID-19 SPIKE DOMAIN ANTIBODY INTERPRETATION: POSITIVE
CREAT SERPL-MCNC: 0.84 MG/DL
CREAT SPEC-SCNC: 116 MG/DL
EGFR: 91 ML/MIN/1.73M2
EOSINOPHIL # BLD AUTO: 0.12 K/UL
EOSINOPHIL NFR BLD AUTO: 2 %
ESTIMATED AVERAGE GLUCOSE: 146 MG/DL
GLUCOSE QUALITATIVE U: NEGATIVE
GLUCOSE SERPL-MCNC: 120 MG/DL
HBA1C MFR BLD HPLC: 6.7 %
HCT VFR BLD CALC: 43.4 %
HDLC SERPL-MCNC: 49 MG/DL
HGB BLD-MCNC: 14.5 G/DL
IMM GRANULOCYTES NFR BLD AUTO: 0.3 %
KETONES URINE: NEGATIVE
LDLC SERPL CALC-MCNC: 51 MG/DL
LEUKOCYTE ESTERASE URINE: NEGATIVE
LYMPHOCYTES # BLD AUTO: 1.27 K/UL
LYMPHOCYTES NFR BLD AUTO: 21 %
MAN DIFF?: NORMAL
MCHC RBC-ENTMCNC: 31.4 PG
MCHC RBC-ENTMCNC: 33.4 GM/DL
MCV RBC AUTO: 93.9 FL
MICROALBUMIN 24H UR DL<=1MG/L-MCNC: 4.9 MG/DL
MICROALBUMIN/CREAT 24H UR-RTO: 42 MG/G
MONOCYTES # BLD AUTO: 0.74 K/UL
MONOCYTES NFR BLD AUTO: 12.2 %
NEUTROPHILS # BLD AUTO: 3.88 K/UL
NEUTROPHILS NFR BLD AUTO: 64 %
NITRITE URINE: NEGATIVE
NONHDLC SERPL-MCNC: 63 MG/DL
PH URINE: 5.5
PLATELET # BLD AUTO: 255 K/UL
POTASSIUM SERPL-SCNC: 4.7 MMOL/L
PROT SERPL-MCNC: 7 G/DL
PROTEIN URINE: NORMAL
PSA SERPL-MCNC: 1.43 NG/ML
RBC # BLD: 4.62 M/UL
RBC # FLD: 14 %
SARS-COV-2 AB SERPL IA-ACNC: >250 U/ML
SODIUM SERPL-SCNC: 140 MMOL/L
SPECIFIC GRAVITY URINE: 1.02
T4 FREE SERPL-MCNC: 1.4 NG/DL
TRIGL SERPL-MCNC: 57 MG/DL
TSH SERPL-ACNC: 1.48 UIU/ML
UROBILINOGEN URINE: NORMAL
WBC # FLD AUTO: 6.06 K/UL

## 2022-08-18 PROCEDURE — G0009: CPT

## 2022-08-18 PROCEDURE — 36415 COLL VENOUS BLD VENIPUNCTURE: CPT

## 2022-08-18 PROCEDURE — G0438: CPT

## 2022-08-18 PROCEDURE — 90677 PCV20 VACCINE IM: CPT

## 2022-08-18 NOTE — HEALTH RISK ASSESSMENT
[Excellent] : ~his/her~  mood as  excellent [Former] : Former [Yes] : Yes [Monthly or less (1 pt)] : Monthly or less (1 point) [No falls in past year] : Patient reported no falls in the past year [0] : 2) Feeling down, depressed, or hopeless: Not at all (0) [PHQ-2 Negative - No further assessment needed] : PHQ-2 Negative - No further assessment needed [[id number]] : 0 [HIV test declined] : HIV test declined [Hepatitis C test declined] : Hepatitis C test declined [Change in mental status noted] : No change in mental status noted [Language] : denies difficulty with language [Behavior] : denies difficulty with behavior [Learning/Retaining New Information] : denies difficulty learning/retaining new information [Handling Complex Tasks] : denies difficulty handling complex tasks [Reasoning] : denies difficulty with reasoning [Spatial Ability and Orientation] : denies difficulty with spatial ability and orientation [None] : None [With Significant Other] : lives with significant other [High School] : high school [] :  [Sexually Active] : sexually active [High Risk Behavior] : no high risk behavior [Feels Safe at Home] : Feels safe at home [Fully functional (bathing, dressing, toileting, transferring, walking, feeding)] : Fully functional (bathing, dressing, toileting, transferring, walking, feeding) [Fully functional (using the telephone, shopping, preparing meals, housekeeping, doing laundry, using] : Fully functional and needs no help or supervision to perform IADLs (using the telephone, shopping, preparing meals, housekeeping, doing laundry, using transportation, managing medications and managing finances) [Reports changes in hearing] : Reports no changes in hearing [Reports changes in vision] : Reports no changes in vision [Reports normal functional visual acuity (ie: able to read med bottle)] : Reports poor functional visual acuity.  [Reports changes in dental health] : Reports no changes in dental health [Smoke Detector] : smoke detector [Carbon Monoxide Detector] : carbon monoxide detector [Guns at Home] : no guns at home [Safety elements used in home] : safety elements used in home [Seat Belt] :  uses seat belt [Sunscreen] : does not use sunscreen [TB Exposure] : is not being exposed to tuberculosis [Caregiver Concerns] : does not have caregiver concerns [ColonoscopyDate] : due [AdvancecareDate] : 8/18/22

## 2022-08-18 NOTE — HISTORY OF PRESENT ILLNESS
[FreeTextEntry1] : For CPE [de-identified] : For CPE history of esophageal cancer, ashd, recent stent CX with ef 53 percent, dm, htn \par hld, in the past suffered aneurysm with web repair and hemicolectomy from large polyp\par Retired . He has been in stable health since stent\par failed a stress test prior to that.

## 2022-08-18 NOTE — ASSESSMENT
[FreeTextEntry1] : ashd stable s/p stent\par bp stalbe\par check labs\par prior aneurysm (cerebral)  sees Will Morton NS\par prevenar given\par dm appears stable\par follow up 4months Ivonne

## 2022-08-29 ENCOUNTER — APPOINTMENT (OUTPATIENT)
Dept: CARDIOLOGY | Facility: CLINIC | Age: 77
End: 2022-08-29

## 2022-08-29 VITALS
HEIGHT: 68 IN | SYSTOLIC BLOOD PRESSURE: 112 MMHG | WEIGHT: 168 LBS | BODY MASS INDEX: 25.46 KG/M2 | DIASTOLIC BLOOD PRESSURE: 62 MMHG | OXYGEN SATURATION: 98 % | HEART RATE: 61 BPM | TEMPERATURE: 98 F

## 2022-08-29 DIAGNOSIS — I65.29 OCCLUSION AND STENOSIS OF UNSPECIFIED CAROTID ARTERY: ICD-10-CM

## 2022-08-29 DIAGNOSIS — R06.00 DYSPNEA, UNSPECIFIED: ICD-10-CM

## 2022-08-29 PROCEDURE — 93000 ELECTROCARDIOGRAM COMPLETE: CPT

## 2022-08-29 PROCEDURE — 99214 OFFICE O/P EST MOD 30 MIN: CPT | Mod: 25

## 2022-08-29 RX ORDER — CALCIUM CARBONATE/VITAMIN D3 600 MG-20
600-800 TABLET ORAL TWICE DAILY
Refills: 0 | Status: DISCONTINUED | COMMUNITY
End: 2022-08-29

## 2022-10-08 ENCOUNTER — EMERGENCY (EMERGENCY)
Facility: HOSPITAL | Age: 77
LOS: 1 days | Discharge: HOME | End: 2022-10-08
Attending: EMERGENCY MEDICINE
Payer: COMMERCIAL

## 2022-10-08 ENCOUNTER — TRANSCRIPTION ENCOUNTER (OUTPATIENT)
Age: 77
End: 2022-10-08

## 2022-10-08 VITALS
SYSTOLIC BLOOD PRESSURE: 124 MMHG | RESPIRATION RATE: 20 BRPM | DIASTOLIC BLOOD PRESSURE: 73 MMHG | TEMPERATURE: 98 F | HEART RATE: 61 BPM | OXYGEN SATURATION: 96 % | HEIGHT: 68 IN | WEIGHT: 169.98 LBS

## 2022-10-08 VITALS
RESPIRATION RATE: 20 BRPM | HEART RATE: 64 BPM | SYSTOLIC BLOOD PRESSURE: 169 MMHG | OXYGEN SATURATION: 98 % | DIASTOLIC BLOOD PRESSURE: 84 MMHG

## 2022-10-08 DIAGNOSIS — Z98.89 OTHER SPECIFIED POSTPROCEDURAL STATES: Chronic | ICD-10-CM

## 2022-10-08 DIAGNOSIS — Z98.890 OTHER SPECIFIED POSTPROCEDURAL STATES: Chronic | ICD-10-CM

## 2022-10-08 LAB
FLUAV AG NPH QL: SIGNIFICANT CHANGE UP
FLUBV AG NPH QL: SIGNIFICANT CHANGE UP
RSV RNA NPH QL NAA+NON-PROBE: SIGNIFICANT CHANGE UP
SARS-COV-2 RNA SPEC QL NAA+PROBE: DETECTED

## 2022-10-08 PROCEDURE — 99215 OFFICE O/P EST HI 40 MIN: CPT

## 2022-10-08 PROCEDURE — 99284 EMERGENCY DEPT VISIT MOD MDM: CPT

## 2022-10-08 PROCEDURE — 99205 OFFICE O/P NEW HI 60 MIN: CPT

## 2022-10-08 PROCEDURE — 73590 X-RAY EXAM OF LOWER LEG: CPT | Mod: 26,LT

## 2022-10-08 RX ORDER — BEBTELOVIMAB 87.5 MG/ML
175 INJECTION, SOLUTION INTRAVENOUS ONCE
Refills: 0 | Status: COMPLETED | OUTPATIENT
Start: 2022-10-08 | End: 2022-10-08

## 2022-10-08 RX ADMIN — BEBTELOVIMAB 175 MILLIGRAM(S): 87.5 INJECTION, SOLUTION INTRAVENOUS at 17:05

## 2022-10-08 NOTE — ED PROVIDER NOTE - PATIENT PORTAL LINK FT
You can access the FollowMyHealth Patient Portal offered by  by registering at the following website: [email or web address]. By joining [facility]’s FollowMyHealth portal, you will also be able to view your health information using other applications (apps) compatible with our system.

## 2022-10-08 NOTE — ED PROVIDER NOTE - NS ED ROS FT
Gen: +Fever, +Chills. Denies fatigue, weight loss  Skin: denies rashes, laceration, bruising  HEENT: +Throat discomfort. denies visual changes, ear pain, nasal congestion  Respiratory: denies DE LA CRUZ, SOB, cough, wheezing  Cardiovascular: denies chest pain, palpitations, diaphoresis, LE edema  GI: denies abdominal pain, n/v/d  : denies dysuria, frequency, urgency, bowel/bladder incontinence  MSK: denies joint swelling/pain, back pain, neck pain  Neuro: denies headache, dizziness, weakness, numbness  Psych: denies anxiety, depression, SI/HI, visual/auditory hallucinations Gen: +Fever, +Chills. Denies fatigue, weight loss  Skin: denies rashes, laceration, bruising  HEENT: +Throat discomfort. denies visual changes, ear pain, nasal congestion  Respiratory: denies DE LA CRUZ, SOB, cough, wheezing  Cardiovascular: denies chest pain, palpitations, diaphoresis, LE edema  GI: denies abdominal pain, n/v/d  : denies dysuria, frequency, urgency, bowel/bladder incontinence  MSK: denies joint swelling/pain, back pain, neck pain  Neuro: denies headache, dizziness, weakness, numbness  Psych: denies anxiety, depression, SI/HI, visual/auditory hallucinations.

## 2022-10-08 NOTE — ED PROVIDER NOTE - NS ED ATTENDING STATEMENT MOD
This was a shared visit with the SYLVESTER. I reviewed and verified the documentation and independently performed the documented:

## 2022-10-08 NOTE — ED PROVIDER NOTE - NSFOLLOWUPINSTRUCTIONS_ED_ALL_ED_FT
- Follow up with your doctor within 2-3 days.   - Take Tylenol (Acetaminophen) 650mg every 6 hours as needed for pain/fever  - Stay well hydrated.   - See attached COVID-19 instructions.   - Return to the ED for any new or worsening symptoms.     Viral Respiratory Infection    A viral respiratory infection is an illness that affects parts of the body used for breathing, like the lungs, nose, and throat. It is caused by a germ called a virus. Symptoms can include runny nose, coughing, sneezing, fatigue, body aches, sore throat, fever, or headache. Over the counter medicine can be used to manage the symptoms but the infection typically goes away on its own in 5 to 10 days.     SEEK IMMEDIATE MEDICAL CARE IF YOU HAVE ANY OF THE FOLLOWING SYMPTOMS: shortness of breath, chest pain, fever over 10 days, or lightheadedness/dizziness.

## 2022-10-08 NOTE — ED PROVIDER NOTE - PROGRESS NOTE DETAILS
pt received MAB, observed for 1 hour s/p injection without any symptoms or adverse reactions. pt feeling well, not hypoxic. return precautions discussed with patient and wife.

## 2022-10-08 NOTE — ED ADULT TRIAGE NOTE - CHIEF COMPLAINT QUOTE
sent here from [facility] for MAB infusion for COVID. Patient also complaining of left leg pain, states that he hit his leg.

## 2022-10-08 NOTE — CONSULT NOTE ADULT - ASSESSMENT
76y  Male with h/o COPD, LEILA (uses CPAP), DMII, CAD with PCI x 3, HTN, HLD, esophageal Ca s/p resection, cerebral aneurysm s/p coiling. Patient presents to ED for MAB infusion. States he was exposed to multiple COVID positive people this past week. He began to feel sick last night(10/7) reporting throat discomfort, chills and awoke with 102F fever this morning(10/8). Went to urgent care and tested positive for COVID and was told to come to ED for MAB infusion. Also reports posterolateral left lower leg pain, states his rottweiler puppy ran into his leg yesterday. Able to walk but painful when he steps with left leg. Denies cp, sob, palpitations, syncope, n/v/d, abd pain, dizziness, headache. In the ER patient is afebrile, not hypoxic. ID input requested.  76y  Male with h/o COPD, LEILA (uses CPAP), DMII, CAD with PCI x 3, HTN, HLD, esophageal Ca s/p resection, cerebral aneurysm s/p coiling. Patient presents to ED for MAB infusion. States he was exposed to multiple COVID positive people this past week. He began to feel sick last night(10/7) reporting throat discomfort, chills and awoke with 102F fever this morning(10/8). Went to urgent care and tested positive for COVID and was told to come to ED for MAB infusion. Also reports posterolateral left lower leg pain, states his rottweiler puppy ran into his leg yesterday. Able to walk but painful when he steps with left leg. Denies cp, sob, palpitations, syncope, n/v/d, abd pain, dizziness, headache. In the ER patient is afebrile, not hypoxic. ID input requested.       COVID-19 infection  fevers  shortness of breath      - COVID 19 PCR positive on 10/8/22  - symptoms started 10/7  - Continue supportive care measures  - Avoid antibiotics unless there is a concern for a bacterial infection  - May consider vitamin C, thiamine and zinc (note lack of evidence to support benefit with COVID 19)  - Discussed Bebtelovimab with the patient. Consent obtained. Approved by Dr Huang  - Bebtelovimab x1 dose  - Trend Fever  - Trend WBC      Thank you for allowing me to participate in the care of your patient.   Will Follow    d/w Dr Huang and Dr Riggins

## 2022-10-08 NOTE — ED PROVIDER NOTE - CLINICAL SUMMARY MEDICAL DECISION MAKING FREE TEXT BOX
75yo M presenting to ED sent in from urgent care for +covid infection. pt well appearing, NAD, no hypoxia. not candidate for paxlovid due to medication interactions. ID contacted for potential MAB injection

## 2022-10-08 NOTE — CONSULT NOTE ADULT - SUBJECTIVE AND OBJECTIVE BOX
[facility]  INFECTIOUS DISEASES at [place]  =======================================================                               Donta Whelan MD#   Alyssa Jones MD*                             Che Grimes MD*   Saida Nguyen MD*            Diplomates American Board of Internal Medicine & Infectious Diseases                # [place] Office - Appt - Tel  [phone] Fax [phone]                * [place] Office - Appt - Tel [phone] Fax [phone]                                  Hospital Consult line:  [phone]  =======================================================      [id number]  ALEJA JUÁREZ    CC: Patient is a 76y old  Male who presents with a chief complaint of COVID 19    76y  Male with h/o COPD, LEILA (uses CPAP), DMII, CAD with PCI x 3, HTN, HLD, esophageal Ca s/p resection, cerebral aneurysm s/p coiling. Patient presents to ED for MAB infusion. States he was exposed to multiple COVID positive people this past week. He began to feel sick last night(10/7) reporting throat discomfort, chills and awoke with 102F fever this morning(10/8). Went to urgent care and tested positive for COVID and was told to come to ED for MAB infusion. Also reports posterolateral left lower leg pain, states his rottweiler puppy ran into his leg yesterday. Able to walk but painful when he steps with left leg. Denies cp, sob, palpitations, syncope, n/v/d, abd pain, dizziness, headache. In the ER patient is afebrile, not hypoxic. ID input requested.       Past Medical & Surgical Hx:  Esophageal cancer  Hypertension  Emphysema  GERD (gastroesophageal reflux disease)  High cholesterol  Sleep apnea  Pneumonia  BPH (benign prostatic hyperplasia)  Diabetes mellitus  H/O carotid artery stenosis  LEILA on CPAP  History of cerebral aneurysm  CAD (coronary artery disease)  S/P hernia surgery  Umblical Hernia Repair  S/P colonoscopy  S/P colectomy  Right Hemicolectomy secondary to polyps.  History of esophagectomy  S/P coil embolization of cerebral aneurysm  H/O cardiac catheterization  Pci to RCA      Social Hx:  Former smoker       FAMILY HISTORY:  Family history of cancer (Sibling)  Colon Cancer - Brother  Lung Cancer - Brother  Family history of coronary artery disease (Sibling, Mother)  Family history of hypertension (Mother)      Allergies  No Known Allergies      REVIEW OF SYSTEMS:  CONSTITUTIONAL:  + Fever + chills  HEENT:  No diplopia or blurred vision.  No earache, sore throat or runny nose.  CARDIOVASCULAR:  No chest pain  RESPIRATORY:  No cough, shortness of breath  GASTROINTESTINAL:  No nausea, vomiting or diarrhea.  GENITOURINARY:  No dysuria, frequency or urgency. No Blood in urine  MUSCULOSKELETAL:  no joint aches, no muscle pain  SKIN:  No change in skin, hair or nails.  NEUROLOGIC:  No Headaches, seizures  PSYCHIATRIC:  No disorder of thought or mood.  ENDOCRINE:  No heat or cold intolerance  HEMATOLOGICAL:  No easy bruising or bleeding.       Physical Exam:  GEN: NAD  HEENT: normocephalic and atraumatic. EOMI. PERRL.    NECK: Supple.   LUNGS: CTA B/L.  HEART: RRR  ABDOMEN: Soft, NT, ND.  +BS.    : No CVA tenderness  EXTREMITIES: Without  edema.  MSK: No joint swelling  NEUROLOGIC: No Focal Deficits   PSYCHIATRIC: Appropriate affect .  SKIN: No rash      Height (cm): 172.7 (10-08 @ 12:48)  Weight (kg): 77.1 (10-08 @ 12:48)  BMI (kg/m2): 25.9 (10-08 @ 12:48)  BSA (m2): 1.91 (10-08 @ 12:48)      Vitals:  T(F): 98.1 (08 Oct 2022 12:48), Max: 98.1 (08 Oct 2022 12:48)  HR: 61 (08 Oct 2022 12:48)  BP: 124/73 (08 Oct 2022 12:48)  RR: 20 (08 Oct 2022 12:48)  SpO2: 96% (08 Oct 2022 12:48) (96% - 96%)  temp max in last 48H T(F): , Max: 98.1 (10-08-22 @ 12:48)                            [facility]  INFECTIOUS DISEASES at [place] [facility] [place]  =======================================================                               Donta Whelan MD#   Alyssa Jones MD*                             Che Grmies MD*   Saida Nguyen MD*            Diplomates American Board of Internal Medicine & Infectious Diseases                # [place] Office - Appt - Tel  [phone] Fax [phone]                * [place] Office - Appt - Tel [phone] Fax [phone]                                  Hospital Consult line:  [phone]  =======================================================      [id number]  ALEJA JUÁREZ    CC: Patient is a 76y old  Male who presents with a chief complaint of COVID 19    76y  Male with h/o COPD, LEILA (uses CPAP), DMII, CAD with PCI x 3, HTN, HLD, esophageal Ca s/p resection, cerebral aneurysm s/p coiling. Patient presents to ED for MAB infusion. States he was exposed to multiple COVID positive people this past week. He began to feel sick last night(10/7) reporting throat discomfort, chills and awoke with 102F fever this morning(10/8). Went to urgent care and tested positive for COVID and was told to come to ED for MAB infusion. Also reports posterolateral left lower leg pain, states his rottweiler puppy ran into his leg yesterday. Able to walk but painful when he steps with left leg. Denies cp, sob, palpitations, syncope, n/v/d, abd pain, dizziness, headache. In the ER patient is afebrile, not hypoxic. ID input requested.       Past Medical & Surgical Hx:  Esophageal cancer  Hypertension  Emphysema  GERD (gastroesophageal reflux disease)  High cholesterol  Sleep apnea  Pneumonia  BPH (benign prostatic hyperplasia)  Diabetes mellitus  H/O carotid artery stenosis  LEILA on CPAP  History of cerebral aneurysm  CAD (coronary artery disease)  S/P hernia surgery  Umblical Hernia Repair  S/P colonoscopy  S/P colectomy  Right Hemicolectomy secondary to polyps.  History of esophagectomy  S/P coil embolization of cerebral aneurysm  H/O cardiac catheterization  Pci to RCA      Social Hx:  Former smoker       FAMILY HISTORY:  Family history of cancer (Sibling)  Colon Cancer - Brother  Lung Cancer - Brother  Family history of coronary artery disease (Sibling, Mother)  Family history of hypertension (Mother)      Allergies  No Known Allergies      REVIEW OF SYSTEMS:  CONSTITUTIONAL:  + Fever + chills  HEENT:  No diplopia or blurred vision.  No earache, sore throat or runny nose.  CARDIOVASCULAR:  No chest pain  RESPIRATORY:  No cough, shortness of breath  GASTROINTESTINAL:  No nausea, vomiting or diarrhea.  GENITOURINARY:  No dysuria, frequency or urgency. No Blood in urine  MUSCULOSKELETAL:  no joint aches, no muscle pain  SKIN:  No change in skin, hair or nails.  NEUROLOGIC:  No Headaches, seizures  PSYCHIATRIC:  No disorder of thought or mood.  ENDOCRINE:  No heat or cold intolerance  HEMATOLOGICAL:  No easy bruising or bleeding.       Physical Exam:  GEN: NAD  HEENT: normocephalic and atraumatic. EOMI. PERRL.    NECK: Supple.   LUNGS: CTA B/L.  HEART: RRR  ABDOMEN: Soft, NT, ND.  +BS.    : No CVA tenderness  EXTREMITIES: Without  edema.  MSK: No joint swelling  NEUROLOGIC: No Focal Deficits   PSYCHIATRIC: Appropriate affect .  SKIN: No rash      Height (cm): 172.7 (10-08 @ 12:48)  Weight (kg): 77.1 (10-08 @ 12:48)  BMI (kg/m2): 25.9 (10-08 @ 12:48)  BSA (m2): 1.91 (10-08 @ 12:48)      Vitals:  T(F): 98.1 (08 Oct 2022 12:48), Max: 98.1 (08 Oct 2022 12:48)  HR: 61 (08 Oct 2022 12:48)  BP: 124/73 (08 Oct 2022 12:48)  RR: 20 (08 Oct 2022 12:48)  SpO2: 96% (08 Oct 2022 12:48) (96% - 96%)  temp max in last 48H T(F): , Max: 98.1 (10-08-22 @ 12:48)        Vitals:  T(F): 98.1 (08 Oct 2022 12:48), Max: 98.1 (08 Oct 2022 12:48)  HR: 61 (08 Oct 2022 12:48)  BP: 124/73 (08 Oct 2022 12:48)  RR: 20 (08 Oct 2022 12:48)  SpO2: 96% (08 Oct 2022 12:48) (96% - 96%)  temp max in last 48H T(F): , Max: 98.1 (10-08-22 @ 12:48)        SARS-CoV-2 Result: Detected (10-08-22 @ 13:20)

## 2022-10-08 NOTE — ED PROVIDER NOTE - OBJECTIVE STATEMENT
75yo pmhx COPD, LEILA (uses CPAP), DMII, CAD with PCI x 3, HTN, HLD, esophageal Ca s/p resection, cerebral aneurysm s/p coiling presents to ED for MAB infusion. States he was exposed to multiple covid positive people this past week, began to feel sick last night reporting throat discomfort, chills and awoke with 102 fever this morning. Went to urgent care and tested positive for covid, was told to come to ED for MAB infusion. Also reports posterolateral left lower leg pain, states his rottweiler puppy ran into his leg yesterday. Able to walk but painful when he steps with left leg. Denies cp, sob, palpitations, syncope, n/v/d, abd pain, dizziness, headache. 75 yo pmhx COPD, LEILA (uses CPAP), DMII, CAD with PCI x 3, HTN, HLD, esophageal Ca s/p resection, cerebral aneurysm s/p coiling presents to ED for MAB infusion. States he was exposed to multiple covid positive people this past week, began to feel sick last night reporting throat discomfort, chills and awoke with 102 fever this morning. Went to urgent care and tested positive for covid, was told to come to ED for MAB infusion. Also reports posterolateral left lower leg pain, states his rottweiler puppy ran into his leg yesterday. Able to walk but painful when he steps with left leg. Denies cp, sob, palpitations, syncope, n/v/d, abd pain, dizziness, headache.

## 2022-10-08 NOTE — ED PROVIDER NOTE - PHYSICAL EXAMINATION
Gen: No acute distress, non toxic  HENT: NCAT, Mucous membranes moist, Oropharynx without exudates, uvula midline  Eyes: pink conjunctivae, EOMI, PERRL  CV: RRR, nl s1/s2.  Resp: CTAB, normal rate and effort. O2 96% on room air  GI: Abdomen soft, NT, ND. No rebound, no guarding  : No CVAT  Neuro: A&O x 3, CN II-XII intact, sensorimotor intact without deficits   MSK: No spine or joint tenderness to palpation, Full ROM ext x 4. calves symmetric. +TTP left lower leg posterolateral mid fibula. FWB and ambulatory  Skin: No rashes. intact and perfused.

## 2022-10-09 PROCEDURE — 87637 SARSCOV2&INF A&B&RSV AMP PRB: CPT

## 2022-10-09 PROCEDURE — 96374 THER/PROPH/DIAG INJ IV PUSH: CPT

## 2022-10-09 PROCEDURE — 73590 X-RAY EXAM OF LOWER LEG: CPT

## 2022-10-09 PROCEDURE — 99284 EMERGENCY DEPT VISIT MOD MDM: CPT | Mod: 25

## 2022-12-01 ENCOUNTER — APPOINTMENT (OUTPATIENT)
Dept: INTERNAL MEDICINE | Facility: CLINIC | Age: 77
End: 2022-12-01

## 2022-12-01 VITALS
DIASTOLIC BLOOD PRESSURE: 78 MMHG | RESPIRATION RATE: 12 BRPM | WEIGHT: 173 LBS | BODY MASS INDEX: 26.22 KG/M2 | SYSTOLIC BLOOD PRESSURE: 140 MMHG | HEART RATE: 72 BPM | HEIGHT: 68 IN

## 2022-12-01 DIAGNOSIS — M79.662 PAIN IN LEFT LOWER LEG: ICD-10-CM

## 2022-12-01 PROCEDURE — 99214 OFFICE O/P EST MOD 30 MIN: CPT

## 2022-12-01 NOTE — HISTORY OF PRESENT ILLNESS
[FreeTextEntry1] : calf pain [de-identified] : patient with dm,  htn hld, pvc s/p esophageal cancer\par has been having calf pain after his dog ran into his leg\par he has no chest pain sob nvd or palpitatons\par he feels pain with bending but knee does not lock\par wants to got to [place] in [place]

## 2022-12-01 NOTE — ASSESSMENT
[FreeTextEntry1] : calf pain try steroids does not seem like dvt but more like calf strain\par bp stable\par dm stable\par lipid stable\par will get flu vaccine at [facility]

## 2022-12-01 NOTE — PHYSICAL EXAM
[No Acute Distress] : no acute distress [Well Nourished] : well nourished [Well Developed] : well developed [Well-Appearing] : well-appearing [Normal Sclera/Conjunctiva] : normal sclera/conjunctiva [PERRL] : pupils equal round and reactive to light [EOMI] : extraocular movements intact [Normal Outer Ear/Nose] : the outer ears and nose were normal in appearance [Normal Oropharynx] : the oropharynx was normal [No JVD] : no jugular venous distention [No Lymphadenopathy] : no lymphadenopathy [Supple] : supple [Thyroid Normal, No Nodules] : the thyroid was normal and there were no nodules present [No Respiratory Distress] : no respiratory distress  [No Accessory Muscle Use] : no accessory muscle use [Clear to Auscultation] : lungs were clear to auscultation bilaterally [Normal Rate] : normal rate  [Regular Rhythm] : with a regular rhythm [Normal S1, S2] : normal S1 and S2 [No Murmur] : no murmur heard [No Carotid Bruits] : no carotid bruits [No Abdominal Bruit] : a ~M bruit was not heard ~T in the abdomen [No Varicosities] : no varicosities [Pedal Pulses Present] : the pedal pulses are present [No Edema] : there was no peripheral edema [No Palpable Aorta] : no palpable aorta [No Extremity Clubbing/Cyanosis] : no extremity clubbing/cyanosis [Soft] : abdomen soft [Non Tender] : non-tender [Non-distended] : non-distended [No Masses] : no abdominal mass palpated [No HSM] : no HSM [Normal Bowel Sounds] : normal bowel sounds [Normal Posterior Cervical Nodes] : no posterior cervical lymphadenopathy [Normal Anterior Cervical Nodes] : no anterior cervical lymphadenopathy [No CVA Tenderness] : no CVA  tenderness [No Spinal Tenderness] : no spinal tenderness [No Rash] : no rash [Coordination Grossly Intact] : coordination grossly intact [No Focal Deficits] : no focal deficits [Normal Gait] : normal gait [Deep Tendon Reflexes (DTR)] : deep tendon reflexes were 2+ and symmetric [Normal Affect] : the affect was normal [Normal Insight/Judgement] : insight and judgment were intact [de-identified] : left calf pain, knee structurally intact

## 2022-12-02 ENCOUNTER — APPOINTMENT (OUTPATIENT)
Dept: ORTHOPEDIC SURGERY | Facility: CLINIC | Age: 77
End: 2022-12-02

## 2022-12-02 VITALS — WEIGHT: 173 LBS | HEIGHT: 68 IN | BODY MASS INDEX: 26.22 KG/M2

## 2022-12-02 PROCEDURE — 99204 OFFICE O/P NEW MOD 45 MIN: CPT

## 2022-12-02 PROCEDURE — 73564 X-RAY EXAM KNEE 4 OR MORE: CPT | Mod: LT

## 2022-12-02 NOTE — IMAGING
[de-identified] : The patient is a well appearing 77 year old male of their stated age.\par Patient ambulates with a normal gait.\par Negative straight leg raise bilateral\par \par Effected Knee:    LEFT                     	\par ROM:  0-125 degrees\par Lachman: Negative\par Pivot Shift: Negative\par Anterior Drawer: Negative\par Posterior Drawer / Sag:Negative\par Varus Stress 0 degrees: Stable\par Varus Stress 30 degrees: Stable\par Valgus Stress 0 degrees: Stable\par Valgus Stress 30 degrees: Stable\par Medial Myrna: Negative\par Lateral Myrna: Positive\par Patella Glide: 2+\par Patella Apprehension: Negative\par Patella Grind: Negative\par Palpation:\par Medial Joint Line: tender\par Lateral Joint Line: tender\par Medial Collateral Ligament: Nontender\par Lateral Collateral Ligament/PLC: Nontender\par Distal Femur: Nontender\par Proximal Tibia: Nontender\par Tibial Tubercle: Nontender\par Distal Pole Patella: Nontender\par Quadriceps Tendon: Nontender &  Intact\par Patella Tendon: Nontender &  Intact\par Medial Distal Hamstring/PES: Nontender\par Lateral Distal Hamstring: Nontender & Stable\par Iliotibial Band: Nontender\par Medial Patellofemoral Ligament: Nontender\par Adductor: Nontender\par Proximal GSC-Plantaris: Nontender\par Calf: Supple & Nontender\par Inspection:\par Deformity: No\par Erythema: No\par Ecchymosis: No\par Abrasions: No\par Effusion: +\par Prepatella Bursitis: No\par Neurologic Exam:\par Sensation L4-S1: Grossly Intact\par Motor Exam:\par Quadriceps: 5 out of 5\par Hamstrings: 5 out of 5\par EHL: 5 out of 5\par FHL: 5 out of 5\par TA: 5 out of 5\par GS: 5 out of 5\par Circulatory/Pulses:\par Dorsalis Pedis: 2+\par Posterior Tibialis: 2+\par Additional Pertinent Findings: None\par Contralateral Knee:                           	\par ROM: 0-145 degrees\par Other Pertinent Findings: None\par \par Assessment: The patient is a 77 year old male with left knee pain and locking and radiographic and physical exam findings consistent with lateral meniscus tear vs bone contusion.   \par The patient’s condition is acute.\par Documents/Results Reviewed Today: US calf negative per report\par Tests/Studies Independently Interpreted Today: Radiographs of left knee are benign\par Pertinent findings include: +LJLT/LMM\par Confounding medical conditions/concerns: Cardiac history\par Plan:  Limit activities.  MRI to eval for LMT\par Tests Ordered: MRI Left Knee\par Prescription Medications Ordered: None\par Braces/DME Ordered: None\par Activity/Work/Sports Status: Limited\par Additional Instructions: Avoid squatting\par Follow-Up: After MRI\par \par  [Left] : left knee [All Views] : anteroposterior, lateral, skyline, and anteroposterior standing [There are no fractures, subluxations or dislocations. No significant abnormalities are seen] : There are no fractures, subluxations or dislocations. No significant abnormalities are seen

## 2022-12-02 NOTE — HISTORY OF PRESENT ILLNESS
[de-identified] : The patient is a 77 year old right hand dominant male who presents today complaining of left knee.  \par Date of Injury/Onset: 3 weeks ago\par Pain:  At Rest: 5/10 \par With Activity:  7/10 \par Mechanism of injury: No specific mechanism of injury \par This is not a Work Related Injury being treated under Worker's Compensation.\par This is not an athletic injury occurring associated with an interscholastic or organized sports team.\par Quality of symptoms: Stiffness, decreased range of motion, pain in the front and back of the knee, worst in the mornings.  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

## 2022-12-08 ENCOUNTER — RESULT REVIEW (OUTPATIENT)
Age: 77
End: 2022-12-08

## 2022-12-15 ENCOUNTER — APPOINTMENT (OUTPATIENT)
Dept: ORTHOPEDIC SURGERY | Facility: CLINIC | Age: 77
End: 2022-12-15

## 2022-12-15 VITALS — HEIGHT: 68 IN | WEIGHT: 173 LBS | BODY MASS INDEX: 26.22 KG/M2

## 2022-12-15 DIAGNOSIS — M94.20 CHONDROMALACIA, UNSPECIFIED SITE: ICD-10-CM

## 2022-12-15 PROCEDURE — 20610 DRAIN/INJ JOINT/BURSA W/O US: CPT | Mod: LT

## 2022-12-15 PROCEDURE — J3490M: CUSTOM

## 2022-12-15 PROCEDURE — 99214 OFFICE O/P EST MOD 30 MIN: CPT | Mod: 25

## 2022-12-16 PROBLEM — M94.20 CHONDROMALACIA: Status: ACTIVE | Noted: 2022-12-15

## 2022-12-22 ENCOUNTER — APPOINTMENT (OUTPATIENT)
Dept: INTERNAL MEDICINE | Facility: CLINIC | Age: 77
End: 2022-12-22

## 2022-12-22 VITALS
HEART RATE: 16 BPM | SYSTOLIC BLOOD PRESSURE: 130 MMHG | DIASTOLIC BLOOD PRESSURE: 72 MMHG | HEIGHT: 68 IN | BODY MASS INDEX: 26.07 KG/M2 | WEIGHT: 172 LBS | RESPIRATION RATE: 12 BRPM

## 2022-12-22 DIAGNOSIS — M79.2 NEURALGIA AND NEURITIS, UNSPECIFIED: ICD-10-CM

## 2022-12-22 DIAGNOSIS — B02.9 ZOSTER W/OUT COMPLICATIONS: ICD-10-CM

## 2022-12-22 PROCEDURE — 99214 OFFICE O/P EST MOD 30 MIN: CPT

## 2022-12-22 NOTE — HISTORY OF PRESENT ILLNESS
[de-identified] : The patient is a 77 year old right hand dominant male who presents today complaining of left knee pain and test follow up.  \par Date of Injury/Onset: 11/2022\par Pain:  At Rest: 5/10 \par With Activity:  7/10 \par Mechanism of injury: No specific mechanism of injury \par This is not a Work Related Injury being treated under Worker's Compensation.\par This is not an athletic injury occurring associated with an interscholastic or organized sports team.\par Quality of symptoms: Stiffness, decreased range of motion, pain in the front and back of the knee, worst in the mornings.  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

## 2022-12-22 NOTE — PHYSICAL EXAM
[No Acute Distress] : no acute distress [Well Nourished] : well nourished [Well Developed] : well developed [Well-Appearing] : well-appearing [Normal Sclera/Conjunctiva] : normal sclera/conjunctiva [PERRL] : pupils equal round and reactive to light [EOMI] : extraocular movements intact [Normal Outer Ear/Nose] : the outer ears and nose were normal in appearance [Normal Oropharynx] : the oropharynx was normal [No JVD] : no jugular venous distention [No Lymphadenopathy] : no lymphadenopathy [Supple] : supple [Thyroid Normal, No Nodules] : the thyroid was normal and there were no nodules present [No Respiratory Distress] : no respiratory distress  [No Accessory Muscle Use] : no accessory muscle use [Clear to Auscultation] : lungs were clear to auscultation bilaterally [Normal Rate] : normal rate  [Regular Rhythm] : with a regular rhythm [Normal S1, S2] : normal S1 and S2 [No Murmur] : no murmur heard [No Carotid Bruits] : no carotid bruits [No Abdominal Bruit] : a ~M bruit was not heard ~T in the abdomen [No Varicosities] : no varicosities [Pedal Pulses Present] : the pedal pulses are present [No Edema] : there was no peripheral edema [No Palpable Aorta] : no palpable aorta [No Extremity Clubbing/Cyanosis] : no extremity clubbing/cyanosis [Soft] : abdomen soft [Non Tender] : non-tender [Non-distended] : non-distended [No Masses] : no abdominal mass palpated [No HSM] : no HSM [Normal Bowel Sounds] : normal bowel sounds [Normal Posterior Cervical Nodes] : no posterior cervical lymphadenopathy [Normal Anterior Cervical Nodes] : no anterior cervical lymphadenopathy [No CVA Tenderness] : no CVA  tenderness [No Spinal Tenderness] : no spinal tenderness [No Joint Swelling] : no joint swelling [Grossly Normal Strength/Tone] : grossly normal strength/tone [No Rash] : no rash [Coordination Grossly Intact] : coordination grossly intact [No Focal Deficits] : no focal deficits [Normal Gait] : normal gait [Deep Tendon Reflexes (DTR)] : deep tendon reflexes were 2+ and symmetric [Normal Affect] : the affect was normal [Normal Insight/Judgement] : insight and judgment were intact [de-identified] : rash right face

## 2022-12-22 NOTE — HISTORY OF PRESENT ILLNESS
[FreeTextEntry8] : right rash face\par had dental work yesterday\par ear hurts face hurts\par no fever no sob\par no nvd\par getting knee surgery in January

## 2022-12-22 NOTE — IMAGING
[Left] : left knee [All Views] : anteroposterior, lateral, skyline, and anteroposterior standing [There are no fractures, subluxations or dislocations. No significant abnormalities are seen] : There are no fractures, subluxations or dislocations. No significant abnormalities are seen [de-identified] : The patient is a well appearing 77 year old male of their stated age.\par Patient ambulates with a normal gait.\par Negative straight leg raise bilateral\par \par Effected Knee:    LEFT                     	\par ROM:  0-125 degrees\par Lachman: Negative\par Pivot Shift: Negative\par Anterior Drawer: Negative\par Posterior Drawer / Sag:Negative\par Varus Stress 0 degrees: Stable\par Varus Stress 30 degrees: Stable\par Valgus Stress 0 degrees: Stable\par Valgus Stress 30 degrees: Stable\par Medial Myrna: Negative\par Lateral Myrna: Positive\par Patella Glide: 2+\par Patella Apprehension: Negative\par Patella Grind: Negative\par Palpation:\par Medial Joint Line: tender\par Lateral Joint Line: tender\par Medial Collateral Ligament: Nontender\par Lateral Collateral Ligament/PLC: Nontender\par Distal Femur: Nontender\par Proximal Tibia: Nontender\par Tibial Tubercle: Nontender\par Distal Pole Patella: Nontender\par Quadriceps Tendon: Nontender &  Intact\par Patella Tendon: Nontender &  Intact\par Medial Distal Hamstring/PES: Nontender\par Lateral Distal Hamstring: Nontender & Stable\par Iliotibial Band: Nontender\par Medial Patellofemoral Ligament: Nontender\par Adductor: Nontender\par Proximal GSC-Plantaris: Nontender\par Calf: Supple & Nontender\par Inspection:\par Deformity: No\par Erythema: No\par Ecchymosis: No\par Abrasions: No\par Effusion: +\par Prepatella Bursitis: No\par Neurologic Exam:\par Sensation L4-S1: Grossly Intact\par Motor Exam:\par Quadriceps: 5 out of 5\par Hamstrings: 5 out of 5\par EHL: 5 out of 5\par FHL: 5 out of 5\par TA: 5 out of 5\par GS: 5 out of 5\par Circulatory/Pulses:\par Dorsalis Pedis: 2+\par Posterior Tibialis: 2+\par Additional Pertinent Findings: None\par Contralateral Knee:                           	\par ROM: 0-145 degrees\par Other Pertinent Findings: None\par \par Assessment: The patient is a 77 year old male with left knee pain and locking and radiographic and physical exam findings consistent with MMT and LMT\par The patient’s condition is acute.\par Documents/Results Reviewed Today: MRI left knee \par Tests/Studies Independently Interpreted Today: MRI left knee reveals vertical longitudinal tear with large unstable flap to the posterior horn and body of medial meniscus, chondromalacia of the medial femoral condyle, and tear to the anterior horn and body of the lateral meniscus \par Pertinent findings include: +LJLT/LMM\par Confounding medical conditions/concerns: Cardiac history (Stent placement; two placed 2 years ago, and one 6 months ago)\par \par Plan: Discussed operative versus nonoperative management. Patient denies surgical intervention at this time. Further discussed conservative treatments in the form of injections. Patient elected to receive left knee 9/1 CSI. Patient will start physical therapy, HEP, and stretching. Take OTC antiinflammatories as needed - use as directed. Modify activity as discussed. \par Tests Ordered: None \par Prescription Medications Ordered: None\par Braces/DME Ordered: None\par Activity/Work/Sports Status: Limited\par Additional Instructions: Avoid squatting\par Follow-Up: 6 weeks \par \par Procedure Note: Musculoskeletal Injection \par Diagnosis:  left knee MMT and LMT \par Procedure:  left knee, superolateral, 9/1 CSI\par \par Indication:  The patient has had persistent pain despite conservative treatment.  Risks, benefits and alternatives to procedure were discussed; all questions were answered to the patient's apparent satisfaction and informed consent obtained.  The patient denied prior problems with local anesthetics, injectable cortisones, chicken allergy, coagulopathy and no relevant drug or preservative allergies or sensitivities.\par \par The area of injection was prepared in a sterile fashion.  Prior to injection a 'Time Out' was conducted in accordance with [facility] policy and the site and nature of procedure verified with the patient. \par \par Procedure: \par The procedure was carried out utilizing sterile technique from a superolateral arthroscopic portal position. \par \par 0cc of clear synovial fluid was aspirated. \par The specimen: \par (X) appeared benign and was discarded \par ( ) was sent for Culture / Cell Count / Crystal analysis / [_].] \par \par Injection into the target area with care taken to aspirate frequently to minimize the risk of intravascular injection was performed with: \par ( ) 1cc of Depomedrol (80mg/ml) \par (X) 1cc of Dexamethasone (10mg/ml) \par ( ) 1cc of Toradol (30mg/ml) \par (X) 9cc of 0.5% Bupivacaine \par ( ) 1cc of 1% Lidocaine \par ( ) 5cc of 32mg Zilretta, prepared and diluted per  instructions \par ( ) 2 cc of Hylan G-F 20 (Synvisc) 16mg/2ml \par ( ) 6 cc of Hylan G-F 20 (SynvisoOne) 16mg/2ml \par ( ) 2cc of Euflexxa \par ( ) 2cc of Orthovisc \par ( ) 2cc of GelOne \par ( ) 3cc of Durolane (20mg/ml) \par \par Patient tolerated the procedure well and direct pressure was applied for hemostasis. The patient was reminded of potential post-injection risks including, but not limited to, delayed hypersensitivity reactions and/or infection.  The patient verified that they had the office and the Emergency Room's contact information if any problems should arise.  After several minutes, the patient informed me that they felt fine and was released from the office. \par \par \par The patient's current medication management of their orthopedic diagnosis was reviewed today:\par (1) We discussed a comprehensive treatment plan that included possible pharmaceutical management involving the use of prescription strength medications including but not limited to options such as oral Naprosyn 500mg BID, once daily Meloxicam 15 mg, or 500-650 mg Tylenol versus over the counter oral medications and topical prescription NSAID Pennsaid vs over the counter Voltaren gel.\par (2) There is a moderate risk of morbidity with further treatment, especially from use of prescription strength medications and possible side effects of these medications which include upset stomach with oral medications, skin reactions to topical medications and cardiac/renal issues with long term use.\par (3) I recommended that the patient follow-up with their medical physician to discuss any significant specific potential issues with long term medication use such as interactions with current medications or with exacerbation of underlying medical comorbidities.\par (4) The benefits and risks associated with use of injectable, oral or topical, prescription and over the counter anti-inflammatory medications were discussed with the patient. The patient voiced understanding of the risks including but not limited to bleeding, stroke, kidney dysfunction, heart disease, and were referred to the black box warning label for further information.\par \par I, Kiarra Abrams, attest that this documentation has been prepared under the direction and in the presence of Provider Dr. Jhon Lamb.\par \par The documentation recorded by the scribe accurately reflects the service I personally performed and the decisions made by me.\par The patient was seen by me under the direct supervision of Dr. Jhon Lamb.\par

## 2022-12-22 NOTE — ASSESSMENT
[FreeTextEntry1] : shingles neuralgia\par valtrex\par gabapentin\par neuralgia\par shoud be fine if goes to eye will need to go to sight md\par patient reassured\par dm stable\par ashd stable\par

## 2022-12-22 NOTE — ADDENDUM
[FreeTextEntry1] : The patient has continued to have pain and locking and is unable to ambulate without significant pain.  He has decided to delay his trip to [place], and elects for surgical intervention.  He is indicated for left knee arthroscopic partial medial and lateral meniscectomies and any indicated procedures.\par \par Consent:  Conservative treatment, nontreatment, nonsurgical intervention and surgical intervention treatment options have been reviewed with the patient.  The patient continues to be symptomatic and has failed conservative treatment, and elects to move forward with surgical intervention.  The patient is indicated for left knee arthroscopic partial medial and lateral meniscectomies and all indicated procedures. As such the alternatives, benefits and risks, of the above procedure, including but not limited to bleeding, infection, neurovascular injury, loss of limb, loss of life,  DVT, PE, RSD, inability to return to previous level of activity, inability to return to previous level of employment, advancement of or to osteoarthritic changes, joint instability or motion loss, hardware failure or migration, failure to resolve all symptoms, failure to return to sports and need for further procedures, as well as specific risk of need for future joint arthroplasty were discussed with the patient and/or their legal guardian who agreed to move forward with surgical intervention.  They have reviewed and signed the consent form today after expressing understanding of the above documented conversation. The patient or their representative will contact my office as instructed on the preoperative instruction sheet they received today to schedule surgery in a timely manner as discussed.\par Over 25 minutes were spent on this encounter including time with the patient and over 15 minutes spent on counseling and coordination of care.\par \par

## 2022-12-22 NOTE — DATA REVIEWED
[MRI] : MRI [Left] : left [Knee] : knee [Report was reviewed and noted in the chart] : The report was reviewed and noted in the chart [I independently reviewed and interpreted images and report] : I independently reviewed and interpreted images and report [I reviewed the films/CD and additionally noted] : I reviewed the films/CD and additionally noted [FreeTextEntry1] : vertical longitudinal tear with large unstable flap to the posterior horn and body of medial meniscus, chondromalacia of the medial femoral condyle, and tear to the anterior horn and body of the lateral meniscus

## 2023-01-03 ENCOUNTER — NON-APPOINTMENT (OUTPATIENT)
Age: 78
End: 2023-01-03

## 2023-01-03 ENCOUNTER — APPOINTMENT (OUTPATIENT)
Dept: INTERNAL MEDICINE | Facility: CLINIC | Age: 78
End: 2023-01-03
Payer: MEDICARE

## 2023-01-03 VITALS
BODY MASS INDEX: 26.07 KG/M2 | SYSTOLIC BLOOD PRESSURE: 130 MMHG | WEIGHT: 172 LBS | RESPIRATION RATE: 12 BRPM | HEIGHT: 68 IN | HEART RATE: 78 BPM | DIASTOLIC BLOOD PRESSURE: 82 MMHG

## 2023-01-03 DIAGNOSIS — Z01.818 ENCOUNTER FOR OTHER PREPROCEDURAL EXAMINATION: ICD-10-CM

## 2023-01-03 LAB
ANION GAP SERPL CALC-SCNC: 12 MMOL/L
BASOPHILS # BLD AUTO: 0.04 K/UL
BASOPHILS NFR BLD AUTO: 0.7 %
BUN SERPL-MCNC: 14 MG/DL
CALCIUM SERPL-MCNC: 9.2 MG/DL
CHLORIDE SERPL-SCNC: 104 MMOL/L
CO2 SERPL-SCNC: 25 MMOL/L
CREAT SERPL-MCNC: 0.97 MG/DL
EGFR: 81 ML/MIN/1.73M2
EOSINOPHIL # BLD AUTO: 0.13 K/UL
EOSINOPHIL NFR BLD AUTO: 2.2 %
FERRITIN SERPL-MCNC: 70 NG/ML
GLUCOSE SERPL-MCNC: 148 MG/DL
HCT VFR BLD CALC: 43.2 %
HGB BLD-MCNC: 14.4 G/DL
IMM GRANULOCYTES NFR BLD AUTO: 0.2 %
LYMPHOCYTES # BLD AUTO: 1.44 K/UL
LYMPHOCYTES NFR BLD AUTO: 24.2 %
MAN DIFF?: NORMAL
MCHC RBC-ENTMCNC: 31.8 PG
MCHC RBC-ENTMCNC: 33.3 GM/DL
MCV RBC AUTO: 95.4 FL
MONOCYTES # BLD AUTO: 0.53 K/UL
MONOCYTES NFR BLD AUTO: 8.9 %
NEUTROPHILS # BLD AUTO: 3.81 K/UL
NEUTROPHILS NFR BLD AUTO: 63.8 %
PLATELET # BLD AUTO: 260 K/UL
POTASSIUM SERPL-SCNC: 4.5 MMOL/L
RBC # BLD: 4.53 M/UL
RBC # FLD: 14.4 %
SODIUM SERPL-SCNC: 141 MMOL/L
WBC # FLD AUTO: 5.96 K/UL

## 2023-01-03 PROCEDURE — 99214 OFFICE O/P EST MOD 30 MIN: CPT | Mod: 25

## 2023-01-03 PROCEDURE — 93000 ELECTROCARDIOGRAM COMPLETE: CPT

## 2023-01-03 PROCEDURE — 36415 COLL VENOUS BLD VENIPUNCTURE: CPT

## 2023-01-03 RX ORDER — CLOPIDOGREL BISULFATE 75 MG/1
75 TABLET, FILM COATED ORAL DAILY
Qty: 90 | Refills: 3 | Status: ACTIVE | COMMUNITY
Start: 2023-01-03

## 2023-01-03 NOTE — RESULTS/DATA
[] : results reviewed [de-identified] : gluc 148  nonfadtibg [de-identified] : nsr nonspecific t wave changes old changes

## 2023-01-03 NOTE — ASSESSMENT
[Patient Optimized for Surgery] : Patient optimized for surgery [No Further Testing Recommended] : no further testing recommended [FreeTextEntry4] : Patient with multiple co morbidities but is in otherwise stable health.  He can proceed with planned surgery as it is minimally invasive.\par Spent 30 minutes with the patient given complex medical conditions [FreeTextEntry7] : continue asa but hold plavix 5 days before procedure

## 2023-01-03 NOTE — HISTORY OF PRESENT ILLNESS
[Coronary Artery Disease] : coronary artery disease [COPD] : COPD [No Adverse Anesthesia Reaction] : no adverse anesthesia reaction in self or family member [Aortic Stenosis] : no aortic stenosis [Atrial Fibrillation] : no atrial fibrillation [Recent Myocardial Infarction] : no recent myocardial infarction [Implantable Device/Pacemaker] : no implantable device/pacemaker [Asthma] : no asthma [Sleep Apnea] : no sleep apnea [Smoker] : not a smoker [Chronic Anticoagulation] : no chronic anticoagulation [Chronic Kidney Disease] : no chronic kidney disease [Diabetes] : no diabetes [FreeTextEntry1] : Left Meniscus Surgery [FreeTextEntry2] : 1/6/2023 [FreeTextEntry3] : Dr. Lamb [FreeTextEntry4] : 77 year old with ashd, copd, recent shingles, history of esophagal cancer who will undergo \par left meniscus surgery. He has been in stable health since last visit but does have have some neuralgia associated with shingles

## 2023-01-04 ENCOUNTER — TRANSCRIPTION ENCOUNTER (OUTPATIENT)
Age: 78
End: 2023-01-04

## 2023-01-06 ENCOUNTER — TRANSCRIPTION ENCOUNTER (OUTPATIENT)
Age: 78
End: 2023-01-06

## 2023-01-06 ENCOUNTER — OUTPATIENT (OUTPATIENT)
Dept: INPATIENT UNIT | Facility: HOSPITAL | Age: 78
LOS: 1 days | Discharge: HOME | End: 2023-01-06
Payer: MEDICARE

## 2023-01-06 ENCOUNTER — APPOINTMENT (OUTPATIENT)
Dept: ORTHOPEDIC SURGERY | Facility: HOSPITAL | Age: 78
End: 2023-01-06
Payer: MEDICARE

## 2023-01-06 VITALS
HEART RATE: 64 BPM | DIASTOLIC BLOOD PRESSURE: 81 MMHG | SYSTOLIC BLOOD PRESSURE: 136 MMHG | RESPIRATION RATE: 16 BRPM | HEIGHT: 68 IN | TEMPERATURE: 98 F | WEIGHT: 171.96 LBS

## 2023-01-06 VITALS
OXYGEN SATURATION: 98 % | SYSTOLIC BLOOD PRESSURE: 144 MMHG | HEART RATE: 66 BPM | RESPIRATION RATE: 16 BRPM | DIASTOLIC BLOOD PRESSURE: 64 MMHG | TEMPERATURE: 98 F

## 2023-01-06 DIAGNOSIS — Z98.89 OTHER SPECIFIED POSTPROCEDURAL STATES: Chronic | ICD-10-CM

## 2023-01-06 DIAGNOSIS — Z98.890 OTHER SPECIFIED POSTPROCEDURAL STATES: Chronic | ICD-10-CM

## 2023-01-06 DIAGNOSIS — S82.422A DISPLACED TRANSVERSE FRACTURE OF SHAFT OF LEFT FIBULA, INITIAL ENCOUNTER FOR CLOSED FRACTURE: ICD-10-CM

## 2023-01-06 DIAGNOSIS — S83.282A OTHER TEAR OF LATERAL MENISCUS, CURRENT INJURY, LEFT KNEE, INITIAL ENCOUNTER: ICD-10-CM

## 2023-01-06 PROCEDURE — 29880 ARTHRS KNE SRG MNISECTMY M&L: CPT | Mod: AS,LT

## 2023-01-06 PROCEDURE — 29880 ARTHRS KNE SRG MNISECTMY M&L: CPT | Mod: LT

## 2023-01-06 RX ORDER — FENTANYL CITRATE 50 UG/ML
50 INJECTION INTRAVENOUS
Refills: 0 | Status: DISCONTINUED | OUTPATIENT
Start: 2023-01-06 | End: 2023-01-06

## 2023-01-06 RX ORDER — METFORMIN HYDROCHLORIDE 850 MG/1
1 TABLET ORAL
Qty: 0 | Refills: 0 | DISCHARGE

## 2023-01-06 RX ORDER — ATORVASTATIN CALCIUM 80 MG/1
1 TABLET, FILM COATED ORAL
Qty: 0 | Refills: 4 | DISCHARGE

## 2023-01-06 RX ORDER — MULTIVIT-MIN/FERROUS GLUCONATE 9 MG/15 ML
1 LIQUID (ML) ORAL
Qty: 0 | Refills: 0 | DISCHARGE

## 2023-01-06 RX ORDER — RAMIPRIL 5 MG
2 CAPSULE ORAL
Qty: 0 | Refills: 0 | DISCHARGE

## 2023-01-06 RX ORDER — GABAPENTIN 400 MG/1
1 CAPSULE ORAL
Qty: 0 | Refills: 0 | DISCHARGE

## 2023-01-06 RX ORDER — FAMOTIDINE 10 MG/ML
0 INJECTION INTRAVENOUS
Qty: 0 | Refills: 0 | DISCHARGE

## 2023-01-06 RX ORDER — PANTOPRAZOLE SODIUM 20 MG/1
1 TABLET, DELAYED RELEASE ORAL
Qty: 0 | Refills: 0 | DISCHARGE

## 2023-01-06 RX ORDER — METOPROLOL TARTRATE 50 MG
0.5 TABLET ORAL
Qty: 0 | Refills: 0 | DISCHARGE

## 2023-01-06 RX ORDER — SODIUM CHLORIDE 9 MG/ML
1000 INJECTION, SOLUTION INTRAVENOUS
Refills: 0 | Status: DISCONTINUED | OUTPATIENT
Start: 2023-01-06 | End: 2023-01-06

## 2023-01-06 RX ORDER — TAMSULOSIN HYDROCHLORIDE 0.4 MG/1
2 CAPSULE ORAL
Qty: 0 | Refills: 0 | DISCHARGE

## 2023-01-06 RX ORDER — OXYCODONE HYDROCHLORIDE 5 MG/1
5 TABLET ORAL ONCE
Refills: 0 | Status: DISCONTINUED | OUTPATIENT
Start: 2023-01-06 | End: 2023-01-06

## 2023-01-06 RX ORDER — BUDESONIDE AND FORMOTEROL FUMARATE DIHYDRATE 160; 4.5 UG/1; UG/1
2 AEROSOL RESPIRATORY (INHALATION)
Qty: 0 | Refills: 0 | DISCHARGE

## 2023-01-06 RX ORDER — HYDROCODONE BITARTRATE AND ACETAMINOPHEN 7.5; 325 MG/15ML; MG/15ML
1 SOLUTION ORAL
Qty: 0 | Refills: 0 | DISCHARGE

## 2023-01-06 RX ORDER — AMLODIPINE BESYLATE 2.5 MG/1
1 TABLET ORAL
Qty: 0 | Refills: 0 | DISCHARGE

## 2023-01-06 RX ORDER — ONDANSETRON 8 MG/1
4 TABLET, FILM COATED ORAL ONCE
Refills: 0 | Status: DISCONTINUED | OUTPATIENT
Start: 2023-01-06 | End: 2023-01-06

## 2023-01-06 RX ORDER — ASPIRIN/CALCIUM CARB/MAGNESIUM 324 MG
1 TABLET ORAL
Qty: 0 | Refills: 0 | DISCHARGE

## 2023-01-06 NOTE — ASU DISCHARGE PLAN (ADULT/PEDIATRIC) - NS MD DC FALL RISK RISK
For information on Fall & Injury Prevention, visit: [email or web address] OR  [email or web address] OR  https://www.cdc.gov/steadi/patient.html

## 2023-01-06 NOTE — BRIEF OPERATIVE NOTE - NSICDXBRIEFPOSTOP_GEN_ALL_CORE_FT
POST-OP DIAGNOSIS:  Medial meniscus tear 06-Jan-2023 11:21:17  Juanito Garcia  Lateral meniscus tear 06-Jan-2023 11:21:12  Juanito Garcia

## 2023-01-06 NOTE — ASU DISCHARGE PLAN (ADULT/PEDIATRIC) - ASU DC SPECIAL INSTRUCTIONSFT
WBAT  PT within 24-48 hours   Follow instructions provided by your surgeon in your postoperative instruction packet

## 2023-01-06 NOTE — BRIEF OPERATIVE NOTE - NSICDXBRIEFPROCEDURE_GEN_ALL_CORE_FT
PROCEDURES:  Medial meniscectomy 06-Jan-2023 11:21:50  Juanito Garcia  Lateral meniscectomy 06-Jan-2023 11:22:03  Juanito Garcia

## 2023-01-12 DIAGNOSIS — I25.5 ISCHEMIC CARDIOMYOPATHY: ICD-10-CM

## 2023-01-12 DIAGNOSIS — M23.242 DERANGEMENT OF ANTERIOR HORN OF LATERAL MENISCUS DUE TO OLD TEAR OR INJURY, LEFT KNEE: ICD-10-CM

## 2023-01-12 DIAGNOSIS — I10 ESSENTIAL (PRIMARY) HYPERTENSION: ICD-10-CM

## 2023-01-12 DIAGNOSIS — E78.5 HYPERLIPIDEMIA, UNSPECIFIED: ICD-10-CM

## 2023-01-12 DIAGNOSIS — E11.9 TYPE 2 DIABETES MELLITUS WITHOUT COMPLICATIONS: ICD-10-CM

## 2023-01-12 DIAGNOSIS — Z79.82 LONG TERM (CURRENT) USE OF ASPIRIN: ICD-10-CM

## 2023-01-12 DIAGNOSIS — Z79.84 LONG TERM (CURRENT) USE OF ORAL HYPOGLYCEMIC DRUGS: ICD-10-CM

## 2023-01-12 DIAGNOSIS — M23.204 DERANGEMENT OF UNSPECIFIED MEDIAL MENISCUS DUE TO OLD TEAR OR INJURY, LEFT KNEE: ICD-10-CM

## 2023-01-12 DIAGNOSIS — Z85.01 PERSONAL HISTORY OF MALIGNANT NEOPLASM OF ESOPHAGUS: ICD-10-CM

## 2023-01-12 DIAGNOSIS — G47.33 OBSTRUCTIVE SLEEP APNEA (ADULT) (PEDIATRIC): ICD-10-CM

## 2023-01-12 DIAGNOSIS — M23.222 DERANGEMENT OF POSTERIOR HORN OF MEDIAL MENISCUS DUE TO OLD TEAR OR INJURY, LEFT KNEE: ICD-10-CM

## 2023-01-12 DIAGNOSIS — Z85.828 PERSONAL HISTORY OF OTHER MALIGNANT NEOPLASM OF SKIN: ICD-10-CM

## 2023-01-12 DIAGNOSIS — I25.10 ATHEROSCLEROTIC HEART DISEASE OF NATIVE CORONARY ARTERY WITHOUT ANGINA PECTORIS: ICD-10-CM

## 2023-01-12 DIAGNOSIS — Z79.02 LONG TERM (CURRENT) USE OF ANTITHROMBOTICS/ANTIPLATELETS: ICD-10-CM

## 2023-01-12 DIAGNOSIS — M23.252 DERANGEMENT OF POSTERIOR HORN OF LATERAL MENISCUS DUE TO OLD TEAR OR INJURY, LEFT KNEE: ICD-10-CM

## 2023-01-12 DIAGNOSIS — J44.9 CHRONIC OBSTRUCTIVE PULMONARY DISEASE, UNSPECIFIED: ICD-10-CM

## 2023-01-12 DIAGNOSIS — Z87.891 PERSONAL HISTORY OF NICOTINE DEPENDENCE: ICD-10-CM

## 2023-01-19 ENCOUNTER — OFFICE (OUTPATIENT)
Dept: URBAN - METROPOLITAN AREA CLINIC 100 | Facility: CLINIC | Age: 78
Setting detail: OPHTHALMOLOGY
End: 2023-01-19
Payer: MEDICARE

## 2023-01-19 ENCOUNTER — APPOINTMENT (OUTPATIENT)
Dept: ORTHOPEDIC SURGERY | Facility: CLINIC | Age: 78
End: 2023-01-19
Payer: MEDICARE

## 2023-01-19 DIAGNOSIS — B02.1: ICD-10-CM

## 2023-01-19 DIAGNOSIS — H52.4: ICD-10-CM

## 2023-01-19 DIAGNOSIS — H25.13: ICD-10-CM

## 2023-01-19 DIAGNOSIS — S83.282A OTHER TEAR OF LATERAL MENISCUS, CURRENT INJURY, LEFT KNEE, INITIAL ENCOUNTER: ICD-10-CM

## 2023-01-19 DIAGNOSIS — H59.811: ICD-10-CM

## 2023-01-19 DIAGNOSIS — S83.242A OTHER TEAR OF MEDIAL MENISCUS, CURRENT INJURY, LEFT KNEE, INITIAL ENCOUNTER: ICD-10-CM

## 2023-01-19 PROBLEM — H52.7 REFRACTIVE ERROR: Status: ACTIVE | Noted: 2023-01-19

## 2023-01-19 PROCEDURE — 99024 POSTOP FOLLOW-UP VISIT: CPT

## 2023-01-19 PROCEDURE — 92004 COMPRE OPH EXAM NEW PT 1/>: CPT | Performed by: OPHTHALMOLOGY

## 2023-01-19 PROCEDURE — 92015 DETERMINE REFRACTIVE STATE: CPT | Performed by: OPHTHALMOLOGY

## 2023-01-19 ASSESSMENT — REFRACTION_MANIFEST
OD_SPHERE: +3.00
OD_CYLINDER: -1.50
OD_SPHERE: +3.00
OD_ADD: +2.75
OS_CYLINDER: -1.50
OD_AXIS: 080
OS_AXIS: 100
OS_VA1: 20/25
OS_ADD: +2.75
OS_SPHERE: +2.25
OS_VA1: 20/25
OS_AXIS: 100
OS_SPHERE: +2.25
OD_AXIS: 080
OD_CYLINDER: -1.50
OS_CYLINDER: -1.50

## 2023-01-19 ASSESSMENT — KERATOMETRY
OD_AXISANGLE_DEGREES: 160
OD_K1POWER_DIOPTERS: 44.50
OS_AXISANGLE_DEGREES: 054
OS_K2POWER_DIOPTERS: 44.75
OS_K1POWER_DIOPTERS: 44.50
OD_K2POWER_DIOPTERS: 45.00

## 2023-01-19 ASSESSMENT — VISUAL ACUITY
OD_BCVA: 20/40-1
OS_BCVA: 20/20-3

## 2023-01-19 ASSESSMENT — AXIALLENGTH_DERIVED
OD_AL: 22.3277
OS_AL: 22.7248
OD_AL: 22.3277
OD_AL: 22.3277
OS_AL: 22.6348
OS_AL: 22.6348

## 2023-01-19 ASSESSMENT — REFRACTION_CURRENTRX
OD_SPHERE: +2.50
OD_AXIS: 074
OS_AXIS: 114
OS_ADD: +2.50
OD_VPRISM_DIRECTION: SV
OD_ADD: +2.50
OS_OVR_VA: 20/
OD_CYLINDER: -1.00
OS_SPHERE: +2.25
OS_VPRISM_DIRECTION: SV
OD_OVR_VA: 20/
OS_CYLINDER: -1.50

## 2023-01-19 ASSESSMENT — SPHEQUIV_DERIVED
OD_SPHEQUIV: 2.25
OS_SPHEQUIV: 1.5
OS_SPHEQUIV: 1.5
OD_SPHEQUIV: 2.25
OS_SPHEQUIV: 1.25
OD_SPHEQUIV: 2.25

## 2023-01-19 ASSESSMENT — REFRACTION_AUTOREFRACTION
OS_SPHERE: +2.00
OD_SPHERE: +3.00
OD_CYLINDER: -1.50
OD_AXIS: 077
OS_AXIS: 098
OS_CYLINDER: -1.50

## 2023-01-19 ASSESSMENT — CONFRONTATIONAL VISUAL FIELD TEST (CVF)
OD_FINDINGS: FULL
OS_FINDINGS: FULL

## 2023-01-19 ASSESSMENT — TONOMETRY
OD_IOP_MMHG: 13
OS_IOP_MMHG: 16

## 2023-01-20 ENCOUNTER — APPOINTMENT (OUTPATIENT)
Dept: ORTHOPEDIC SURGERY | Facility: CLINIC | Age: 78
End: 2023-01-20

## 2023-01-20 VITALS
SYSTOLIC BLOOD PRESSURE: 128 MMHG | HEIGHT: 68 IN | BODY MASS INDEX: 26.07 KG/M2 | DIASTOLIC BLOOD PRESSURE: 78 MMHG | WEIGHT: 172 LBS | HEART RATE: 72 BPM

## 2023-01-20 NOTE — PHYSICAL EXAM
[de-identified] : Surgical site: Left knee \par \par Incision sites: Well healed \par \par Range of motion: 0-145\par \par Motor Testin-/5 quad \par \par Stability Testing: Limited by pain \par \par Swelling/Effusion: None \par \par Tenderness to palpation: None \par \par Provocative testing: All negative \par \par Right Calf: soft and nontender \par Left Calf: soft and nontender \par  \par Neurovascular Examination: Grossly intact, 2+ distal pulses \par Contralateral Extremity: Examination grossly benign \par \par \par Assessment & Plan: The patient is approximately 2 weeks s/p left knee arthroscopic partial medial and lateral meniscectomy, chondroplasty medial femoral condyle (DOS: 2023). Sutures removed and Steri Strips applied today. The patient is instructed on wound management. The patient's post-op plan, protocol and activity modifications have been thoroughly discussed and the patient expressed understanding. Reviewed and discussed operative images in office today. Patient will continue use of brace as discussed. Continue physical therapy. The patient will control pain as discussed & continue ice and elevation as needed. The patient otherwise may advance activity as discussed. Follow up in May when returning from [place]. \par  \par The patient's current medication management of their orthopedic diagnosis was reviewed today:\par (1) We discussed a comprehensive treatment plan that included possible pharmaceutical management involving the use of prescription strength medications including but not limited to options such as oral Naprosyn 500mg BID, once daily Meloxicam 15 mg, or 500-650 mg Tylenol versus over the counter oral medications and topical prescription NSAID Pennsaid vs over the counter Voltaren gel.\par (2) There is a moderate risk of morbidity with further treatment, especially from use of prescription strength medications and possible side effects of these medications which include upset stomach with oral medications, skin reactions to topical medications and cardiac/renal issues with long term use.\par (3) I recommended that the patient follow-up with their medical physician to discuss any significant specific potential issues with long term medication use such as interactions with current medications or with exacerbation of underlying medical comorbidities.\par (4) The benefits and risks associated with use of injectable, oral or topical, prescription and over the counter anti-inflammatory medications were discussed with the patient. The patient voiced understanding of the risks including but not limited to bleeding, stroke, kidney dysfunction, heart disease, and were referred to the black box warning label for further information.\par  \par IYuridia attest that this documentation has been prepared under the direction and in the presence of Provider Dr. Jhon Lamb. \par \par The documentation recorded by the scribe accurately reflects the service I personally performed and the decisions made by me.\par The patient was seen by me under the direct supervision of Dr. Jhon Lamb.\par

## 2023-01-20 NOTE — HISTORY OF PRESENT ILLNESS
[de-identified] : The patient is a 77 year old s/p left knee PMM, PLM, chondroplasty MFC\par Date of Surgery: 1/6/2023\par Pain:  At Rest: 0/10 \par With Activity:  1/10 \par Mechanism of injury: No specific mechanism of injury \par This is not a Work Related Injury being treated under Worker's Compensation.\par This is not an athletic injury occurring associated with an interscholastic or organized sports team.\par Treatment/Imaging/Studies Since Last Visit: Surgrey, physical therapy\par 	Reports Available For Review Today: operative report \par Out of work/sport: No, since [not currently working]\par School/Sport/Position/Occupation: Retired; keeps pigeon coop, 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

## 2023-02-14 ENCOUNTER — APPOINTMENT (OUTPATIENT)
Dept: INTERNAL MEDICINE | Facility: CLINIC | Age: 78
End: 2023-02-14
Payer: MEDICARE

## 2023-02-14 DIAGNOSIS — J20.9 ACUTE BRONCHITIS, UNSPECIFIED: ICD-10-CM

## 2023-02-14 PROCEDURE — 99214 OFFICE O/P EST MOD 30 MIN: CPT

## 2023-02-14 NOTE — PHYSICAL EXAM
[No Acute Distress] : no acute distress [Well Nourished] : well nourished [Well Developed] : well developed [Well-Appearing] : well-appearing [Normal Sclera/Conjunctiva] : normal sclera/conjunctiva [PERRL] : pupils equal round and reactive to light [EOMI] : extraocular movements intact [Normal Outer Ear/Nose] : the outer ears and nose were normal in appearance [Normal Oropharynx] : the oropharynx was normal [No JVD] : no jugular venous distention [No Lymphadenopathy] : no lymphadenopathy [Supple] : supple [Thyroid Normal, No Nodules] : the thyroid was normal and there were no nodules present [No Respiratory Distress] : no respiratory distress  [No Accessory Muscle Use] : no accessory muscle use [Clear to Auscultation] : lungs were clear to auscultation bilaterally [Normal Rate] : normal rate  [Regular Rhythm] : with a regular rhythm [Normal S1, S2] : normal S1 and S2 [No Murmur] : no murmur heard [No Carotid Bruits] : no carotid bruits [No Abdominal Bruit] : a ~M bruit was not heard ~T in the abdomen [No Varicosities] : no varicosities [Pedal Pulses Present] : the pedal pulses are present [No Edema] : there was no peripheral edema [No Palpable Aorta] : no palpable aorta [No Extremity Clubbing/Cyanosis] : no extremity clubbing/cyanosis [Soft] : abdomen soft [Non Tender] : non-tender [Non-distended] : non-distended [No Masses] : no abdominal mass palpated [No HSM] : no HSM [Normal Bowel Sounds] : normal bowel sounds [Normal Posterior Cervical Nodes] : no posterior cervical lymphadenopathy [Normal Anterior Cervical Nodes] : no anterior cervical lymphadenopathy [No CVA Tenderness] : no CVA  tenderness [No Spinal Tenderness] : no spinal tenderness [No Joint Swelling] : no joint swelling [Grossly Normal Strength/Tone] : grossly normal strength/tone [No Rash] : no rash [Coordination Grossly Intact] : coordination grossly intact [No Focal Deficits] : no focal deficits [Normal Gait] : normal gait [Deep Tendon Reflexes (DTR)] : deep tendon reflexes were 2+ and symmetric [Normal Affect] : the affect was normal [Normal Insight/Judgement] : insight and judgment were intact [de-identified] : bilateral wheezing cough congestion

## 2023-02-14 NOTE — HEALTH RISK ASSESSMENT
[No] : No [No falls in past year] : Patient reported no falls in the past year [0] : 2) Feeling down, depressed, or hopeless: Not at all (0) [PHQ-2 Negative - No further assessment needed] : PHQ-2 Negative - No further assessment needed [[id number]] : 0 [Former] : Former

## 2023-02-14 NOTE — HISTORY OF PRESENT ILLNESS
[FreeTextEntry8] : acutely ill\par wheezing \par wife was sick\par he has cough congestion phlegm wheezing\par no sob no nvd or palpitatons\par does have nebulizer at home\par history of copd

## 2023-02-14 NOTE — ASSESSMENT
[FreeTextEntry1] : acute bronchitis, copd\par steroid, levaquin, use inhaler hycodan\par dm may increase on steroid temporarilty\par bp stable\par follow up if no improvement

## 2023-02-15 NOTE — ED ADULT TRIAGE NOTE - NS ED NURSE BANDS TYPE
Name band; Tissue Cultured Epidermal Autograft Text: The defect edges were debeveled with a #15 scalpel blade. Given the location of the defect, shape of the defect and the proximity to free margins a tissue cultured epidermal autograft was deemed most appropriate.  The graft was then trimmed to fit the size of the defect.  The graft was then placed in the primary defect and oriented appropriately.

## 2023-03-09 RX ORDER — GABAPENTIN 300 MG/1
300 CAPSULE ORAL 3 TIMES DAILY
Qty: 180 | Refills: 2 | Status: ACTIVE | OUTPATIENT
Start: 2022-12-22

## 2023-04-13 ENCOUNTER — APPOINTMENT (OUTPATIENT)
Dept: ORTHOPEDIC SURGERY | Facility: CLINIC | Age: 78
End: 2023-04-13

## 2023-04-24 ENCOUNTER — APPOINTMENT (OUTPATIENT)
Dept: CARDIOLOGY | Facility: CLINIC | Age: 78
End: 2023-04-24

## 2023-05-01 ENCOUNTER — APPOINTMENT (OUTPATIENT)
Dept: INTERNAL MEDICINE | Facility: CLINIC | Age: 78
End: 2023-05-01
Payer: MEDICARE

## 2023-05-01 VITALS — DIASTOLIC BLOOD PRESSURE: 72 MMHG | RESPIRATION RATE: 16 BRPM | SYSTOLIC BLOOD PRESSURE: 110 MMHG | HEART RATE: 67 BPM

## 2023-05-01 VITALS — HEIGHT: 68 IN | WEIGHT: 169 LBS | BODY MASS INDEX: 25.61 KG/M2

## 2023-05-01 DIAGNOSIS — E78.2 MIXED HYPERLIPIDEMIA: ICD-10-CM

## 2023-05-01 DIAGNOSIS — B02.29 OTHER POSTHERPETIC NERVOUS SYSTEM INVOLVEMENT: ICD-10-CM

## 2023-05-01 PROCEDURE — 99214 OFFICE O/P EST MOD 30 MIN: CPT | Mod: 25

## 2023-05-01 PROCEDURE — 36415 COLL VENOUS BLD VENIPUNCTURE: CPT

## 2023-05-01 RX ORDER — VALACYCLOVIR 1 G/1
1 TABLET, FILM COATED ORAL 3 TIMES DAILY
Qty: 21 | Refills: 0 | Status: DISCONTINUED | COMMUNITY
Start: 2022-12-22 | End: 2023-05-01

## 2023-05-01 NOTE — ASSESSMENT
[FreeTextEntry1] : post herpetic neuralgia get shingles shot\par dm stable\par htn stable\par cad stable\par needs activities in his life\par follow up cpe in the summer

## 2023-05-01 NOTE — HEALTH RISK ASSESSMENT
[Yes] : Yes [0] : 2) Feeling down, depressed, or hopeless: Not at all (0) [PHQ-2 Negative - No further assessment needed] : PHQ-2 Negative - No further assessment needed [PBC2Opoec] : 0 [> 15 Years] : > 15 Years

## 2023-05-01 NOTE — HISTORY OF PRESENT ILLNESS
[FreeTextEntry1] : follow shingles\par post herpetic neuralgia\par htn dm hld [de-identified] : follow up shingles\par ashd, dm htn\par has been having some fatigue of late\par stopped neuronitin and pain came back

## 2023-05-02 LAB
ALBUMIN SERPL ELPH-MCNC: 4 G/DL
ALP BLD-CCNC: 114 U/L
ALT SERPL-CCNC: 18 U/L
ANION GAP SERPL CALC-SCNC: 11 MMOL/L
AST SERPL-CCNC: 18 U/L
BASOPHILS # BLD AUTO: 0.05 K/UL
BASOPHILS NFR BLD AUTO: 0.8 %
BILIRUB SERPL-MCNC: 0.3 MG/DL
BUN SERPL-MCNC: 12 MG/DL
CALCIUM SERPL-MCNC: 9.4 MG/DL
CHLORIDE SERPL-SCNC: 107 MMOL/L
CHOLEST SERPL-MCNC: 119 MG/DL
CO2 SERPL-SCNC: 24 MMOL/L
CREAT SERPL-MCNC: 0.87 MG/DL
EGFR: 89 ML/MIN/1.73M2
EOSINOPHIL # BLD AUTO: 0.22 K/UL
EOSINOPHIL NFR BLD AUTO: 3.3 %
ESTIMATED AVERAGE GLUCOSE: 163 MG/DL
GLUCOSE SERPL-MCNC: 157 MG/DL
HBA1C MFR BLD HPLC: 7.3 %
HCT VFR BLD CALC: 40.7 %
HDLC SERPL-MCNC: 51 MG/DL
HGB BLD-MCNC: 13.4 G/DL
IMM GRANULOCYTES NFR BLD AUTO: 0.3 %
LDLC SERPL CALC-MCNC: 51 MG/DL
LYMPHOCYTES # BLD AUTO: 1.29 K/UL
LYMPHOCYTES NFR BLD AUTO: 19.5 %
MAN DIFF?: NORMAL
MCHC RBC-ENTMCNC: 30.7 PG
MCHC RBC-ENTMCNC: 32.9 GM/DL
MCV RBC AUTO: 93.3 FL
MONOCYTES # BLD AUTO: 0.57 K/UL
MONOCYTES NFR BLD AUTO: 8.6 %
NEUTROPHILS # BLD AUTO: 4.45 K/UL
NEUTROPHILS NFR BLD AUTO: 67.5 %
NONHDLC SERPL-MCNC: 68 MG/DL
PLATELET # BLD AUTO: 253 K/UL
POTASSIUM SERPL-SCNC: 4.4 MMOL/L
PROT SERPL-MCNC: 6.7 G/DL
RBC # BLD: 4.36 M/UL
RBC # FLD: 13.3 %
SODIUM SERPL-SCNC: 141 MMOL/L
TRIGL SERPL-MCNC: 88 MG/DL
WBC # FLD AUTO: 6.6 K/UL

## 2023-05-04 ENCOUNTER — APPOINTMENT (OUTPATIENT)
Dept: THORACIC SURGERY | Facility: CLINIC | Age: 78
End: 2023-05-04
Payer: MEDICARE

## 2023-05-04 VITALS
OXYGEN SATURATION: 94 % | RESPIRATION RATE: 15 BRPM | SYSTOLIC BLOOD PRESSURE: 144 MMHG | HEART RATE: 59 BPM | HEIGHT: 68 IN | DIASTOLIC BLOOD PRESSURE: 77 MMHG | BODY MASS INDEX: 25.46 KG/M2 | WEIGHT: 168 LBS

## 2023-05-04 DIAGNOSIS — C15.5 MALIGNANT NEOPLASM OF LOWER THIRD OF ESOPHAGUS: ICD-10-CM

## 2023-05-04 PROCEDURE — 99214 OFFICE O/P EST MOD 30 MIN: CPT

## 2023-05-05 RX ORDER — LEVOFLOXACIN 500 MG/1
500 TABLET, FILM COATED ORAL DAILY
Qty: 10 | Refills: 0 | Status: COMPLETED | COMMUNITY
Start: 2023-02-14 | End: 2023-05-05

## 2023-05-05 RX ORDER — ONDANSETRON 4 MG/1
4 TABLET ORAL
Qty: 15 | Refills: 0 | Status: COMPLETED | COMMUNITY
Start: 2023-01-02 | End: 2023-05-05

## 2023-05-05 RX ORDER — METHYLPREDNISOLONE 4 MG/1
4 TABLET ORAL
Qty: 1 | Refills: 1 | Status: COMPLETED | COMMUNITY
Start: 2022-12-01 | End: 2023-05-05

## 2023-05-05 RX ORDER — HYDROCODONE BITARTRATE AND ACETAMINOPHEN 5; 325 MG/1; MG/1
5-325 TABLET ORAL
Qty: 30 | Refills: 0 | Status: COMPLETED | COMMUNITY
Start: 2023-01-02 | End: 2023-05-05

## 2023-05-05 RX ORDER — METHYLPREDNISOLONE 4 MG/1
4 TABLET ORAL
Qty: 1 | Refills: 1 | Status: COMPLETED | COMMUNITY
Start: 2023-02-14 | End: 2023-05-05

## 2023-05-05 RX ORDER — DOCUSATE SODIUM 100 MG/1
100 CAPSULE ORAL 3 TIMES DAILY
Qty: 21 | Refills: 0 | Status: COMPLETED | COMMUNITY
Start: 2023-01-02 | End: 2023-05-05

## 2023-05-05 RX ORDER — HYDROCODONE BITARTRATE AND HOMATROPINE METHYLBROMIDE 1.5; 5 MG/5ML; MG/5ML
5-1.5 SOLUTION ORAL EVERY 8 HOURS
Qty: 105 | Refills: 0 | Status: COMPLETED | COMMUNITY
Start: 2023-02-14 | End: 2023-05-05

## 2023-05-05 NOTE — ASSESSMENT
[FreeTextEntry1] : Mr. ALEJA JUÁREZ, 77 year old male, former smoker, w/ hx of HTN, DM2, LEILA on CPAP, COPD, skin CA, Esophageal CA s/p induction chemo then surgery in 2008, no chemo after surgery, who presented today to re-establish care. \par \par Now 14 yrs 5 mo s/p EGD, Laparotomy, ALND, feeding jejunostomy tube placement, Rt Thoracotomy en bloc esophagectomy, MLND on 12/2/2008. 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 CT reviewed, stable scan. RTC in 1 yr with CT Chest w/o contrast.\par \par \par I, FLORES Neil, personally performed the evaluation and management (E/M) services for this established patient who presents today with (a) new problem(s)/exacerbation of (an) existing condition(s).  That E/M includes conducting the examination, assessing all new/exacerbated conditions, and establishing a new plan of care.  Today, my ACP, Flavia Adamson NP was here to observe my evaluation and management services for this new problem/exacerbated condition to be followed going forward.\par \par \par \par \par \par \par

## 2023-05-05 NOTE — CONSULT LETTER
[Dear  ___] : Dear  [unfilled], [Consult Letter:] : I had the pleasure of evaluating your patient, [unfilled]. [( Thank you for referring [unfilled] for consultation for _____ )] : Thank you for referring [unfilled] for consultation for [unfilled] [Please see my note below.] : Please see my note below. [Consult Closing:] : Thank you very much for allowing me to participate in the care of this patient.  If you have any questions, please do not hesitate to contact me. [Sincerely,] : Sincerely, [DrFranklyn  ___] : Dr. CASTRO [FreeTextEntry2] : Dr. Jazmin Garrett MD (PCP)\par Dr. Raya (GI)\par Dr. Mason (Pulm) \par Dr. Baker (Cardiologist) [FreeTextEntry3] : Juventino Gillis MD, MPH \par System Director of Thoracic Surgery \par Director of Comprehensive Lung and [facility] [place] \par Professor Cardiovascular & Thoracic Surgery  \par [facility] [employer] [facility]\par \par [facility]\par [address]\par Oncology [address]\[place] [address]\par Tel: [phone]\par Fax: [phone]\par

## 2023-05-05 NOTE — HISTORY OF PRESENT ILLNESS
[FreeTextEntry1] : Mr. ALEJA JUÁREZ, 77 year old male, former smoker, w/ hx of HTN, DM2, LEILA on CPAP, COPD, skin CA, Esophageal CA s/p induction chemo then surgery in 2008, no chemo after surgery, who presented today to re-establish care. \par \par Patient was last seen on 11/14/2017 and I recommended patient to RTC in 1 year, no f/u since then. \par \par Now 14 yrs 5 mo s/p EGD, Laparotomy, ALND, feeding jejunostomy tube placement, Rt Thoracotomy en bloc esophagectomy, MLND on 12/2/2008. Path revealed invasive AdenoCA of esophagus, margins and LNs negative, [id number] Stg I.\par \par CT chest on 12/11/2020:\par - upper abdominal and esophageal post-op changes\par - two 3 mm RLL nodule, not definitely visualized on the prior study\par - 2 cm Right upper pole renal cyst, previously measured 1.3 cm. \par \par Of note, EGD Feb 2020 by Dr. Raya was "normal" per patient.\par \par CT Chest on 1/27/22:\par - Postsurgical changes with chronic emphysematous and interstitial changes of the lungs\par - 1.7 cm renal mass; Stones in Gallbladder\par \par CT Chest on 4/25/23 at [facility]:\par - s/p gastric pull-through\par - emphysema; chronic bronchial inflammatory disease and bronchiolitis pattern\par - more accentuated ggo in bilateral lower lobes and surrounding interstitial fibrotic densities\par - new clusters of tiny tree-in-bud nodules: in the LLL (10:66), likely acute exacerbation of bronchiolitis\par \par Pt presents today for follow up. Pt is doing well, has mild acid reflux but controlled with medications. \par

## 2023-05-09 ENCOUNTER — NON-APPOINTMENT (OUTPATIENT)
Age: 78
End: 2023-05-09

## 2023-05-20 ENCOUNTER — OFFICE (OUTPATIENT)
Dept: URBAN - METROPOLITAN AREA CLINIC 100 | Facility: CLINIC | Age: 78
Setting detail: OPHTHALMOLOGY
End: 2023-05-20
Payer: MEDICARE

## 2023-05-20 DIAGNOSIS — D49.2: ICD-10-CM

## 2023-05-20 PROCEDURE — 99214 OFFICE O/P EST MOD 30 MIN: CPT | Performed by: OPHTHALMOLOGY

## 2023-05-20 PROCEDURE — 92285 EXTERNAL OCULAR PHOTOGRAPHY: CPT | Performed by: OPHTHALMOLOGY

## 2023-05-20 ASSESSMENT — CONFRONTATIONAL VISUAL FIELD TEST (CVF)
OD_FINDINGS: FULL
OS_FINDINGS: FULL

## 2023-05-20 ASSESSMENT — KERATOMETRY
OS_K2POWER_DIOPTERS: 44.75
OD_AXISANGLE_DEGREES: 160
OD_K1POWER_DIOPTERS: 44.50
OS_AXISANGLE_DEGREES: 054
OS_K1POWER_DIOPTERS: 44.50
OD_K2POWER_DIOPTERS: 45.00

## 2023-05-20 ASSESSMENT — REFRACTION_AUTOREFRACTION
OS_CYLINDER: -1.50
OD_SPHERE: +3.00
OS_SPHERE: +2.00
OD_CYLINDER: -1.50
OD_AXIS: 077
OS_AXIS: 098

## 2023-05-20 ASSESSMENT — SPHEQUIV_DERIVED
OD_SPHEQUIV: 2.25
OS_SPHEQUIV: 1.25

## 2023-05-20 ASSESSMENT — AXIALLENGTH_DERIVED
OS_AL: 22.7248
OD_AL: 22.3277

## 2023-05-20 ASSESSMENT — VISUAL ACUITY
OD_BCVA: 20/25+1
OS_BCVA: 20/20-1

## 2023-06-01 ENCOUNTER — APPOINTMENT (OUTPATIENT)
Dept: INTERNAL MEDICINE | Facility: CLINIC | Age: 78
End: 2023-06-01

## 2023-08-15 ENCOUNTER — APPOINTMENT (OUTPATIENT)
Dept: PULMONOLOGY | Facility: CLINIC | Age: 78
End: 2023-08-15
Payer: MEDICARE

## 2023-08-15 VITALS
DIASTOLIC BLOOD PRESSURE: 70 MMHG | RESPIRATION RATE: 16 BRPM | HEART RATE: 56 BPM | OXYGEN SATURATION: 98 % | SYSTOLIC BLOOD PRESSURE: 120 MMHG

## 2023-08-15 PROCEDURE — 99214 OFFICE O/P EST MOD 30 MIN: CPT

## 2023-08-15 NOTE — ASSESSMENT
[FreeTextEntry1] : Severe sleep apnea on CPAP 11.  Current machine at the end of its useful life.  New AutoPap at 6-14 cmH2O has been ordered and I will see him back after he has been on the new machine for 2 months to review compliance and efficacy.

## 2023-08-15 NOTE — HISTORY OF PRESENT ILLNESS
[TextBox_4] : Patient with severe sleep apnea on CPAP 12.  His current machine is at the end of its useful life and needs to be replaced.  He has been reasonably compliant.  He had some confusion about whether his machine was on recall but it is a ResMed machine and I told him it was safe to use.

## 2023-08-17 ENCOUNTER — APPOINTMENT (OUTPATIENT)
Dept: ORTHOPEDIC SURGERY | Facility: CLINIC | Age: 78
End: 2023-08-17
Payer: MEDICARE

## 2023-08-17 VITALS
SYSTOLIC BLOOD PRESSURE: 154 MMHG | BODY MASS INDEX: 25.46 KG/M2 | HEIGHT: 68 IN | WEIGHT: 168 LBS | DIASTOLIC BLOOD PRESSURE: 77 MMHG

## 2023-08-17 PROCEDURE — 99214 OFFICE O/P EST MOD 30 MIN: CPT | Mod: 25

## 2023-08-17 PROCEDURE — 20550 NJX 1 TENDON SHEATH/LIGAMENT: CPT | Mod: 50,59

## 2023-08-17 PROCEDURE — 73130 X-RAY EXAM OF HAND: CPT | Mod: 50

## 2023-08-17 PROCEDURE — 73110 X-RAY EXAM OF WRIST: CPT | Mod: 50

## 2023-08-17 NOTE — HISTORY OF PRESENT ILLNESS
[FreeTextEntry1] : Jhonny is a pleasant 77-year-old male who presents today with a greater than 1 year history of worsening pain and stiffness in multiple fingers including the left index and middle as well as the right middle fingers.  He complains of triggering where he has to passively correct

## 2023-08-17 NOTE — PHYSICAL EXAM
[de-identified] : Examination of the hands particularly at the A1 of the left thumb, left index, left middle, right middle reveals tenderness with a palpable click.  [de-identified] : [4] views of [bilateral hands and wrists] were obtained today in my office and were seen by me and discussed with the patient.  These [show findings consistent with bilateral basal joint OA and findings of IP joint OA]

## 2023-08-17 NOTE — ASSESSMENT
[FreeTextEntry1] : ASSESSMENT: [The patient was accompanied today and was assisted with explaining their complaints today.] The patient comes in today with chronic exacerbated symptoms of tendinopathy in bilateral hands.  At this point he we have discussed trigger injections in hopes of nonoperative improvement   The patient was adequately and thoroughly informed of my assessment of their current condition(s).  - This may diminish bodily function for the extremity. We discussed prognosis, treatment modalities including operative and nonoperative options for the above diagnostic assessment. For this, when accessible, I was able to review other physicians note(s) including reviewing other tests, imaging results as well as personally view these results for my own interpretation.   Injection:  The risks and benefits of a steroid injection were discussed in detail. The risks include but are not limited to: pain, infection, swelling, flare response, bleeding, subcutaneous fat atrophy, skin depigmentation and/or elevation of blood sugar. The risk of incomplete resolution of symptoms, recurrence and additional intervention was reviewed and considered by the patient.  The patient agreed to proceed and under a sterile prep, I injected 1 unit into 1 cc of a combination of Celestone and Lidocaine into the left thumb A1, left index A1, left middle A1, right middle A1. The patient tolerated the injection well. The patient was adequately and thoroughly informed of my assessment of their current condition(s).  DISCUSSION: 1.  I am hopeful that above trigger finger injections lead to improvement of symptoms 2.  Follow-up 10 weeks

## 2023-11-02 ENCOUNTER — APPOINTMENT (OUTPATIENT)
Dept: ORTHOPEDIC SURGERY | Facility: CLINIC | Age: 78
End: 2023-11-02
Payer: MEDICARE

## 2023-11-02 VITALS
SYSTOLIC BLOOD PRESSURE: 117 MMHG | HEART RATE: 64 BPM | BODY MASS INDEX: 25.46 KG/M2 | WEIGHT: 168 LBS | DIASTOLIC BLOOD PRESSURE: 70 MMHG | HEIGHT: 68 IN

## 2023-11-02 PROCEDURE — 20550 NJX 1 TENDON SHEATH/LIGAMENT: CPT | Mod: LT

## 2023-11-02 PROCEDURE — 99214 OFFICE O/P EST MOD 30 MIN: CPT | Mod: 25

## 2023-11-06 ENCOUNTER — APPOINTMENT (OUTPATIENT)
Dept: PULMONOLOGY | Facility: CLINIC | Age: 78
End: 2023-11-06
Payer: MEDICARE

## 2023-11-06 VITALS
SYSTOLIC BLOOD PRESSURE: 126 MMHG | HEART RATE: 59 BPM | OXYGEN SATURATION: 97 % | RESPIRATION RATE: 16 BRPM | BODY MASS INDEX: 25.46 KG/M2 | WEIGHT: 168 LBS | DIASTOLIC BLOOD PRESSURE: 68 MMHG | HEIGHT: 68 IN

## 2023-11-06 PROCEDURE — 99214 OFFICE O/P EST MOD 30 MIN: CPT

## 2023-12-19 ENCOUNTER — APPOINTMENT (OUTPATIENT)
Dept: INTERNAL MEDICINE | Facility: CLINIC | Age: 78
End: 2023-12-19
Payer: MEDICARE

## 2023-12-19 VITALS
SYSTOLIC BLOOD PRESSURE: 120 MMHG | WEIGHT: 163.25 LBS | TEMPERATURE: 98.7 F | OXYGEN SATURATION: 97 % | HEART RATE: 61 BPM | DIASTOLIC BLOOD PRESSURE: 70 MMHG | HEIGHT: 68 IN | BODY MASS INDEX: 24.74 KG/M2 | RESPIRATION RATE: 16 BRPM

## 2023-12-19 DIAGNOSIS — E73.9 LACTOSE INTOLERANCE, UNSPECIFIED: ICD-10-CM

## 2023-12-19 DIAGNOSIS — M19.90 UNSPECIFIED OSTEOARTHRITIS, UNSPECIFIED SITE: ICD-10-CM

## 2023-12-19 DIAGNOSIS — M25.562 PAIN IN LEFT KNEE: ICD-10-CM

## 2023-12-19 PROCEDURE — 99214 OFFICE O/P EST MOD 30 MIN: CPT | Mod: GC

## 2023-12-19 RX ORDER — METHYLPREDNISOLONE 4 MG/1
4 TABLET ORAL DAILY
Qty: 1 | Refills: 0 | Status: DISCONTINUED | COMMUNITY
Start: 2023-12-19 | End: 2023-12-19

## 2023-12-19 NOTE — ASSESSMENT
[FreeTextEntry1] : Left leg osteoarthritis - c/w OTC acetaminophen - ordered Xray of back, hip and knee  - given Methylprednisolone taper   Lactulose intolerance - watery diarrhea foul smell - On PPI  - avoid dairy product

## 2023-12-19 NOTE — HISTORY OF PRESENT ILLNESS
[Musculoskeletal Symptoms Legs] : leg [FreeTextEntry8] : 78M presenting with left leg pain and morning stiffness and improves with movement.  Pt c/o loose stool right after milk product, pt describes the diarrhea as brown watery and foul odor and would like to try probiotics  Pt c/o loss about 12lb over a year. Pt received the RSV vaccine in 11/2023

## 2023-12-20 ENCOUNTER — NON-APPOINTMENT (OUTPATIENT)
Age: 78
End: 2023-12-20

## 2023-12-21 ENCOUNTER — NON-APPOINTMENT (OUTPATIENT)
Age: 78
End: 2023-12-21

## 2024-05-02 ENCOUNTER — APPOINTMENT (OUTPATIENT)
Dept: ORTHOPEDIC SURGERY | Facility: CLINIC | Age: 79
End: 2024-05-02
Payer: MEDICARE

## 2024-05-02 VITALS
HEART RATE: 55 BPM | WEIGHT: 163 LBS | HEIGHT: 68 IN | BODY MASS INDEX: 24.71 KG/M2 | SYSTOLIC BLOOD PRESSURE: 139 MMHG | DIASTOLIC BLOOD PRESSURE: 77 MMHG

## 2024-05-02 DIAGNOSIS — M79.646 PAIN IN UNSPECIFIED HAND: ICD-10-CM

## 2024-05-02 DIAGNOSIS — M79.643 PAIN IN UNSPECIFIED HAND: ICD-10-CM

## 2024-05-02 DIAGNOSIS — M65.30 TRIGGER FINGER, UNSPECIFIED FINGER: ICD-10-CM

## 2024-05-02 PROCEDURE — 99213 OFFICE O/P EST LOW 20 MIN: CPT

## 2024-05-02 NOTE — PHYSICAL EXAM
[de-identified] : Prior examination: Examination of the hands particularly at the A1 of the left thumb, left index, left middle reveals tenderness with a palpable click.  [de-identified] : [4] views of [bilateral hands and wrists] were reviewed today in my office and were seen by me and discussed with the patient.  These [show findings consistent with bilateral basal joint OA and findings of IP joint OA]

## 2024-05-02 NOTE — ASSESSMENT
[FreeTextEntry1] : ASSESSMENT: [The patient was accompanied today and was assisted with explaining their complaints today.] The patient comes in today with chronic exacerbated recurring tendinopathy i.e. trigger fingers.  Injections and activity modification has been beneficial.   The patient was adequately and thoroughly informed of my assessment of their current condition(s).  - This may diminish bodily function for the extremity. We discussed prognosis, treatment modalities including operative and nonoperative options for the above diagnostic assessment. For this, when accessible, I was able to review other physicians note(s) including reviewing other tests, imaging results as well as personally view these results for my own interpretation.   The patient was adequately and thoroughly informed of my assessment of their current condition(s).  DISCUSSION: 1.  We have discussed activity modification as needed for tendinopathy in the hands 2. [x] 3. [x]

## 2024-05-02 NOTE — HISTORY OF PRESENT ILLNESS
[FreeTextEntry1] : Jhonny is a pleasant 77-year-old male who presents today with a greater than 1 year history of worsening pain and stiffness in multiple fingers.  Injections have been beneficial

## 2024-05-06 ENCOUNTER — EMERGENCY (EMERGENCY)
Facility: HOSPITAL | Age: 79
LOS: 1 days | Discharge: HOME | End: 2024-05-06
Attending: EMERGENCY MEDICINE
Payer: MEDICARE

## 2024-05-06 VITALS
HEIGHT: 66 IN | OXYGEN SATURATION: 98 % | TEMPERATURE: 98 F | WEIGHT: 169.76 LBS | HEART RATE: 77 BPM | SYSTOLIC BLOOD PRESSURE: 146 MMHG | DIASTOLIC BLOOD PRESSURE: 88 MMHG | RESPIRATION RATE: 20 BRPM

## 2024-05-06 DIAGNOSIS — Z98.89 OTHER SPECIFIED POSTPROCEDURAL STATES: Chronic | ICD-10-CM

## 2024-05-06 DIAGNOSIS — Z98.890 OTHER SPECIFIED POSTPROCEDURAL STATES: Chronic | ICD-10-CM

## 2024-05-06 LAB
ANION GAP SERPL CALC-SCNC: 12 MMOL/L — SIGNIFICANT CHANGE UP (ref 5–17)
BASOPHILS # BLD AUTO: 0.02 K/UL — SIGNIFICANT CHANGE UP (ref 0–0.2)
BASOPHILS NFR BLD AUTO: 0.2 % — SIGNIFICANT CHANGE UP (ref 0–2)
BUN SERPL-MCNC: 17.4 MG/DL — SIGNIFICANT CHANGE UP (ref 8–20)
CALCIUM SERPL-MCNC: 8.7 MG/DL — SIGNIFICANT CHANGE UP (ref 8.4–10.5)
CHLORIDE SERPL-SCNC: 104 MMOL/L — SIGNIFICANT CHANGE UP (ref 96–108)
CO2 SERPL-SCNC: 24 MMOL/L — SIGNIFICANT CHANGE UP (ref 22–29)
CREAT SERPL-MCNC: 0.82 MG/DL — SIGNIFICANT CHANGE UP (ref 0.5–1.3)
EGFR: 90 ML/MIN/1.73M2 — SIGNIFICANT CHANGE UP
EOSINOPHIL # BLD AUTO: 0.01 K/UL — SIGNIFICANT CHANGE UP (ref 0–0.5)
EOSINOPHIL NFR BLD AUTO: 0.1 % — SIGNIFICANT CHANGE UP (ref 0–6)
GLUCOSE SERPL-MCNC: 162 MG/DL — HIGH (ref 70–99)
HCT VFR BLD CALC: 38.3 % — LOW (ref 39–50)
HGB BLD-MCNC: 13.4 G/DL — SIGNIFICANT CHANGE UP (ref 13–17)
IMM GRANULOCYTES NFR BLD AUTO: 0.4 % — SIGNIFICANT CHANGE UP (ref 0–0.9)
LYMPHOCYTES # BLD AUTO: 1.36 K/UL — SIGNIFICANT CHANGE UP (ref 1–3.3)
LYMPHOCYTES # BLD AUTO: 13.6 % — SIGNIFICANT CHANGE UP (ref 13–44)
MCHC RBC-ENTMCNC: 32.3 PG — SIGNIFICANT CHANGE UP (ref 27–34)
MCHC RBC-ENTMCNC: 35 GM/DL — SIGNIFICANT CHANGE UP (ref 32–36)
MCV RBC AUTO: 92.3 FL — SIGNIFICANT CHANGE UP (ref 80–100)
MONOCYTES # BLD AUTO: 0.97 K/UL — HIGH (ref 0–0.9)
MONOCYTES NFR BLD AUTO: 9.7 % — SIGNIFICANT CHANGE UP (ref 2–14)
NEUTROPHILS # BLD AUTO: 7.58 K/UL — HIGH (ref 1.8–7.4)
NEUTROPHILS NFR BLD AUTO: 76 % — SIGNIFICANT CHANGE UP (ref 43–77)
PLATELET # BLD AUTO: 222 K/UL — SIGNIFICANT CHANGE UP (ref 150–400)
POTASSIUM SERPL-MCNC: 4.1 MMOL/L — SIGNIFICANT CHANGE UP (ref 3.5–5.3)
POTASSIUM SERPL-SCNC: 4.1 MMOL/L — SIGNIFICANT CHANGE UP (ref 3.5–5.3)
RBC # BLD: 4.15 M/UL — LOW (ref 4.2–5.8)
RBC # FLD: 13.9 % — SIGNIFICANT CHANGE UP (ref 10.3–14.5)
SODIUM SERPL-SCNC: 140 MMOL/L — SIGNIFICANT CHANGE UP (ref 135–145)
WBC # BLD: 9.98 K/UL — SIGNIFICANT CHANGE UP (ref 3.8–10.5)
WBC # FLD AUTO: 9.98 K/UL — SIGNIFICANT CHANGE UP (ref 3.8–10.5)

## 2024-05-06 PROCEDURE — 94640 AIRWAY INHALATION TREATMENT: CPT

## 2024-05-06 PROCEDURE — 71045 X-RAY EXAM CHEST 1 VIEW: CPT

## 2024-05-06 PROCEDURE — 80048 BASIC METABOLIC PNL TOTAL CA: CPT

## 2024-05-06 PROCEDURE — 99283 EMERGENCY DEPT VISIT LOW MDM: CPT | Mod: 25

## 2024-05-06 PROCEDURE — 85025 COMPLETE CBC W/AUTO DIFF WBC: CPT

## 2024-05-06 PROCEDURE — 36415 COLL VENOUS BLD VENIPUNCTURE: CPT

## 2024-05-06 PROCEDURE — 99284 EMERGENCY DEPT VISIT MOD MDM: CPT | Mod: GC

## 2024-05-06 PROCEDURE — 71045 X-RAY EXAM CHEST 1 VIEW: CPT | Mod: 26

## 2024-05-06 RX ORDER — IPRATROPIUM/ALBUTEROL SULFATE 18-103MCG
3 AEROSOL WITH ADAPTER (GRAM) INHALATION EVERY 6 HOURS
Refills: 0 | Status: DISCONTINUED | OUTPATIENT
Start: 2024-05-06 | End: 2024-05-14

## 2024-05-06 RX ADMIN — Medication 3 MILLILITER(S): at 14:34

## 2024-05-06 NOTE — ED ADULT NURSE NOTE - NSFALLRISKINTERV_ED_ALL_ED
Assistance OOB with selected safe patient handling equipment if applicable/Assistance with ambulation/Communicate fall risk and risk factors to all staff, patient, and family/Monitor gait and stability/Provide visual cue: yellow wristband, yellow gown, etc/Reinforce activity limits and safety measures with patient and family/Call bell, personal items and telephone in reach/Instruct patient to call for assistance before getting out of bed/chair/stretcher/Non-slip footwear applied when patient is off stretcher/[place] to call system/Physically safe environment - no spills, clutter or unnecessary equipment/Purposeful Proactive Rounding/Room/bathroom lighting operational, light cord in reach

## 2024-05-06 NOTE — ED PROVIDER NOTE - OBJECTIVE STATEMENT
78M with PMHx of COPD, LEILA (nightly CPAP0, T2DM, CAD s/ PCI x3, HTN, HLD, esophageal CA s/p resection (2009), cerebral aneurysm s/p coiling presenting due to chills, max temperature of 101F since 3 AM on 5/6/2024, and worsening productive cough for the past week. Patient contacted PCP Dr. Reardon who recommended patient visit ED. Denies choking, sick contacts, or travel outside of country.

## 2024-05-06 NOTE — ED PROVIDER NOTE - PATIENT PORTAL LINK FT
You can access the FollowMyHealth Patient Portal offered by [facility] by registering at the following website: [email or web address]. By joining [facility]’s FollowMyHealth portal, you will also be able to view your health information using other applications (apps) compatible with our system.

## 2024-05-06 NOTE — ED PROVIDER NOTE - NSFOLLOWUPINSTRUCTIONS_ED_ALL_ED_FT
Coughing is a reflex that clears your throat and airways (respiratory system). It helps heal and protect your lungs. It is normal to cough from time to time. A cough that happens with other symptoms or that lasts a long time may be a sign of a condition that needs treatment. A short-term (acute) cough may only last 2–3 weeks. A long-term (chronic) cough may last 8 or more weeks.    Coughing is often caused by:  Diseases, such as:  An infection of the respiratory system.  Asthma or other heart or lung diseases.  Gastroesophageal reflux. This is when acid comes back up from the stomach.  Breathing in things that irritate your lungs.  Allergies.  Postnasal drip. This is when mucus runs down the back of your throat.  Smoking.  Some medicines.  Follow these instructions at home:  Medicines    Take over-the-counter and prescription medicines only as told by your health care provider.  Talk with your provider before you take cough medicine (cough suppressants).  Eating and drinking    Do not drink alcohol.  Avoid caffeine.  Drink enough fluid to keep your pee (urine) pale yellow.  Lifestyle    Avoid cigarette smoke.  Do not use any products that contain nicotine or tobacco. These products include cigarettes, chewing tobacco, and vaping devices, such as e-cigarettes. If you need help quitting, ask your provider.  Avoid things that make you cough. These may include perfumes, candles, cleaning products, or campfire smoke.  General instructions    A person holding a cloth over the mouth and nose while coughing.  Watch for any changes to your cough. Tell your provider about them.  Always cover your mouth when you cough.  If the air is dry in your bedroom or home, use a cool mist vaporizer or humidifier.  If your cough is worse at night, try to sleep in a semi-upright position.  Rest as needed.  Contact a health care provider if:  You have new symptoms, or your symptoms get worse.  You cough up pus.  You have a fever that does not go away or a cough that does not get better after 2–3 weeks.  You cannot control your cough with medicine, and you are losing sleep.  You have pain that gets worse or is not helped with medicine.  You lose weight for no clear reason.  You have night sweats.  Get help right away if:  You cough up blood.  You have trouble breathing.  Your heart is beating very fast.  These symptoms may be an emergency. Get help right away. Call 911.  Do not wait to see if the symptoms will go away.  Do not drive yourself to the hospital.

## 2024-05-06 NOTE — ED PROVIDER NOTE - NSDCPRINTRESULTS_ED_ALL_ED
Patient requests all Lab, Cardiology, and Radiology Results on their Discharge Instructions Out of season

## 2024-05-06 NOTE — ED PROVIDER NOTE - ATTENDING CONTRIBUTION TO CARE
I, Steve Riggins MD, performed the initial face to face bedside interview with this patient regarding history of present illness, review of symptoms and relevant past medical, social and family history.  I completed an independent physical examination.  I was the initial provider who evaluated this patient. I have signed out the follow up of any pending tests (i.e. labs, radiological studies) to the resident.  I have communicated the patient’s plan of care and disposition with the resident.    fever and congestion for few days; labs, CXR

## 2024-05-06 NOTE — ED ADULT NURSE NOTE - OBJECTIVE STATEMENT
Pt is AxOx3, c/o fever and increasing SOB. Pt had a low grade fever last bight and received tylenol. Pt has increasing SOB when ambulating. Pt is aware of plan of care.

## 2024-05-06 NOTE — ED ADULT TRIAGE NOTE - CHIEF COMPLAINT QUOTE
Pt arrives to Ed c/o fever started last night + productive cough- Hx of COPD . Breathing easy and unlabored

## 2024-05-06 NOTE — ED PROVIDER NOTE - CLINICAL SUMMARY MEDICAL DECISION MAKING FREE TEXT BOX
78M with PMHx of COPD, LEILA (on CPAP), T2DM, CAD w/ PCI x3, HTN, HLD, esophageal cancer s/p resection (2009), cerebral aneurysm s/p coiling presenting due to worsening productive cough x1 week, chills and Tmax 101F starting this AM. Etiology likely COPD exacerbation vs PNA.    PLAN:  - Duoneb q6h  - CBC  - BMP  - CXR 78M with PMHx of COPD, LEILA (on CPAP), T2DM, CAD w/ PCI x3, HTN, HLD, esophageal cancer s/p resection (2009), cerebral aneurysm s/p coiling presenting due to worsening productive cough x1 week, chills and Tmax 101F starting this AM. Etiology likely COPD exacerbation vs PNA.    PLAN:  - Duoneb x1 dose  - CBC unremarkable  - BMP unremarkable  - CXR: nonacute  - Follow up with PCP outpatient

## 2024-05-07 ENCOUNTER — APPOINTMENT (OUTPATIENT)
Dept: INTERNAL MEDICINE | Facility: CLINIC | Age: 79
End: 2024-05-07
Payer: MEDICARE

## 2024-05-07 VITALS
BODY MASS INDEX: 24.71 KG/M2 | RESPIRATION RATE: 12 BRPM | DIASTOLIC BLOOD PRESSURE: 78 MMHG | HEIGHT: 68 IN | SYSTOLIC BLOOD PRESSURE: 132 MMHG | WEIGHT: 163 LBS | HEART RATE: 72 BPM

## 2024-05-07 VITALS
RESPIRATION RATE: 12 BRPM | SYSTOLIC BLOOD PRESSURE: 138 MMHG | DIASTOLIC BLOOD PRESSURE: 78 MMHG | WEIGHT: 163 LBS | OXYGEN SATURATION: 96 % | BODY MASS INDEX: 24.71 KG/M2 | HEIGHT: 68 IN | HEART RATE: 61 BPM

## 2024-05-07 VITALS — BODY MASS INDEX: 24.71 KG/M2 | WEIGHT: 163 LBS | HEIGHT: 68 IN

## 2024-05-07 DIAGNOSIS — I25.5 ISCHEMIC CARDIOMYOPATHY: ICD-10-CM

## 2024-05-07 DIAGNOSIS — J44.1 CHRONIC OBSTRUCTIVE PULMONARY DISEASE WITH (ACUTE) EXACERBATION: ICD-10-CM

## 2024-05-07 DIAGNOSIS — E11.9 TYPE 2 DIABETES MELLITUS W/OUT COMPLICATIONS: ICD-10-CM

## 2024-05-07 DIAGNOSIS — I10 ESSENTIAL (PRIMARY) HYPERTENSION: ICD-10-CM

## 2024-05-07 DIAGNOSIS — I25.10 ATHEROSCLEROTIC HEART DISEASE OF NATIVE CORONARY ARTERY W/OUT ANGINA PECTORIS: ICD-10-CM

## 2024-05-07 PROCEDURE — 99214 OFFICE O/P EST MOD 30 MIN: CPT

## 2024-05-07 PROCEDURE — G0444 DEPRESSION SCREEN ANNUAL: CPT | Mod: 59

## 2024-05-07 PROCEDURE — G2211 COMPLEX E/M VISIT ADD ON: CPT

## 2024-05-07 PROCEDURE — 36415 COLL VENOUS BLD VENIPUNCTURE: CPT

## 2024-05-07 NOTE — HEALTH RISK ASSESSMENT
[No] : No [No falls in past year] : Patient reported no falls in the past year [Little interest or pleasure doing things] : 1) Little interest or pleasure doing things [Feeling down, depressed, or hopeless] : 2) Feeling down, depressed, or hopeless [0] : 2) Feeling down, depressed, or hopeless: Not at all (0) [PHQ-2 Negative - No further assessment needed] : PHQ-2 Negative - No further assessment needed [[id number]] : 0 [Former] : Former [> 15 Years] : > 15 Years

## 2024-05-07 NOTE — HISTORY OF PRESENT ILLNESS
[FreeTextEntry1] : ER follow up  [de-identified] : Er follow up  had fever cough over weekend now whitish phlegm though a bit better today cxr and labs from yesterday

## 2024-05-07 NOTE — ASSESSMENT
[FreeTextEntry1] : copd exacerbation steroids doxy cad stable dm table htn stable check viral panel cxr no PNA

## 2024-05-08 LAB
INFLUENZA A RESULT: NOT DETECTED
INFLUENZA B RESULT: NOT DETECTED
RESP SYN VIRUS RESULT: NOT DETECTED
SARS-COV-2 RESULT: NOT DETECTED

## 2024-05-09 ENCOUNTER — OFFICE (OUTPATIENT)
Dept: URBAN - METROPOLITAN AREA CLINIC 100 | Facility: CLINIC | Age: 79
Setting detail: OPHTHALMOLOGY
End: 2024-05-09
Payer: MEDICARE

## 2024-05-09 ENCOUNTER — APPOINTMENT (OUTPATIENT)
Dept: ORTHOPEDIC SURGERY | Facility: CLINIC | Age: 79
End: 2024-05-09
Payer: MEDICARE

## 2024-05-09 VITALS — WEIGHT: 163 LBS | HEIGHT: 68 IN | BODY MASS INDEX: 24.71 KG/M2

## 2024-05-09 DIAGNOSIS — B02.1: ICD-10-CM

## 2024-05-09 DIAGNOSIS — M17.12 UNILATERAL PRIMARY OSTEOARTHRITIS, LEFT KNEE: ICD-10-CM

## 2024-05-09 DIAGNOSIS — M47.816 SPONDYLOSIS W/OUT MYELOPATHY OR RADICULOPATHY, LUMBAR REGION: ICD-10-CM

## 2024-05-09 DIAGNOSIS — H25.13: ICD-10-CM

## 2024-05-09 DIAGNOSIS — M16.12 UNILATERAL PRIMARY OSTEOARTHRITIS, LEFT HIP: ICD-10-CM

## 2024-05-09 DIAGNOSIS — H59.811: ICD-10-CM

## 2024-05-09 DIAGNOSIS — S83.8X2A SPRAIN OF OTHER SPECIFIED PARTS OF LEFT KNEE, INITIAL ENCOUNTER: ICD-10-CM

## 2024-05-09 PROCEDURE — 92014 COMPRE OPH EXAM EST PT 1/>: CPT | Performed by: OPHTHALMOLOGY

## 2024-05-09 PROCEDURE — 99214 OFFICE O/P EST MOD 30 MIN: CPT

## 2024-05-09 PROCEDURE — 72100 X-RAY EXAM L-S SPINE 2/3 VWS: CPT

## 2024-05-09 PROCEDURE — 73564 X-RAY EXAM KNEE 4 OR MORE: CPT | Mod: LT

## 2024-05-09 ASSESSMENT — CONFRONTATIONAL VISUAL FIELD TEST (CVF)
OS_FINDINGS: FULL
OD_FINDINGS: FULL

## 2024-05-10 PROBLEM — M17.12 ARTHRITIS OF LEFT KNEE: Status: ACTIVE | Noted: 2024-05-09

## 2024-05-10 PROBLEM — M47.816 DEGENERATIVE JOINT DISEASE (DJD) OF LUMBAR SPINE: Status: ACTIVE | Noted: 2024-05-09

## 2024-05-10 PROBLEM — M16.12 ARTHRITIS OF LEFT HIP: Status: ACTIVE | Noted: 2024-05-09

## 2024-05-10 NOTE — IMAGING
[Left] : left knee [Outside films reviewed] : Outside films reviewed [AP Standing] : anteroposterior standing [de-identified] : The patient is a well appearing 78 year  old male of their stated age. Patient ambulates with a normal gait.   Ribs: Deformity: None Tenderness to Palpation: None Rib Compression Test: Negative   Thoracic Spine: Range of Motion: Unrestricted Deformity: None Tenderness to Palpation: None Sensation: Intact to Light Touch   Lumbar Spine: RANGE OF MOTION:      Flexion: Mildly restricted and without pain      Extension: Mildly restricted and without pain      Rotation: Mildly restricted and without pain   TENDERNESS TO PALPATION:      Midline/Vertebral Bodies/Spinous Processes: Negative      Paraspinal Muscles: Nontender        SI Joints:  Negative   PROVOCATIVE TESTING:      Piriformis Stretch Test: Negative      Straight Leg Raise:  Negative       Seated Straight Leg Raise: Negative      Pelvic Compression Test: Negative       INSPECTION:         Deformity: Negative         Erythema: Negative         Ecchymosis: Negative         Abrasions: Negative         Muscle Spasm: Negative          NEUROLOGIC EXAM:         Sensation L2-S1: Grossly Intact         DTRs: 2+ and Symmetric         Babinski: Negative         Clonus: Negative   MOTOR EXAM:         Quadriceps: 5 out of 5         Hamstrings: 5 out of 5         Hip ABduction: 5 out of 5         Hip ADduction: 5 out of 5         Hip Flexion: 4+ out of 5         TA: 5 out of 5         GS: 5 out of 5         EHL: 5 out of 5         FHL: 5 out of 5   Circulatory/Pulses:         Dorsalis Pedis:      2+         Posterior Tibialis: 2+  >>>>>>>>>>>>>>>>>>>>>>>>>>>>>>>>>>>>>>>>>>>>>>>>>>>>>>>>>>>  Effected Knee: LEFT  ROM:  0-130 degrees Lachman: Negative Pivot Shift: Negative Anterior Drawer: Negative Posterior Drawer / Sag: Negative Varus Stress 0 degrees: Stable Varus Stress 30 degrees: Stable Valgus Stress 0 degrees: Stable Valgus Stress 30 degrees: Stable Medial Myrna: Negative Lateral Myrna: Negative Patella Glide: 2+ Patella Apprehension: Negative Patella Grind: POSITIVE  Pes Ovett Valgus: Negative Pes Cavus: Negative   Palpation: Medial Joint Line: Nontender Lateral Joint Line: Nontender Medial Collateral Ligament: Nontender Lateral Collateral Ligament/PLC: Nontender Distal Femur: Nontender Proximal Tibia: Nontender Tibial Tubercle: Nontender Gerdy's Tubercle: Nontender Distal Pole Patella: Nontender Quadriceps Tendon: Nontender &  Intact Patella Tendon: Nontender &  Intact Medial Femoral Condyle: Nontender Medial Distal Hamstring/PES: Nontender Lateral Distal Hamstring: Nontender & Stable Iliotibial Band: Nontender Medial Patellofemoral Ligament: Nontender Adductor: Nontender Proximal GSC-Plantaris: Nontender Calf: Supple & Nontender   Inspection: Deformity: No Erythema: No Ecchymosis: No Abrasions: No Effusion: No Prepatella Bursitis: No Neurologic Exam: Sensation L4-S1: Grossly Intact Motor Exam: Quadriceps: 5 out of 5 Hamstrings: 5 out of 5 EHL: 5 out of 5 FHL: 5 out of 5 TA: 5 out of 5 GS: 5 out of 5 Circulatory/Pulses: Dorsalis Pedis: 2+ Posterior Tibialis: 2+ Additional Pertinent Findings: None   Contralateral Knee:                            ROM: 0-145 degrees Other Pertinent Findings: None   Assessment: The patient is a 78 year old male with lower back and left leg pain and radiographic and physical exam findings consistent with mild knee and hip arthritis, lumbar neurologic claudication & DDD.  The patients condition is chronic.  Documents/Results Reviewed Today: Outside X-Ray left knee and lumbar spine, in office standing view, previous outside X-Ray left hip.  Tests/Studies Independently Interpreted Today: Outside X-Ray left knee (to include in office standing view) reveals evidence of mild medial and lateral joint line narrowing. Outside X-Ray lumbar spine reveals evidence of degenerative changes with disc space narrowing. Previous outside X-Ray left hip reveals evidence of mild degenerative changes.  Pertinent findings include: LEFT KNEE: 0-130, +patellofemoral grind. LUMBAR SPINE: mildly restricted range of motion.  Confounding medical conditions/concerns: Hx of left knee meniscectomy with Dr. Lamb 1/2023   Plan: Advised the patient that his clinical examination and report of symptoms are consistent with neurogenic claudication. Patient will start physical therapy, HEP, and stretching. We emphasized the importance of core strengthening and stretching. Discussed taking OTC anti-inflammatories as needed - use as directed. If any pain is worsening, we will discuss referral to pain management for epidural injections. Modify activity as discussed.  Tests Ordered: None  Prescription Medications Ordered: Discussed appropriate use of OTC anti-inflammatories and analgesics (including but not limited to Aleve, Advil, Tylenol, Motrin, Ibuprofen, Voltaren gel, etc.) Braces/DME Ordered: None Activity/Work/Sports Status: None Additional Instructions: Core strengthening  Follow-Up: 6 weeks   The patient's current medication management of their orthopedic diagnosis was reviewed today: (1) We discussed a comprehensive treatment plan that included possible pharmaceutical management involving the use of prescription strength medications including but not limited to options such as oral Naprosyn 500mg BID, once daily Meloxicam 15 mg, or 500-650 mg Tylenol versus over the counter oral medications and topical prescription NSAID Pennsaid vs over the counter Voltaren gel.  Based on our extensive discussion, the patient declined prescription medication and will use over the counter Advil, Alleve, Voltaren Gel or Tylenol as directed. (2) There is a moderate risk of morbidity with further treatment, especially from use of prescription strength medications and possible side effects of these medications which include upset stomach with oral medications, skin reactions to topical medications and cardiac/renal issues with long term use. (3) I recommended that the patient follow-up with their medical physician to discuss any significant specific potential issues with long term medication use such as interactions with current medications or with exacerbation of underlying medical comorbidities. (4) The benefits and risks associated with use of injectable, oral or topical, prescription and over the counter anti-inflammatory medications were discussed with the patient. The patient voiced understanding of the risks including but not limited to bleeding, stroke, kidney dysfunction, heart disease, and were referred to the black box warning label for further information.   Yuridia CARRENO attest that this documentation has been prepared under the direction and in the presence of Provider Dr. Jhon Lamb.   The documentation recorded by the scribe accurately reflects the services IDr. Jhon, personally performed and the decisions made by me.   [FreeTextEntry9] : Outside X-Ray left knee (to include in office standing view) reveals evidence of mild medial and lateral joint line narrowing

## 2024-05-10 NOTE — HISTORY OF PRESENT ILLNESS
[de-identified] : The patient is a 78 year  old right hand dominant male who presents today complaining of low back/left leg pain.  Date of Injury/Onset: 10/2023 Pain:    At Rest: 2/10  With Activity:  7-8/10  Mechanism of injury: Gradual onset of pain over time, possibly from walking a big dog (120lb) This is NOT a Work Related Injury being treated under Worker's Compensation. This is NOT an athletic injury occurring associated with an interscholastic or organized sports team. Quality of symptoms: lower back pain that radiates down the left leg to the ankle, denies n/t Improves with: heating pad  Worse with: prolonged walking Prior treatment: PCP Prior Imaging: xrays [facility] 12/2023 Out of work/sport: not currently working  School/Sport/Position/Occupation: retired Additional Information: L knee meniscectomy Dr Lamb 1/2023

## 2024-05-12 DIAGNOSIS — E11.9 TYPE 2 DIABETES MELLITUS WITHOUT COMPLICATIONS: ICD-10-CM

## 2024-05-12 DIAGNOSIS — G47.33 OBSTRUCTIVE SLEEP APNEA (ADULT) (PEDIATRIC): ICD-10-CM

## 2024-05-12 DIAGNOSIS — C15.9 MALIGNANT NEOPLASM OF ESOPHAGUS, UNSPECIFIED: ICD-10-CM

## 2024-05-12 DIAGNOSIS — I25.10 ATHEROSCLEROTIC HEART DISEASE OF NATIVE CORONARY ARTERY WITHOUT ANGINA PECTORIS: ICD-10-CM

## 2024-05-12 DIAGNOSIS — R05.9 COUGH, UNSPECIFIED: ICD-10-CM

## 2024-05-12 DIAGNOSIS — I10 ESSENTIAL (PRIMARY) HYPERTENSION: ICD-10-CM

## 2024-05-12 DIAGNOSIS — R50.9 FEVER, UNSPECIFIED: ICD-10-CM

## 2024-05-12 DIAGNOSIS — Z87.891 PERSONAL HISTORY OF NICOTINE DEPENDENCE: ICD-10-CM

## 2024-05-12 DIAGNOSIS — J44.9 CHRONIC OBSTRUCTIVE PULMONARY DISEASE, UNSPECIFIED: ICD-10-CM

## 2024-05-12 DIAGNOSIS — E78.5 HYPERLIPIDEMIA, UNSPECIFIED: ICD-10-CM

## 2024-05-20 ENCOUNTER — APPOINTMENT (OUTPATIENT)
Dept: PULMONOLOGY | Facility: CLINIC | Age: 79
End: 2024-05-20
Payer: MEDICARE

## 2024-05-20 VITALS
HEIGHT: 68 IN | WEIGHT: 170 LBS | SYSTOLIC BLOOD PRESSURE: 126 MMHG | BODY MASS INDEX: 25.76 KG/M2 | RESPIRATION RATE: 14 BRPM | HEART RATE: 74 BPM | DIASTOLIC BLOOD PRESSURE: 68 MMHG | OXYGEN SATURATION: 97 %

## 2024-05-20 DIAGNOSIS — J44.9 CHRONIC OBSTRUCTIVE PULMONARY DISEASE, UNSPECIFIED: ICD-10-CM

## 2024-05-20 DIAGNOSIS — G47.33 OBSTRUCTIVE SLEEP APNEA (ADULT) (PEDIATRIC): ICD-10-CM

## 2024-05-20 DIAGNOSIS — Z71.89 OTHER SPECIFIED COUNSELING: ICD-10-CM

## 2024-05-20 PROCEDURE — 99215 OFFICE O/P EST HI 40 MIN: CPT

## 2024-05-20 PROCEDURE — G2211 COMPLEX E/M VISIT ADD ON: CPT

## 2024-05-20 RX ORDER — DOXYCYCLINE HYCLATE 100 MG/1
100 TABLET ORAL
Qty: 14 | Refills: 0 | Status: DISCONTINUED | COMMUNITY
Start: 2024-05-07 | End: 2024-05-20

## 2024-05-20 RX ORDER — FLUTICASONE FUROATE, UMECLIDINIUM BROMIDE AND VILANTEROL TRIFENATATE 100; 62.5; 25 UG/1; UG/1; UG/1
100-62.5-25 POWDER RESPIRATORY (INHALATION)
Qty: 1 | Refills: 5 | Status: ACTIVE | COMMUNITY
Start: 2024-05-20 | End: 1900-01-01

## 2024-05-20 RX ORDER — METHYLPREDNISOLONE 4 MG/1
4 TABLET ORAL
Qty: 1 | Refills: 1 | Status: DISCONTINUED | COMMUNITY
Start: 2023-12-19 | End: 2024-05-20

## 2024-05-20 RX ORDER — PREDNISONE 10 MG/1
10 TABLET ORAL
Qty: 21 | Refills: 0 | Status: DISCONTINUED | COMMUNITY
Start: 2024-05-07 | End: 2024-05-20

## 2024-05-20 NOTE — HISTORY OF PRESENT ILLNESS
[Former] : former [Doing Well] : doing well [Obstructive Sleep Apnea] : obstructive sleep apnea [Lab] : lab [CPAP:] : CPAP [Nasal mask] : nasal mask [Difficulty Breathing During Exertion] : stable dyspnea on exertion [Feelings Of Weakness On Exertion] : stable exercise intolerance [Cough] : denies coughing [Coughing Up Sputum] : denies coughing up sputum [Wheezing] : denies wheezing [Regional Soft Tissue Swelling Both Lower Extremities] : denies lower extremity edema [More Frequent Use Needed Recently] : more frequent [___ Times a Week] : of [unfilled] time(s) a week [Adherent] : the patient is adherent with ~his/her~ medication regimen [TextBox_4] : Care assumed. Former pt of Dr Mason. Chart reviewed [TextBox_11] : 2 [TextBox_13] : 30 [YearQuit] : 1990 [de-identified] : LEILA, GERD, CAD, diabetes, prior esophageal carcinoma (2009) [FreeTextEntry3] : hurt leg  [Awakes Unrefreshed] : does not awaken unrefreshed [Awakes with Dry Mouth] : does not awaken with dry mouth [Snoring] : no snoring [Witnessed Apneas] : no witnessed apneas [TextBox_77] : 2338 [TextBox_79] : 4402 [TextBox_81] : 10 [TextBox_89] : 1 [TextBox_100] : 1/29/2015 [TextBox_108] : 43 [TextBox_120] : CPAP titrated to 8 [TextBox_104] : 3/26/2015 [TextBox_131] : 11 [TextBox_127] : 4/20/2024 [TextBox_129] : 5/19/2024 [TextBox_133] : 63 [TextBox_137] : 60 [TextBox_141] : 6 [TextBox_143] : 27 [TextBox_147] : 0.3 [TextBox_158] : [employer] [TextBox_160] : And 20 [TextBox_162] : 8/23

## 2024-05-20 NOTE — RESULTS/DATA
[TextEntry] : 4/25/2023: [facility]-emphysematous changes.  Accentuated groundglass opacities in the lower lobes with surrounding fibrotic interstitial changes.  No recurrent esophageal disease noted  ACC: [id number] EXAM: XR CHEST PORTABLE URGENT 1V ORDERED BY: MICKIE IZAGUIRRE PROCEDURE DATE: 05/06/2024 INTERPRETATION: AP chest on May 6, 2024 2:43 PM. Patient has increasing productive cough. Heart magnified by technique. Large hiatal hernia in the right lower chest again noted. Again noted is surgical rib resection of the right sixth rib posteromedially. Lungs are clear. Chest is similar to May 24, 2022. IMPRESSION: Prior right chest surgery. Large hiatal hernia again noted. No acute finding --- End of Report --- JANELL HANSON MD; Attending Radiologist This document has been electronically signed. May 6 2024 2:58PM

## 2024-05-20 NOTE — DISCUSSION/SUMMARY
[COPD] : chronic obstructive pulmonary disease [Decompensated] : decompensated [PFTs] : pulmonary function tests [Medication Changes Per Orders] : Medication changes are as documented in orders [Obstructive Sleep Apnea] : obstructive sleep apnea [Responding to Treatment] : responding to treatment [Alcohol Avoidance] : alcohol avoidance [Sedative Avoidance] : sedative avoidance [Weight Loss Program] : weight loss program [Patient] : discussed with the patient [de-identified] : renew supplies

## 2024-05-21 ENCOUNTER — OFFICE (OUTPATIENT)
Dept: URBAN - METROPOLITAN AREA CLINIC 12 | Facility: CLINIC | Age: 79
Setting detail: OPHTHALMOLOGY
End: 2024-05-21
Payer: MEDICARE

## 2024-05-21 DIAGNOSIS — H25.13: ICD-10-CM

## 2024-05-21 DIAGNOSIS — H59.811: ICD-10-CM

## 2024-05-21 DIAGNOSIS — H43.393: ICD-10-CM

## 2024-05-21 DIAGNOSIS — H02.403: ICD-10-CM

## 2024-05-21 DIAGNOSIS — H35.372: ICD-10-CM

## 2024-05-21 PROCEDURE — 92134 CPTRZ OPH DX IMG PST SGM RTA: CPT | Performed by: OPHTHALMOLOGY

## 2024-05-21 PROCEDURE — 99214 OFFICE O/P EST MOD 30 MIN: CPT | Performed by: OPHTHALMOLOGY

## 2024-05-21 ASSESSMENT — CONFRONTATIONAL VISUAL FIELD TEST (CVF)
OD_FINDINGS: FULL
OS_FINDINGS: FULL

## 2024-05-21 ASSESSMENT — LID POSITION - PTOSIS
OD_PTOSIS: RUL 1+ 2+
OS_PTOSIS: LUL 1+ 2+

## 2024-06-20 ENCOUNTER — APPOINTMENT (OUTPATIENT)
Dept: ORTHOPEDIC SURGERY | Facility: CLINIC | Age: 79
End: 2024-06-20

## 2024-06-20 VITALS — WEIGHT: 170 LBS | HEIGHT: 68 IN | BODY MASS INDEX: 25.76 KG/M2

## 2024-06-20 DIAGNOSIS — R29.818 OTHER SYMPTOMS AND SIGNS INVOLVING THE NERVOUS SYSTEM: ICD-10-CM

## 2024-06-20 DIAGNOSIS — M54.50 LOW BACK PAIN, UNSPECIFIED: ICD-10-CM

## 2024-06-21 ENCOUNTER — OFFICE (OUTPATIENT)
Dept: URBAN - METROPOLITAN AREA CLINIC 12 | Facility: CLINIC | Age: 79
Setting detail: OPHTHALMOLOGY
End: 2024-06-21
Payer: MEDICARE

## 2024-06-21 DIAGNOSIS — H25.13: ICD-10-CM

## 2024-06-21 DIAGNOSIS — H25.12: ICD-10-CM

## 2024-06-21 PROCEDURE — 92136 OPHTHALMIC BIOMETRY: CPT | Mod: TC | Performed by: OPHTHALMOLOGY

## 2024-06-21 PROCEDURE — 92136 OPHTHALMIC BIOMETRY: CPT | Mod: 26,LT | Performed by: OPHTHALMOLOGY

## 2024-06-21 PROCEDURE — 99213 OFFICE O/P EST LOW 20 MIN: CPT | Performed by: OPHTHALMOLOGY

## 2024-06-21 ASSESSMENT — LID POSITION - PTOSIS
OS_PTOSIS: LUL 1+ 2+
OD_PTOSIS: RUL 1+ 2+

## 2024-06-21 ASSESSMENT — CONFRONTATIONAL VISUAL FIELD TEST (CVF)
OD_FINDINGS: FULL
OS_FINDINGS: FULL

## 2024-06-21 NOTE — IMAGING
[de-identified] : The patient is a well appearing 78 year  old male of their stated age. Patient ambulates with a normal gait.   Ribs: Deformity: None Tenderness to Palpation: None Rib Compression Test: Negative   Thoracic Spine: Range of Motion: Unrestricted Deformity: None Tenderness to Palpation: None Sensation: Intact to Light Touch   Lumbar Spine: RANGE OF MOTION:      Flexion: Mildly restricted and without pain      Extension: Mildly restricted and without pain      Rotation: Mildly restricted and without pain   TENDERNESS TO PALPATION:      Midline/Vertebral Bodies/Spinous Processes: TENDER, MIDLINE LUMBAR L4-L5 REGION       Paraspinal Muscles: Nontender        SI Joints:  Negative   PROVOCATIVE TESTING:      Piriformis Stretch Test: Negative      Straight Leg Raise:  Negative       Seated Straight Leg Raise: Negative      Pelvic Compression Test: Negative       INSPECTION:         Deformity: Negative         Erythema: Negative         Ecchymosis: Negative         Abrasions: Negative         Muscle Spasm: Negative          NEUROLOGIC EXAM:         Sensation L2-S1: Grossly Intact         DTRs: 2+ and Symmetric         Babinski: Negative         Clonus: Negative   MOTOR EXAM:         Quadriceps: 5 out of 5         Hamstrings: 5 out of 5         Hip ABduction: 5 out of 5         Hip ADduction: 5 out of 5         Hip Flexion: 5 out of 5         TA: 5 out of 5         GS: 5 out of 5         EHL: 5 out of 5         FHL: 5 out of 5   Circulatory/Pulses:         Dorsalis Pedis:      2+         Posterior Tibialis: 2+  Assessment: The patient is a 78 year old male with lower back and left leg pain and radiographic and physical exam findings consistent with mild knee and hip arthritis, lumbar neurologic claudication & DDD.  The patients condition is chronic.  Documents/Results Reviewed Today: None  Tests/Studies Independently Interpreted Today: None   Pertinent findings include: LEFT KNEE: 0-130, +patellofemoral grind. LUMBAR SPINE: -SLR on the left, 5/5 hip flexion strength, tender midline lumbar L4-L5 region.   Confounding medical conditions/concerns: Hx of left knee meniscectomy with Dr. Lamb 1/2023   Plan: The patient reports gradual, interval improvement in pain and mechanical symptoms related to lumbar neurologic claudication & DDD. He will continue physical therapy, HEP, and stretching. We discussed the indication of facet block however, considering he is doing well with physical therapy the patient decides to continue with current course. Discussed taking OTC anti-inflammatories as needed - use as directed. If any pain is worsening, recommended the patient consider a referral to pain management for epidural injections. Modify activity as discussed.  Tests Ordered: None  Prescription Medications Ordered: Discussed appropriate use of OTC anti-inflammatories and analgesics (including but not limited to Aleve, Advil, Tylenol, Motrin, Ibuprofen, Voltaren gel, etc.) Braces/DME Ordered: None Activity/Work/Sports Status: None Additional Instructions: Core strengthening  Follow-Up: PRN   The patient's current medication management of their orthopedic diagnosis was reviewed today: (1) We discussed a comprehensive treatment plan that included possible pharmaceutical management involving the use of prescription strength medications including but not limited to options such as oral Naprosyn 500mg BID, once daily Meloxicam 15 mg, or 500-650 mg Tylenol versus over the counter oral medications and topical prescription NSAID Pennsaid vs over the counter Voltaren gel.  Based on our extensive discussion, the patient declined prescription medication and will use over the counter Advil, Alleve, Voltaren Gel or Tylenol as directed. (2) There is a moderate risk of morbidity with further treatment, especially from use of prescription strength medications and possible side effects of these medications which include upset stomach with oral medications, skin reactions to topical medications and cardiac/renal issues with long term use. (3) I recommended that the patient follow-up with their medical physician to discuss any significant specific potential issues with long term medication use such as interactions with current medications or with exacerbation of underlying medical comorbidities. (4) The benefits and risks associated with use of injectable, oral or topical, prescription and over the counter anti-inflammatory medications were discussed with the patient. The patient voiced understanding of the risks including but not limited to bleeding, stroke, kidney dysfunction, heart disease, and were referred to the black box warning label for further information.   Yuridia CARRENO attest that this documentation has been prepared under the direction and in the presence of Provider Dr. Jhon Lamb.   The documentation recorded by the scribe accurately reflects the service WAI Carty PA-C personally performed and the decisions made by me. The patient was seen by me under the direct supervision of Dr. Jhon Lamb

## 2024-06-21 NOTE — HISTORY OF PRESENT ILLNESS
[de-identified] : The patient is a 78 year  old right hand dominant male who presents today complaining of low back/left leg pain.  Date of Injury/Onset: 10/2023 Pain:    At Rest: 2/10  With Activity:  10/10  Mechanism of injury: Gradual onset of pain over time, possibly from walking a big dog (120lb) This is NOT a Work Related Injury being treated under Worker's Compensation. This is NOT an athletic injury occurring associated with an interscholastic or organized sports team. Quality of symptoms: lower back pain that radiates down the left leg to the ankle, denies n/t Improves with: heating pad  Worse with: prolonged walking Treatment/Imaging/Studies Since Last Visit: PT 3x/week 	Reports Available For Review Today: none Out of work/sport: not currently working  School/Sport/Position/Occupation: retired Changes since last visit: States he feels better but his pain levels have increased. In PT 3x/week. Wife states he has been limping. States two days he had difficulty walking due to weakness in both legs.  Additional Information: L knee meniscectomy Dr Lamb 1/2023

## 2024-07-04 NOTE — ASU PATIENT PROFILE, ADULT - DOES PATIENT HAVE ADVANCE DIRECTIVE
Problem: Discharge Planning  Goal: Discharge to home or other facility with appropriate resources  Outcome: Progressing     Problem: Pain  Goal: Verbalizes/displays adequate comfort level or baseline comfort level  Outcome: Progressing     Problem: Safety - Adult  Goal: Free from fall injury  Outcome: Progressing      Yes

## 2024-07-08 ENCOUNTER — ASC (OUTPATIENT)
Dept: URBAN - METROPOLITAN AREA SURGERY 8 | Facility: SURGERY | Age: 79
Setting detail: OPHTHALMOLOGY
End: 2024-07-08
Payer: MEDICARE

## 2024-07-08 DIAGNOSIS — H52.212: ICD-10-CM

## 2024-07-08 DIAGNOSIS — H25.12: ICD-10-CM

## 2024-07-08 PROCEDURE — 66984 XCAPSL CTRC RMVL W/O ECP: CPT | Mod: LT | Performed by: OPHTHALMOLOGY

## 2024-07-08 PROCEDURE — A9270 NON-COVERED ITEM OR SERVICE: HCPCS | Mod: GY | Performed by: OPHTHALMOLOGY

## 2024-07-08 PROCEDURE — FEMTO PRECISION LASER CATARACT SURGERY: Mod: GY | Performed by: OPHTHALMOLOGY

## 2024-07-09 ENCOUNTER — RX ONLY (RX ONLY)
Age: 79
End: 2024-07-09

## 2024-07-09 ENCOUNTER — OFFICE (OUTPATIENT)
Dept: URBAN - METROPOLITAN AREA CLINIC 12 | Facility: CLINIC | Age: 79
Setting detail: OPHTHALMOLOGY
End: 2024-07-09
Payer: MEDICARE

## 2024-07-09 DIAGNOSIS — Z96.1: ICD-10-CM

## 2024-07-09 PROCEDURE — 99024 POSTOP FOLLOW-UP VISIT: CPT | Performed by: OPTOMETRIST

## 2024-07-09 ASSESSMENT — LID POSITION - PTOSIS
OS_PTOSIS: LUL 1+ 2+
OD_PTOSIS: RUL 1+ 2+

## 2024-07-09 ASSESSMENT — CONFRONTATIONAL VISUAL FIELD TEST (CVF)
OD_FINDINGS: FULL
OS_FINDINGS: FULL

## 2024-07-16 ENCOUNTER — APPOINTMENT (OUTPATIENT)
Dept: UROLOGY | Facility: CLINIC | Age: 79
End: 2024-07-16
Payer: MEDICARE

## 2024-07-16 VITALS
DIASTOLIC BLOOD PRESSURE: 75 MMHG | SYSTOLIC BLOOD PRESSURE: 147 MMHG | HEART RATE: 59 BPM | WEIGHT: 170 LBS | BODY MASS INDEX: 25.85 KG/M2

## 2024-07-16 DIAGNOSIS — N28.1 CYST OF KIDNEY, ACQUIRED: ICD-10-CM

## 2024-07-16 DIAGNOSIS — N13.8 BENIGN PROSTATIC HYPERPLASIA WITH LOWER URINARY TRACT SYMPMS: ICD-10-CM

## 2024-07-16 DIAGNOSIS — R39.9 UNSPECIFIED SYMPTOMS AND SIGNS INVOLVING THE GENITOURINARY SYSTEM: ICD-10-CM

## 2024-07-16 DIAGNOSIS — N40.1 BENIGN PROSTATIC HYPERPLASIA WITH LOWER URINARY TRACT SYMPMS: ICD-10-CM

## 2024-07-16 PROCEDURE — 99204 OFFICE O/P NEW MOD 45 MIN: CPT

## 2024-07-16 RX ORDER — METOPROLOL TARTRATE 75 MG/1
TABLET, FILM COATED ORAL
Refills: 0 | Status: ACTIVE | COMMUNITY

## 2024-07-16 RX ORDER — GARLIC EXTRACT 500 MG
CAPSULE ORAL
Refills: 0 | Status: ACTIVE | COMMUNITY

## 2024-07-16 RX ORDER — DEXLANSOPRAZOLE 60 MG/1
60 CAPSULE, DELAYED RELEASE ORAL
Refills: 0 | Status: ACTIVE | COMMUNITY

## 2024-07-17 ENCOUNTER — APPOINTMENT (OUTPATIENT)
Dept: PULMONOLOGY | Facility: CLINIC | Age: 79
End: 2024-07-17
Payer: MEDICARE

## 2024-07-17 ENCOUNTER — OFFICE (OUTPATIENT)
Dept: URBAN - METROPOLITAN AREA CLINIC 12 | Facility: CLINIC | Age: 79
Setting detail: OPHTHALMOLOGY
End: 2024-07-17
Payer: MEDICARE

## 2024-07-17 VITALS
RESPIRATION RATE: 16 BRPM | DIASTOLIC BLOOD PRESSURE: 80 MMHG | HEART RATE: 60 BPM | BODY MASS INDEX: 27.98 KG/M2 | WEIGHT: 170 LBS | HEIGHT: 65.5 IN | OXYGEN SATURATION: 88 % | SYSTOLIC BLOOD PRESSURE: 130 MMHG

## 2024-07-17 VITALS — WEIGHT: 170 LBS | BODY MASS INDEX: 27.98 KG/M2 | HEIGHT: 65.5 IN

## 2024-07-17 DIAGNOSIS — J44.1 CHRONIC OBSTRUCTIVE PULMONARY DISEASE WITH (ACUTE) EXACERBATION: ICD-10-CM

## 2024-07-17 DIAGNOSIS — H25.11: ICD-10-CM

## 2024-07-17 LAB
APPEARANCE: CLEAR
BACTERIA: NEGATIVE /HPF
BILIRUBIN URINE: NEGATIVE
BLOOD URINE: NEGATIVE
CAST: 2 /LPF
COLOR: YELLOW
EPITHELIAL CELLS: 0 /HPF
GLUCOSE QUALITATIVE U: NEGATIVE MG/DL
KETONES URINE: NEGATIVE MG/DL
LEUKOCYTE ESTERASE URINE: NEGATIVE
MICROSCOPIC-UA: NORMAL
NITRITE URINE: NEGATIVE
PH URINE: 6.5
PROTEIN URINE: 30 MG/DL
RED BLOOD CELLS URINE: 0 /HPF
SPECIFIC GRAVITY URINE: 1.01
UROBILINOGEN URINE: 0.2 MG/DL
WHITE BLOOD CELLS URINE: 0 /HPF

## 2024-07-17 PROCEDURE — 99213 OFFICE O/P EST LOW 20 MIN: CPT

## 2024-07-17 PROCEDURE — G2211 COMPLEX E/M VISIT ADD ON: CPT

## 2024-07-17 PROCEDURE — 92136 OPHTHALMIC BIOMETRY: CPT | Mod: 26,RT | Performed by: OPHTHALMOLOGY

## 2024-07-17 ASSESSMENT — LID POSITION - PTOSIS
OD_PTOSIS: RUL 1+ 2+
OS_PTOSIS: LUL 1+ 2+

## 2024-07-17 ASSESSMENT — CONFRONTATIONAL VISUAL FIELD TEST (CVF)
OD_FINDINGS: FULL
OS_FINDINGS: FULL

## 2024-07-18 LAB
BACTERIA UR CULT: NORMAL
URINE CYTOLOGY: NORMAL

## 2024-07-22 ENCOUNTER — ASC (OUTPATIENT)
Dept: URBAN - METROPOLITAN AREA SURGERY 8 | Facility: SURGERY | Age: 79
Setting detail: OPHTHALMOLOGY
End: 2024-07-22
Payer: MEDICARE

## 2024-07-22 DIAGNOSIS — H25.11: ICD-10-CM

## 2024-07-22 DIAGNOSIS — H52.211: ICD-10-CM

## 2024-07-22 PROCEDURE — 66984 XCAPSL CTRC RMVL W/O ECP: CPT | Mod: 79,RT | Performed by: OPHTHALMOLOGY

## 2024-07-22 PROCEDURE — A9270 NON-COVERED ITEM OR SERVICE: HCPCS | Mod: GY | Performed by: OPHTHALMOLOGY

## 2024-07-22 PROCEDURE — FEMTO PRECISION LASER CATARACT SURGERY: Mod: GY | Performed by: OPHTHALMOLOGY

## 2024-07-23 ENCOUNTER — RX ONLY (RX ONLY)
Age: 79
End: 2024-07-23

## 2024-07-23 ENCOUNTER — OFFICE (OUTPATIENT)
Dept: URBAN - METROPOLITAN AREA CLINIC 12 | Facility: CLINIC | Age: 79
Setting detail: OPHTHALMOLOGY
End: 2024-07-23
Payer: MEDICARE

## 2024-07-23 DIAGNOSIS — Z96.1: ICD-10-CM

## 2024-07-23 PROCEDURE — 99024 POSTOP FOLLOW-UP VISIT: CPT | Performed by: OPTOMETRIST

## 2024-07-23 ASSESSMENT — CONFRONTATIONAL VISUAL FIELD TEST (CVF)
OS_FINDINGS: FULL
OD_FINDINGS: FULL

## 2024-07-23 ASSESSMENT — LID POSITION - PTOSIS
OS_PTOSIS: LUL 1+ 2+
OD_PTOSIS: RUL 1+ 2+

## 2024-07-30 ENCOUNTER — OFFICE (OUTPATIENT)
Dept: URBAN - METROPOLITAN AREA CLINIC 12 | Facility: CLINIC | Age: 79
Setting detail: OPHTHALMOLOGY
End: 2024-07-30
Payer: MEDICARE

## 2024-07-30 DIAGNOSIS — G47.33 OBSTRUCTIVE SLEEP APNEA (ADULT) (PEDIATRIC): ICD-10-CM

## 2024-07-30 DIAGNOSIS — Z96.1: ICD-10-CM

## 2024-07-30 PROCEDURE — 99024 POSTOP FOLLOW-UP VISIT: CPT | Performed by: OPTOMETRIST

## 2024-07-30 ASSESSMENT — LID POSITION - PTOSIS
OD_PTOSIS: RUL 1+ 2+
OS_PTOSIS: LUL 1+ 2+

## 2024-07-30 ASSESSMENT — CONFRONTATIONAL VISUAL FIELD TEST (CVF)
OS_FINDINGS: FULL
OD_FINDINGS: FULL

## 2024-08-06 ENCOUNTER — OFFICE (OUTPATIENT)
Dept: URBAN - METROPOLITAN AREA CLINIC 6 | Facility: CLINIC | Age: 79
Setting detail: OPHTHALMOLOGY
End: 2024-08-06
Payer: MEDICARE

## 2024-08-06 DIAGNOSIS — Z96.1: ICD-10-CM

## 2024-08-06 DIAGNOSIS — H20.00: ICD-10-CM

## 2024-08-06 PROCEDURE — 99024 POSTOP FOLLOW-UP VISIT: CPT | Performed by: OPHTHALMOLOGY

## 2024-08-06 ASSESSMENT — CONFRONTATIONAL VISUAL FIELD TEST (CVF)
OS_FINDINGS: FULL
OD_FINDINGS: FULL

## 2024-08-06 ASSESSMENT — LID POSITION - PTOSIS
OS_PTOSIS: LUL 1+ 2+
OD_PTOSIS: RUL 1+ 2+

## 2024-08-09 ENCOUNTER — OUTPATIENT (OUTPATIENT)
Dept: OUTPATIENT SERVICES | Facility: HOSPITAL | Age: 79
LOS: 1 days | End: 2024-08-09
Payer: MEDICARE

## 2024-08-09 DIAGNOSIS — Z98.890 OTHER SPECIFIED POSTPROCEDURAL STATES: Chronic | ICD-10-CM

## 2024-08-09 DIAGNOSIS — Z98.89 OTHER SPECIFIED POSTPROCEDURAL STATES: Chronic | ICD-10-CM

## 2024-08-09 DIAGNOSIS — G47.33 OBSTRUCTIVE SLEEP APNEA (ADULT) (PEDIATRIC): ICD-10-CM

## 2024-08-09 PROCEDURE — 95800 SLP STDY UNATTENDED: CPT

## 2024-08-09 PROCEDURE — G0400: CPT | Mod: 26

## 2024-08-15 ENCOUNTER — OFFICE (OUTPATIENT)
Dept: URBAN - METROPOLITAN AREA CLINIC 12 | Facility: CLINIC | Age: 79
Setting detail: OPHTHALMOLOGY
End: 2024-08-15
Payer: MEDICARE

## 2024-08-15 DIAGNOSIS — H20.00: ICD-10-CM

## 2024-08-15 DIAGNOSIS — Z96.1: ICD-10-CM

## 2024-08-15 PROBLEM — E11.9 DIABETES TYPE 2 NO RETINOPATHY ; BOTH EYES: Status: ACTIVE | Noted: 2024-08-15

## 2024-08-15 PROCEDURE — 99024 POSTOP FOLLOW-UP VISIT: CPT | Performed by: OPHTHALMOLOGY

## 2024-08-15 ASSESSMENT — CONFRONTATIONAL VISUAL FIELD TEST (CVF)
OD_FINDINGS: FULL
OS_FINDINGS: FULL

## 2024-08-15 ASSESSMENT — LID POSITION - PTOSIS
OS_PTOSIS: LUL 1+ 2+
OD_PTOSIS: RUL 1+ 2+

## 2024-08-20 ENCOUNTER — NON-APPOINTMENT (OUTPATIENT)
Age: 79
End: 2024-08-20

## 2024-09-05 ENCOUNTER — OFFICE (OUTPATIENT)
Dept: URBAN - METROPOLITAN AREA CLINIC 12 | Facility: CLINIC | Age: 79
Setting detail: OPHTHALMOLOGY
End: 2024-09-05
Payer: MEDICARE

## 2024-09-05 DIAGNOSIS — H20.00: ICD-10-CM

## 2024-09-05 DIAGNOSIS — Z96.1: ICD-10-CM

## 2024-09-05 PROCEDURE — 99024 POSTOP FOLLOW-UP VISIT: CPT | Performed by: OPHTHALMOLOGY

## 2024-09-05 ASSESSMENT — CONFRONTATIONAL VISUAL FIELD TEST (CVF)
OS_FINDINGS: FULL
OD_FINDINGS: FULL

## 2024-09-05 ASSESSMENT — LID POSITION - PTOSIS
OS_PTOSIS: LUL 1+ 2+
OD_PTOSIS: RUL 1+ 2+

## 2024-09-11 ENCOUNTER — APPOINTMENT (OUTPATIENT)
Dept: ORTHOPEDIC SURGERY | Facility: CLINIC | Age: 79
End: 2024-09-11

## 2024-09-11 DIAGNOSIS — M65.30 TRIGGER FINGER, UNSPECIFIED FINGER: ICD-10-CM

## 2024-09-11 DIAGNOSIS — M79.643 PAIN IN UNSPECIFIED HAND: ICD-10-CM

## 2024-09-11 DIAGNOSIS — M79.646 PAIN IN UNSPECIFIED HAND: ICD-10-CM

## 2024-09-11 PROCEDURE — 20550 NJX 1 TENDON SHEATH/LIGAMENT: CPT | Mod: F2

## 2024-09-11 PROCEDURE — 99214 OFFICE O/P EST MOD 30 MIN: CPT | Mod: 25

## 2024-09-11 NOTE — ASSESSMENT
[FreeTextEntry1] : ASSESSMENT: [The patient was accompanied today and was assisted with explaining their complaints today.] The patient comes in today with a chronic exacerbated greater than 1 year history of worsening pain and stiffness in multiple fingers. Injections have been beneficial, however left middle finger symptoms have returned. Symptoms today are consistent with left middle trigger finger. Treatment modalities were discussed, the patient elects for an injection. Activity modifications were discussed as well.   The patient was adequately and thoroughly informed of my assessment of their current condition(s).  - This may diminish bodily function for the extremity.  We discussed prognosis, treatment modalities including operative and nonoperative options for the above diagnostic assessment. As always, 2nd opinion is always provided as an option. For this, when accessible, I was able to review other physicians note(s) including reviewing other tests, imaging results as well as personally view these results for my own interpretation.     Injection:   The risks and benefits of a steroid injection were discussed in detail. The risks include but are not limited to: pain, infection, swelling, flare response, bleeding, subcutaneous fat atrophy, skin depigmentation and/or elevation of blood sugar. The risk of incomplete resolution of symptoms, recurrence and additional intervention was reviewed and considered by the patient.  The patient agreed to proceed and under a sterile prep, I injected 1 unit (6mg) into 1 cc of a combination of Celestone and Lidocaine into the [left middle trigger]. The patient tolerated the injection well.   The patient was adequately and thoroughly informed of my assessment of their current condition(s).    DISCUSSION: 1.  Injection as above.  Activity modifications. 2. [x] 3. [x]

## 2024-09-11 NOTE — PHYSICAL EXAM
[de-identified] : Examination of the hands particularly at the A1 of the left thumb, left index, left middle reveals tenderness with a palpable click. [de-identified] : [4] views of [bilateral hands and wrists] were reviewed today in my office and were seen by me and discussed with the patient. These [show findings consistent with bilateral basal joint OA and findings of IP joint OA].

## 2024-09-11 NOTE — HISTORY OF PRESENT ILLNESS
[FreeTextEntry1] : Jhonny is a pleasant 78-year-old male who presents today with a chronic exacerbated greater than 1 year history of worsening pain and stiffness in multiple fingers. Injections have been beneficial, however left middle finger symptoms have returned

## 2024-09-26 ENCOUNTER — APPOINTMENT (OUTPATIENT)
Dept: PULMONOLOGY | Facility: CLINIC | Age: 79
End: 2024-09-26
Payer: MEDICARE

## 2024-09-26 VITALS — BODY MASS INDEX: 27.65 KG/M2 | WEIGHT: 168 LBS | HEIGHT: 65.5 IN

## 2024-09-26 VITALS
HEART RATE: 68 BPM | DIASTOLIC BLOOD PRESSURE: 74 MMHG | OXYGEN SATURATION: 96 % | RESPIRATION RATE: 16 BRPM | SYSTOLIC BLOOD PRESSURE: 132 MMHG

## 2024-09-26 DIAGNOSIS — G47.33 OBSTRUCTIVE SLEEP APNEA (ADULT) (PEDIATRIC): ICD-10-CM

## 2024-09-26 DIAGNOSIS — Z71.89 OTHER SPECIFIED COUNSELING: ICD-10-CM

## 2024-09-26 PROCEDURE — 99214 OFFICE O/P EST MOD 30 MIN: CPT | Mod: 25

## 2024-09-26 PROCEDURE — 94010 BREATHING CAPACITY TEST: CPT

## 2024-09-26 RX ORDER — FLUTICASONE PROPIONATE AND SALMETEROL 250; 50 UG/1; UG/1
250-50 POWDER RESPIRATORY (INHALATION)
Qty: 60 | Refills: 5 | Status: ACTIVE | COMMUNITY
Start: 2024-09-26

## 2024-09-26 NOTE — RESULTS/DATA
[TextEntry] : 4/25/2023: [facility]-emphysematous changes.  Accentuated groundglass opacities in the lower lobes with surrounding fibrotic interstitial changes.  No recurrent esophageal disease noted  ACC: [id number] EXAM: XR CHEST PORTABLE URGENT 1V ORDERED BY: MICKIE IZAGUIRRE PROCEDURE DATE: 05/06/2024 INTERPRETATION: AP chest on May 6, 2024 2:43 PM. Patient has increasing productive cough. Heart magnified by technique. Large hiatal hernia in the right lower chest again noted. Again noted is surgical rib resection of the right sixth rib posteromedially. Lungs are clear. Chest is similar to May 24, 2022. IMPRESSION: Prior right chest surgery. Large hiatal hernia again noted. No acute finding --- End of Report --- JANELL HANSON MD; Attending Radiologist This document has been electronically signed. May 6 2024 2:58PM

## 2024-09-26 NOTE — HISTORY OF PRESENT ILLNESS
[Former] : former [Obstructive Sleep Apnea] : obstructive sleep apnea [Lab] : lab [CPAP:] : CPAP [Nasal mask] : nasal mask [Doing Well] : doing well [Coughing Up Sputum] : denies coughing up sputum [Wheezing] : denies wheezing [Regional Soft Tissue Swelling Both Lower Extremities] : denies lower extremity edema [More Frequent Use Needed Recently] : more frequent [___ Times a Week] : of [unfilled] time(s) a week [Difficulty Breathing During Exertion] : resolved dyspnea on exertion [Feelings Of Weakness On Exertion] : resolved exercise intolerance [Cough] : resolved coughing [Unable To Manage Meds] : inability to manage ~his/her~ medications [TextBox_11] : 2 [TextBox_13] : 30 [YearQuit] : 1990 [Awakes Unrefreshed] : does not awaken unrefreshed [Awakes with Dry Mouth] : does not awaken with dry mouth [Snoring] : no snoring [Witnessed Apneas] : no witnessed apneas [TextBox_77] : 9520 [TextBox_79] : 4553 [TextBox_81] : 10 [TextBox_89] : 1 [TextBox_100] : 1/29/2015 [TextBox_108] : 43 [TextBox_120] : CPAP titrated to 8 [TextBox_104] : 3/26/2015 [TextBox_131] : 11 [TextBox_127] : 4/20/2024 [TextBox_129] : 5/19/2024 [TextBox_133] : 63 [TextBox_137] : 60 [TextBox_141] : 6 [TextBox_147] : 0.3 [TextBox_143] : 27 [TextBox_158] : [employer] [TextBox_160] : N20 [TextBox_162] : 8/23 [TextBox_165] : 8/9/2024: Unattended sleep study demonstrated an apnea-hypopnea index of 0.6 events per hour with a total sleep time of 500 minutes.  Transient desaturation to 85%. [Adherent] : the patient is not adherent with ~his/her~ medication regimen [de-identified] : LEILA, GERD, CAD, diabetes, prior esophageal carcinoma (2009) [de-identified] : UCC with AECOPD and completed 5 days of 50mg pred/day [FreeTextEntry3] : Wife states that he frequently misses doses of Wixela

## 2024-09-26 NOTE — PROCEDURE
[FreeTextEntry1] : 6/2/2022: PFTs normal 9/26/2024: Spirometry demonstrates approximately 300 cc decrease in forced vital capacity and FEV1 when compared to previous studies from 2022.  These values still remain within normal limits  7/17/2024: Patient's pulse ox was repeated on room air at rest and found to be 96%.  He was ambulated 300 feet with no change in saturation

## 2024-09-26 NOTE — DISCUSSION/SUMMARY
[COPD] : chronic obstructive pulmonary disease [Responding to Treatment] : responding to treatment [None] : There are no changes in medication management [Obstructive Sleep Apnea] : obstructive sleep apnea [Alcohol Avoidance] : alcohol avoidance [Sedative Avoidance] : sedative avoidance [Patient] : discussed with the patient [Compensated] : compensated [PFTs] : pulmonary function tests [de-identified] : Status post exacerbation [de-identified] : Resolved on recent unattended sleep study [de-identified] : Discontinue CPAP

## 2024-09-27 NOTE — DISCHARGE NOTE NURSING/CASE MANAGEMENT/SOCIAL WORK - NSDCVIVACCINE_GEN_ALL_CORE_FT
[FreeTextEntry1] : spirometry: normal  (no box available in [place] office today)    	 ACC: [id number]     EXAM:  CT CHEST   ORDERED BY: BETTIE MORRISON  PROCEDURE DATE:  06/26/2024    INTERPRETATION:  CT CHEST WITHOUT CONTRAST  INDICATION: Lung mass.  TECHNIQUE: Unenhanced helical images were obtained of the chest. Coronal and sagittal images were reconstructed. Maximum intensity projection images were generated.    COMPARISON: Radiograph chest 12/25/2023. CT chest 12/21/2023.  FINDINGS:  Tubes/Lines: None.  Lungs, airways and pleura: In the left upper lobe is a spiculated 3.2 x 2.8 x 4.5 cm spiculated mass within the apical division of the apicoposterior segment of the left upper lobe.  Emphysema. The biapical opacities and calcifications are unchanged. There are bilateral peripheral reticular opacities and calcified peripheral right lower lobe opacities that are unchanged. The airways are unremarkable. No pleural effusion. No pneumothorax.  Mediastinum: Left hemithyroidectomy. The chest lymph nodes measure less than 10 mm in the short axis. Unremarkable esophagus. The heart is normal in size. Aortic valve calcifications. Coronary artery calcifications. Aorta is normal in caliber. Aortic calcifications.  Upper Abdomen: The upper abdomen is unremarkable.  Bones And Soft Tissues: Spondylosis of the thoracic spine.  The soft tissues are unremarkable.   IMPRESSION:  1.  The left upper lobe lesion is concerning for primary lung cancer.  --- End of Report ---      MELISSA PINK MD; Attending Radiologist This document has been electronically signed. Jul 4 2024  5:33PM No Vaccines Administered.

## 2024-10-02 ENCOUNTER — APPOINTMENT (OUTPATIENT)
Dept: INTERNAL MEDICINE | Facility: CLINIC | Age: 79
End: 2024-10-02
Payer: MEDICARE

## 2024-10-02 VITALS
RESPIRATION RATE: 18 BRPM | HEIGHT: 65.5 IN | HEART RATE: 74 BPM | SYSTOLIC BLOOD PRESSURE: 128 MMHG | DIASTOLIC BLOOD PRESSURE: 78 MMHG | OXYGEN SATURATION: 97 %

## 2024-10-02 DIAGNOSIS — J44.9 CHRONIC OBSTRUCTIVE PULMONARY DISEASE, UNSPECIFIED: ICD-10-CM

## 2024-10-02 DIAGNOSIS — J44.1 CHRONIC OBSTRUCTIVE PULMONARY DISEASE WITH (ACUTE) EXACERBATION: ICD-10-CM

## 2024-10-02 DIAGNOSIS — I25.10 ATHEROSCLEROTIC HEART DISEASE OF NATIVE CORONARY ARTERY W/OUT ANGINA PECTORIS: ICD-10-CM

## 2024-10-02 PROCEDURE — G2211 COMPLEX E/M VISIT ADD ON: CPT | Mod: GC

## 2024-10-02 PROCEDURE — 99214 OFFICE O/P EST MOD 30 MIN: CPT | Mod: GC

## 2024-10-02 RX ORDER — BUDESONIDE, GLYCOPYRROLATE, AND FORMOTEROL FUMARATE 160; 9; 4.8 UG/1; UG/1; UG/1
160-9-4.8 AEROSOL, METERED RESPIRATORY (INHALATION)
Qty: 1 | Refills: 3 | Status: ACTIVE | COMMUNITY
Start: 2024-10-02 | End: 1900-01-01

## 2024-10-02 RX ORDER — PREDNISONE 10 MG/1
10 TABLET ORAL
Qty: 42 | Refills: 0 | Status: ACTIVE | COMMUNITY
Start: 2024-10-02 | End: 1900-01-01

## 2024-10-02 NOTE — HEALTH RISK ASSESSMENT
[No] : No [No falls in past year] : Patient reported no falls in the past year [Little interest or pleasure doing things] : 1) Little interest or pleasure doing things [Feeling down, depressed, or hopeless] : 2) Feeling down, depressed, or hopeless [0] : 2) Feeling down, depressed, or hopeless: Not at all (0) [PHQ-2 Negative - No further assessment needed] : PHQ-2 Negative - No further assessment needed [Former] : Former [> 15 Years] : > 15 Years [[id number]] : 0

## 2024-10-02 NOTE — HISTORY OF PRESENT ILLNESS
[FreeTextEntry1] : sob cough [de-identified] : 78/M PMHx COPD (on Wixela plus PRN albuterol HFA), CAD, HTN, HLD, BPH [facility], prescribed prednisone 50 mg x 5 days, & doxycycline 100 mg BID x 1 week. Viral panel not done, home COVID test negative. Now with complaints of worsening productive cough with copious white-colored phlegm (not bloodstained), & dyspnea on exertion with wheezing. Denies chest pain, palpitation, leg swelling

## 2024-10-02 NOTE — REVIEW OF SYSTEMS
[Negative] : Heme/Lymph [Shortness Of Breath] : shortness of breath [Wheezing] : wheezing [Cough] : cough [Dyspnea on Exertion] : dyspnea on exertion

## 2024-10-02 NOTE — ASSESSMENT
[FreeTextEntry1] : COPD exacerbation: Change Wixela to Breztri 2 puffs BID.  Continue with PRN albuterol HFA plus nebs.  I would advise on the course of prednisone with slow taper (60 mg tapered off by 10 mg every 2 days).  Completed 1 course of doxycycline, will hold off on antibiotics for now, unless patient develops pneumonia. Follow-up as scheduled

## 2024-10-10 ENCOUNTER — OFFICE (OUTPATIENT)
Dept: URBAN - METROPOLITAN AREA CLINIC 12 | Facility: CLINIC | Age: 79
Setting detail: OPHTHALMOLOGY
End: 2024-10-10
Payer: MEDICARE

## 2024-10-10 DIAGNOSIS — H02.403: ICD-10-CM

## 2024-10-10 DIAGNOSIS — H35.372: ICD-10-CM

## 2024-10-10 DIAGNOSIS — H43.393: ICD-10-CM

## 2024-10-10 DIAGNOSIS — B02.1: ICD-10-CM

## 2024-10-10 DIAGNOSIS — Z96.1: ICD-10-CM

## 2024-10-10 DIAGNOSIS — E11.9: ICD-10-CM

## 2024-10-10 DIAGNOSIS — H59.811: ICD-10-CM

## 2024-10-10 PROCEDURE — 92134 CPTRZ OPH DX IMG PST SGM RTA: CPT | Performed by: OPHTHALMOLOGY

## 2024-10-10 PROCEDURE — 99024 POSTOP FOLLOW-UP VISIT: CPT | Performed by: OPHTHALMOLOGY

## 2024-10-10 RX ORDER — LANCETS 28 GAUGE
EACH MISCELLANEOUS
Qty: 1 | Refills: 4 | Status: ACTIVE | COMMUNITY
Start: 2024-10-10 | End: 1900-01-01

## 2024-10-10 RX ORDER — BLOOD SUGAR DIAGNOSTIC
STRIP MISCELLANEOUS TWICE DAILY
Qty: 1 | Refills: 3 | Status: ACTIVE | COMMUNITY
Start: 2024-10-10 | End: 1900-01-01

## 2024-10-10 ASSESSMENT — CONFRONTATIONAL VISUAL FIELD TEST (CVF)
OS_FINDINGS: FULL
OD_FINDINGS: FULL

## 2024-10-10 ASSESSMENT — REFRACTION_AUTOREFRACTION
OD_CYLINDER: -0.50
OS_AXIS: 095
OS_CYLINDER: -1.25
OD_SPHERE: -0.50
OS_SPHERE: +0.50
OD_AXIS: 153

## 2024-10-10 ASSESSMENT — REFRACTION_CURRENTRX
OS_SPHERE: +2.00
OD_SPHERE: +2.75
OS_OVR_VA: 20/
OD_CYLINDER: -1.25
OS_VPRISM_DIRECTION: PROGS
OS_AXIS: 090
OS_CYLINDER: -1.00
OD_OVR_VA: 20/
OD_ADD: +2.50
OD_AXIS: 078
OD_VPRISM_DIRECTION: PROGS
OS_ADD: +2.50

## 2024-10-10 ASSESSMENT — LID POSITION - PTOSIS
OD_PTOSIS: RUL 1+ 2+
OS_PTOSIS: LUL 1+ 2+

## 2024-10-10 ASSESSMENT — REFRACTION_MANIFEST
OS_CYLINDER: -1.25
OS_CYLINDER: -1.25
OD_SPHERE: +3.00
OS_SPHERE: +1.75
OD_CYLINDER: -0.50
OS_VA1: 20/NI
OS_AXIS: 115
OS_SPHERE: +1.50
OD_SPHERE: +1.75
OD_AXIS: 080
OS_VA1: 20/40+2
OD_AXIS: 071
OS_AXIS: 110
OD_CYLINDER: -1.50
OD_VA1: 20/25-2
OD_VA1: 20/25-3

## 2024-10-10 ASSESSMENT — TONOMETRY
OS_IOP_MMHG: 13
OD_IOP_MMHG: 13

## 2024-10-10 ASSESSMENT — VISUAL ACUITY
OS_BCVA: 20/25-
OD_BCVA: 20/30+2

## 2024-10-10 ASSESSMENT — CORNEAL EDEMA CLINICAL DESCRIPTION: OD_CORNEALEDEMA: ABSENT

## 2024-10-10 ASSESSMENT — KERATOMETRY
OS_K2POWER_DIOPTERS: 42.75
OS_K1POWER_DIOPTERS: 41.50
OD_AXISANGLE_DEGREES: 009
OD_K1POWER_DIOPTERS: 42.50
OS_AXISANGLE_DEGREES: 138
METHOD_AUTO_MANUAL: AUTO
OD_K2POWER_DIOPTERS: 44.50

## 2024-10-21 ENCOUNTER — APPOINTMENT (OUTPATIENT)
Dept: INTERNAL MEDICINE | Facility: CLINIC | Age: 79
End: 2024-10-21
Payer: MEDICARE

## 2024-10-21 ENCOUNTER — NON-APPOINTMENT (OUTPATIENT)
Age: 79
End: 2024-10-21

## 2024-10-21 VITALS — DIASTOLIC BLOOD PRESSURE: 78 MMHG | RESPIRATION RATE: 12 BRPM | SYSTOLIC BLOOD PRESSURE: 116 MMHG | HEART RATE: 78 BPM

## 2024-10-21 VITALS — WEIGHT: 168 LBS | BODY MASS INDEX: 27.65 KG/M2 | HEIGHT: 65.5 IN

## 2024-10-21 DIAGNOSIS — I25.10 ATHEROSCLEROTIC HEART DISEASE OF NATIVE CORONARY ARTERY W/OUT ANGINA PECTORIS: ICD-10-CM

## 2024-10-21 DIAGNOSIS — I10 ESSENTIAL (PRIMARY) HYPERTENSION: ICD-10-CM

## 2024-10-21 DIAGNOSIS — J44.9 CHRONIC OBSTRUCTIVE PULMONARY DISEASE, UNSPECIFIED: ICD-10-CM

## 2024-10-21 DIAGNOSIS — J44.1 CHRONIC OBSTRUCTIVE PULMONARY DISEASE WITH (ACUTE) EXACERBATION: ICD-10-CM

## 2024-10-21 PROCEDURE — G2211 COMPLEX E/M VISIT ADD ON: CPT

## 2024-10-21 PROCEDURE — 99214 OFFICE O/P EST MOD 30 MIN: CPT

## 2024-10-22 ENCOUNTER — APPOINTMENT (OUTPATIENT)
Dept: UROLOGY | Facility: CLINIC | Age: 79
End: 2024-10-22
Payer: MEDICARE

## 2024-10-22 VITALS
BODY MASS INDEX: 27.53 KG/M2 | DIASTOLIC BLOOD PRESSURE: 76 MMHG | WEIGHT: 168 LBS | HEART RATE: 65 BPM | SYSTOLIC BLOOD PRESSURE: 148 MMHG

## 2024-10-22 DIAGNOSIS — Z12.5 ENCOUNTER FOR SCREENING FOR MALIGNANT NEOPLASM OF PROSTATE: ICD-10-CM

## 2024-10-22 DIAGNOSIS — N40.1 BENIGN PROSTATIC HYPERPLASIA WITH LOWER URINARY TRACT SYMPMS: ICD-10-CM

## 2024-10-22 DIAGNOSIS — N28.1 CYST OF KIDNEY, ACQUIRED: ICD-10-CM

## 2024-10-22 DIAGNOSIS — N13.8 BENIGN PROSTATIC HYPERPLASIA WITH LOWER URINARY TRACT SYMPMS: ICD-10-CM

## 2024-10-22 PROCEDURE — 99214 OFFICE O/P EST MOD 30 MIN: CPT

## 2024-10-27 PROBLEM — N28.1 RENAL CYST: Status: ACTIVE | Noted: 2024-10-27

## 2024-10-27 PROBLEM — Z12.5 SCREENING PSA (PROSTATE SPECIFIC ANTIGEN): Status: ACTIVE | Noted: 2024-10-27

## 2024-10-27 PROBLEM — N28.1 RENAL CYST, ACQUIRED: Noted: 2018-03-19

## 2024-11-07 ENCOUNTER — NON-APPOINTMENT (OUTPATIENT)
Age: 79
End: 2024-11-07

## 2024-11-07 ENCOUNTER — APPOINTMENT (OUTPATIENT)
Dept: INTERNAL MEDICINE | Facility: CLINIC | Age: 79
End: 2024-11-07
Payer: MEDICARE

## 2024-11-07 VITALS
WEIGHT: 169 LBS | HEIGHT: 65.5 IN | BODY MASS INDEX: 27.82 KG/M2 | DIASTOLIC BLOOD PRESSURE: 69 MMHG | SYSTOLIC BLOOD PRESSURE: 123 MMHG | RESPIRATION RATE: 16 BRPM | HEART RATE: 68 BPM

## 2024-11-07 VITALS — BODY MASS INDEX: 27.84 KG/M2 | WEIGHT: 169.13 LBS | HEIGHT: 65.5 IN

## 2024-11-07 DIAGNOSIS — Z00.00 ENCOUNTER FOR GENERAL ADULT MEDICAL EXAMINATION W/OUT ABNORMAL FINDINGS: ICD-10-CM

## 2024-11-07 DIAGNOSIS — J44.9 CHRONIC OBSTRUCTIVE PULMONARY DISEASE, UNSPECIFIED: ICD-10-CM

## 2024-11-07 DIAGNOSIS — I25.10 ATHEROSCLEROTIC HEART DISEASE OF NATIVE CORONARY ARTERY W/OUT ANGINA PECTORIS: ICD-10-CM

## 2024-11-07 DIAGNOSIS — E78.2 MIXED HYPERLIPIDEMIA: ICD-10-CM

## 2024-11-07 DIAGNOSIS — E11.9 TYPE 2 DIABETES MELLITUS W/OUT COMPLICATIONS: ICD-10-CM

## 2024-11-07 PROCEDURE — 93000 ELECTROCARDIOGRAM COMPLETE: CPT | Mod: 59

## 2024-11-07 PROCEDURE — G0439: CPT

## 2024-11-07 PROCEDURE — 36415 COLL VENOUS BLD VENIPUNCTURE: CPT

## 2024-11-07 PROCEDURE — G0444 DEPRESSION SCREEN ANNUAL: CPT

## 2024-11-08 LAB
ALBUMIN SERPL ELPH-MCNC: 3.9 G/DL
ALP BLD-CCNC: 104 U/L
ALT SERPL-CCNC: 16 U/L
ANION GAP SERPL CALC-SCNC: 13 MMOL/L
APPEARANCE: CLEAR
AST SERPL-CCNC: 21 U/L
BACTERIA: NEGATIVE /HPF
BASOPHILS # BLD AUTO: 0.02 K/UL
BASOPHILS NFR BLD AUTO: 0.3 %
BILIRUB SERPL-MCNC: 0.4 MG/DL
BILIRUBIN URINE: NEGATIVE
BLOOD URINE: NEGATIVE
BUN SERPL-MCNC: 17 MG/DL
CALCIUM SERPL-MCNC: 9.4 MG/DL
CAST: 0 /LPF
CHLORIDE SERPL-SCNC: 107 MMOL/L
CHOLEST SERPL-MCNC: 122 MG/DL
CO2 SERPL-SCNC: 22 MMOL/L
COLOR: YELLOW
CREAT SERPL-MCNC: 1 MG/DL
CREAT SPEC-SCNC: 102 MG/DL
EGFR: 78 ML/MIN/1.73M2
EOSINOPHIL # BLD AUTO: 0.09 K/UL
EOSINOPHIL NFR BLD AUTO: 1.5 %
EPITHELIAL CELLS: 0 /HPF
ESTIMATED AVERAGE GLUCOSE: 163 MG/DL
GLUCOSE QUALITATIVE U: NEGATIVE MG/DL
GLUCOSE SERPL-MCNC: 159 MG/DL
HBA1C MFR BLD HPLC: 7.3 %
HCT VFR BLD CALC: 39.8 %
HDLC SERPL-MCNC: 52 MG/DL
HGB BLD-MCNC: 13.3 G/DL
IMM GRANULOCYTES NFR BLD AUTO: 0.5 %
KETONES URINE: NEGATIVE MG/DL
LDLC SERPL CALC-MCNC: 57 MG/DL
LEUKOCYTE ESTERASE URINE: NEGATIVE
LYMPHOCYTES # BLD AUTO: 1.56 K/UL
LYMPHOCYTES NFR BLD AUTO: 25.5 %
MAN DIFF?: NORMAL
MCHC RBC-ENTMCNC: 31.4 PG
MCHC RBC-ENTMCNC: 33.4 G/DL
MCV RBC AUTO: 93.9 FL
MICROALBUMIN 24H UR DL<=1MG/L-MCNC: 2.4 MG/DL
MICROALBUMIN/CREAT 24H UR-RTO: 23 MG/G
MICROSCOPIC-UA: NORMAL
MONOCYTES # BLD AUTO: 0.7 K/UL
MONOCYTES NFR BLD AUTO: 11.4 %
NEUTROPHILS # BLD AUTO: 3.72 K/UL
NEUTROPHILS NFR BLD AUTO: 60.8 %
NITRITE URINE: NEGATIVE
NONHDLC SERPL-MCNC: 69 MG/DL
PH URINE: 5.5
PLATELET # BLD AUTO: 236 K/UL
POTASSIUM SERPL-SCNC: 5.7 MMOL/L
PROT SERPL-MCNC: 6.5 G/DL
PROTEIN URINE: NEGATIVE MG/DL
PSA SERPL-MCNC: 1.64 NG/ML
RBC # BLD: 4.24 M/UL
RBC # FLD: 14.9 %
RED BLOOD CELLS URINE: 0 /HPF
SODIUM SERPL-SCNC: 142 MMOL/L
SPECIFIC GRAVITY URINE: 1.01
T4 FREE SERPL-MCNC: 1.3 NG/DL
TRIGL SERPL-MCNC: 59 MG/DL
TSH SERPL-ACNC: 1.48 UIU/ML
UROBILINOGEN URINE: 0.2 MG/DL
WBC # FLD AUTO: 6.12 K/UL
WHITE BLOOD CELLS URINE: 0 /HPF

## 2025-02-06 NOTE — ASU PATIENT PROFILE, ADULT - TEACHING/LEARNING CULTURAL CONSIDERATIONS
4 Eyes Skin Assessment Completed by MADONNA William and MADONNA Chung.    Head WDL  Ears WDL  Nose WDL  Mouth WDL  Neck WDL  Breast/Chest WDL  Shoulder Blades WDL  Spine WDL  (R) Arm/Elbow/Hand WDL  (L) Arm/Elbow/Hand WDL  Abdomen WDL  Groin WDL  Scrotum/Coccyx/Buttocks WDL  (R) Leg WDL  (L) Leg WDL  (R) Heel/Foot/Toe WDL  (L) Heel/Foot/Toe WDL          Devices In Places PIV, pulse ox      Interventions In Place N/A    Possible Skin Injury No    Pictures Uploaded Into Epic N/A  Wound Consult Placed N/A  RN Wound Prevention Protocol Ordered No      none

## 2025-02-10 RX ORDER — ALBUTEROL SULFATE 2.5 MG/3ML
(2.5 MG/3ML) SOLUTION RESPIRATORY (INHALATION)
Qty: 1 | Refills: 2 | Status: ACTIVE | COMMUNITY
Start: 2025-02-10

## 2025-04-17 ENCOUNTER — APPOINTMENT (OUTPATIENT)
Dept: PULMONOLOGY | Facility: CLINIC | Age: 80
End: 2025-04-17
Payer: MEDICARE

## 2025-04-17 VITALS
SYSTOLIC BLOOD PRESSURE: 140 MMHG | OXYGEN SATURATION: 95 % | BODY MASS INDEX: 26.33 KG/M2 | RESPIRATION RATE: 16 BRPM | HEIGHT: 65 IN | WEIGHT: 158 LBS | DIASTOLIC BLOOD PRESSURE: 70 MMHG | HEART RATE: 63 BPM

## 2025-04-17 VITALS — BODY MASS INDEX: 26.33 KG/M2 | HEIGHT: 65 IN | WEIGHT: 158 LBS

## 2025-04-17 DIAGNOSIS — R06.09 OTHER FORMS OF DYSPNEA: ICD-10-CM

## 2025-04-17 DIAGNOSIS — J44.9 CHRONIC OBSTRUCTIVE PULMONARY DISEASE, UNSPECIFIED: ICD-10-CM

## 2025-04-17 PROCEDURE — 99214 OFFICE O/P EST MOD 30 MIN: CPT | Mod: 25

## 2025-04-17 PROCEDURE — 94010 BREATHING CAPACITY TEST: CPT

## 2025-05-01 ENCOUNTER — APPOINTMENT (OUTPATIENT)
Dept: INTERNAL MEDICINE | Facility: CLINIC | Age: 80
End: 2025-05-01
Payer: MEDICARE

## 2025-05-01 VITALS
HEART RATE: 62 BPM | WEIGHT: 160 LBS | DIASTOLIC BLOOD PRESSURE: 78 MMHG | BODY MASS INDEX: 26.66 KG/M2 | RESPIRATION RATE: 12 BRPM | HEIGHT: 65 IN | SYSTOLIC BLOOD PRESSURE: 132 MMHG

## 2025-05-01 VITALS — WEIGHT: 160 LBS | BODY MASS INDEX: 26.66 KG/M2 | HEIGHT: 65 IN

## 2025-05-01 DIAGNOSIS — I25.10 ATHEROSCLEROTIC HEART DISEASE OF NATIVE CORONARY ARTERY W/OUT ANGINA PECTORIS: ICD-10-CM

## 2025-05-01 DIAGNOSIS — E78.2 MIXED HYPERLIPIDEMIA: ICD-10-CM

## 2025-05-01 DIAGNOSIS — J44.9 CHRONIC OBSTRUCTIVE PULMONARY DISEASE, UNSPECIFIED: ICD-10-CM

## 2025-05-01 DIAGNOSIS — E11.9 TYPE 2 DIABETES MELLITUS W/OUT COMPLICATIONS: ICD-10-CM

## 2025-05-01 DIAGNOSIS — I10 ESSENTIAL (PRIMARY) HYPERTENSION: ICD-10-CM

## 2025-05-01 PROCEDURE — 99214 OFFICE O/P EST MOD 30 MIN: CPT

## 2025-05-01 PROCEDURE — 36415 COLL VENOUS BLD VENIPUNCTURE: CPT

## 2025-05-01 PROCEDURE — G2211 COMPLEX E/M VISIT ADD ON: CPT

## 2025-05-01 RX ORDER — FLUTICASONE FUROATE, UMECLIDINIUM BROMIDE AND VILANTEROL TRIFENATATE 100; 62.5; 25 UG/1; UG/1; UG/1
100-62.5-25 POWDER RESPIRATORY (INHALATION) DAILY
Qty: 1 | Refills: 3 | Status: ACTIVE | COMMUNITY
Start: 2025-05-01

## 2025-05-02 ENCOUNTER — OFFICE (OUTPATIENT)
Dept: URBAN - METROPOLITAN AREA CLINIC 12 | Facility: CLINIC | Age: 80
Setting detail: OPHTHALMOLOGY
End: 2025-05-02
Payer: MEDICARE

## 2025-05-02 DIAGNOSIS — Z96.1: ICD-10-CM

## 2025-05-02 DIAGNOSIS — H59.811: ICD-10-CM

## 2025-05-02 DIAGNOSIS — H35.372: ICD-10-CM

## 2025-05-02 DIAGNOSIS — H02.403: ICD-10-CM

## 2025-05-02 DIAGNOSIS — H43.393: ICD-10-CM

## 2025-05-02 DIAGNOSIS — E11.9: ICD-10-CM

## 2025-05-02 LAB
ALBUMIN SERPL ELPH-MCNC: 4 G/DL
ALP BLD-CCNC: 121 U/L
ALT SERPL-CCNC: 22 U/L
ANION GAP SERPL CALC-SCNC: 13 MMOL/L
AST SERPL-CCNC: 28 U/L
BASOPHILS # BLD AUTO: 0.03 K/UL
BASOPHILS NFR BLD AUTO: 0.5 %
BILIRUB SERPL-MCNC: 0.4 MG/DL
BUN SERPL-MCNC: 19 MG/DL
CALCIUM SERPL-MCNC: 9 MG/DL
CHLORIDE SERPL-SCNC: 109 MMOL/L
CHOLEST SERPL-MCNC: 119 MG/DL
CO2 SERPL-SCNC: 17 MMOL/L
CREAT SERPL-MCNC: 0.88 MG/DL
EGFRCR SERPLBLD CKD-EPI 2021: 88 ML/MIN/1.73M2
EOSINOPHIL # BLD AUTO: 0.1 K/UL
EOSINOPHIL NFR BLD AUTO: 1.7 %
ESTIMATED AVERAGE GLUCOSE: 143 MG/DL
GLUCOSE SERPL-MCNC: 226 MG/DL
HBA1C MFR BLD HPLC: 6.6 %
HCT VFR BLD CALC: 41 %
HDLC SERPL-MCNC: 55 MG/DL
HGB BLD-MCNC: 13.3 G/DL
IMM GRANULOCYTES NFR BLD AUTO: 0.2 %
LDLC SERPL-MCNC: 47 MG/DL
LYMPHOCYTES # BLD AUTO: 1.48 K/UL
LYMPHOCYTES NFR BLD AUTO: 24.7 %
MAN DIFF?: NORMAL
MCHC RBC-ENTMCNC: 30.3 PG
MCHC RBC-ENTMCNC: 32.4 G/DL
MCV RBC AUTO: 93.4 FL
MONOCYTES # BLD AUTO: 0.6 K/UL
MONOCYTES NFR BLD AUTO: 10 %
NEUTROPHILS # BLD AUTO: 3.78 K/UL
NEUTROPHILS NFR BLD AUTO: 62.9 %
NONHDLC SERPL-MCNC: 65 MG/DL
NT-PROBNP SERPL-MCNC: 114 PG/ML
PLATELET # BLD AUTO: 236 K/UL
POTASSIUM SERPL-SCNC: 4.7 MMOL/L
PROT SERPL-MCNC: 6.8 G/DL
RBC # BLD: 4.39 M/UL
RBC # FLD: 15.1 %
SODIUM SERPL-SCNC: 139 MMOL/L
TRIGL SERPL-MCNC: 94 MG/DL
WBC # FLD AUTO: 6 K/UL

## 2025-05-02 PROCEDURE — 92014 COMPRE OPH EXAM EST PT 1/>: CPT | Performed by: OPHTHALMOLOGY

## 2025-05-02 PROCEDURE — 92134 CPTRZ OPH DX IMG PST SGM RTA: CPT | Performed by: OPHTHALMOLOGY

## 2025-05-02 ASSESSMENT — REFRACTION_AUTOREFRACTION
OD_AXIS: 103
OD_SPHERE: +1.00
OD_CYLINDER: -1.50
OS_CYLINDER: -1.50
OS_SPHERE: +0.75
OS_AXIS: 100

## 2025-05-02 ASSESSMENT — REFRACTION_CURRENTRX
OS_OVR_VA: 20/
OD_VPRISM_DIRECTION: SV
OS_VPRISM_DIRECTION: PROGS
OS_AXIS: 090
OS_CYLINDER: -1.00
OD_ADD: +2.50
OD_SPHERE: +1.50
OS_OVR_VA: 20/
OD_AXIS: 078
OD_OVR_VA: 20/
OD_SPHERE: +2.75
OD_OVR_VA: 20/
OS_SPHERE: +1.50
OS_VPRISM_DIRECTION: SV
OS_SPHERE: +2.00
OD_CYLINDER: -1.25
OS_ADD: +2.50
OD_VPRISM_DIRECTION: PROGS

## 2025-05-02 ASSESSMENT — VISUAL ACUITY
OD_BCVA: 20/30+1
OS_BCVA: 20/25

## 2025-05-02 ASSESSMENT — REFRACTION_MANIFEST
OD_SPHERE: +1.75
OS_SPHERE: +1.50
OS_AXIS: 110
OD_VA1: 20/25-3
OS_VA1: 20/40+2
OD_CYLINDER: -0.50
OS_SPHERE: +1.75
OD_AXIS: 071
OS_CYLINDER: -1.25
OS_VA1: 20/NI
OD_VA1: 20/25-2
OS_AXIS: 115
OS_CYLINDER: -1.25
OD_AXIS: 080
OD_SPHERE: +3.00
OD_CYLINDER: -1.50

## 2025-05-02 ASSESSMENT — LID POSITION - PTOSIS
OD_PTOSIS: RUL 1+ 2+
OS_PTOSIS: LUL 1+ 2+

## 2025-05-02 ASSESSMENT — KERATOMETRY
METHOD_AUTO_MANUAL: AUTO
OS_AXISANGLE_DEGREES: 028
OS_K1POWER_DIOPTERS: 43.75
OD_K1POWER_DIOPTERS: 44.00
OD_K2POWER_DIOPTERS: 44.75
OD_AXISANGLE_DEGREES: 016
OS_K2POWER_DIOPTERS: 44.75

## 2025-05-02 ASSESSMENT — CONFRONTATIONAL VISUAL FIELD TEST (CVF)
OS_FINDINGS: FULL
OD_FINDINGS: FULL

## 2025-05-02 ASSESSMENT — TONOMETRY
OD_IOP_MMHG: 15
OS_IOP_MMHG: 17

## 2025-05-02 ASSESSMENT — CORNEAL EDEMA CLINICAL DESCRIPTION: OD_CORNEALEDEMA: ABSENT

## 2025-07-11 NOTE — ASU PREOP CHECKLIST - ADVANCE DIRECTIVE ADDRESSED/READDRESSED
Pt's IV infiltrated mid exam on left ac. It will need checked and possibly a new one put in. I was able to get enough filling to do the exam.    done

## 2025-08-01 NOTE — ASU PATIENT PROFILE, ADULT - TEACHING/LEARNING DEVELOPMENTAL CONSIDERATIONS
Signed.  Patient just started insulin 5 weeks ago.  Confirm with patient if has been check blood sugars.    none

## 2025-08-08 ENCOUNTER — APPOINTMENT (OUTPATIENT)
Dept: INTERNAL MEDICINE | Facility: CLINIC | Age: 80
End: 2025-08-08
Payer: MEDICARE

## 2025-08-08 VITALS
SYSTOLIC BLOOD PRESSURE: 127 MMHG | BODY MASS INDEX: 26.82 KG/M2 | DIASTOLIC BLOOD PRESSURE: 82 MMHG | HEIGHT: 65 IN | WEIGHT: 161 LBS | RESPIRATION RATE: 14 BRPM | HEART RATE: 72 BPM

## 2025-08-08 VITALS — WEIGHT: 161 LBS | BODY MASS INDEX: 26.82 KG/M2 | HEIGHT: 65 IN

## 2025-08-08 VITALS — HEIGHT: 65 IN | WEIGHT: 170 LBS | BODY MASS INDEX: 28.32 KG/M2

## 2025-08-08 DIAGNOSIS — J44.9 CHRONIC OBSTRUCTIVE PULMONARY DISEASE, UNSPECIFIED: ICD-10-CM

## 2025-08-08 DIAGNOSIS — I25.10 ATHEROSCLEROTIC HEART DISEASE OF NATIVE CORONARY ARTERY W/OUT ANGINA PECTORIS: ICD-10-CM

## 2025-08-08 DIAGNOSIS — E11.9 TYPE 2 DIABETES MELLITUS W/OUT COMPLICATIONS: ICD-10-CM

## 2025-08-08 LAB — HBA1C MFR BLD HPLC: 6.5

## 2025-08-08 PROCEDURE — 99214 OFFICE O/P EST MOD 30 MIN: CPT

## 2025-08-08 PROCEDURE — 83036 HEMOGLOBIN GLYCOSYLATED A1C: CPT | Mod: QW

## 2025-08-08 RX ORDER — FLUTICASONE PROPIONATE AND SALMETEROL 250; 50 UG/1; UG/1
250-50 POWDER RESPIRATORY (INHALATION)
Qty: 1 | Refills: 0 | Status: ACTIVE | COMMUNITY
Start: 2025-08-08

## 2025-08-08 RX ORDER — FLUTICASONE PROPIONATE AND SALMETEROL 250; 50 UG/1; UG/1
250-50 POWDER RESPIRATORY (INHALATION)
Refills: 0 | Status: ACTIVE | COMMUNITY
Start: 2025-08-08